# Patient Record
Sex: MALE | Race: WHITE | NOT HISPANIC OR LATINO | URBAN - METROPOLITAN AREA
[De-identification: names, ages, dates, MRNs, and addresses within clinical notes are randomized per-mention and may not be internally consistent; named-entity substitution may affect disease eponyms.]

---

## 2018-10-29 ENCOUNTER — OFFICE (AMBULATORY)
Dept: URBAN - METROPOLITAN AREA PATHOLOGY 4 | Facility: PATHOLOGY | Age: 63
End: 2018-10-29

## 2018-10-29 ENCOUNTER — ON CAMPUS - OUTPATIENT (AMBULATORY)
Dept: URBAN - METROPOLITAN AREA HOSPITAL 2 | Facility: HOSPITAL | Age: 63
End: 2018-10-29
Payer: COMMERCIAL

## 2018-10-29 VITALS
HEIGHT: 74 IN | SYSTOLIC BLOOD PRESSURE: 123 MMHG | DIASTOLIC BLOOD PRESSURE: 74 MMHG | TEMPERATURE: 95.8 F | HEART RATE: 72 BPM | OXYGEN SATURATION: 95 % | WEIGHT: 261 LBS | DIASTOLIC BLOOD PRESSURE: 58 MMHG | SYSTOLIC BLOOD PRESSURE: 114 MMHG | HEART RATE: 59 BPM | HEART RATE: 55 BPM | HEART RATE: 65 BPM | HEART RATE: 80 BPM | RESPIRATION RATE: 18 BRPM | SYSTOLIC BLOOD PRESSURE: 138 MMHG | DIASTOLIC BLOOD PRESSURE: 71 MMHG | OXYGEN SATURATION: 99 % | OXYGEN SATURATION: 96 % | HEART RATE: 77 BPM | HEART RATE: 58 BPM | RESPIRATION RATE: 14 BRPM | SYSTOLIC BLOOD PRESSURE: 104 MMHG | DIASTOLIC BLOOD PRESSURE: 90 MMHG | DIASTOLIC BLOOD PRESSURE: 75 MMHG | OXYGEN SATURATION: 97 % | DIASTOLIC BLOOD PRESSURE: 66 MMHG | HEART RATE: 76 BPM | DIASTOLIC BLOOD PRESSURE: 88 MMHG | SYSTOLIC BLOOD PRESSURE: 126 MMHG | SYSTOLIC BLOOD PRESSURE: 107 MMHG | SYSTOLIC BLOOD PRESSURE: 149 MMHG | OXYGEN SATURATION: 94 % | SYSTOLIC BLOOD PRESSURE: 112 MMHG | DIASTOLIC BLOOD PRESSURE: 65 MMHG | RESPIRATION RATE: 16 BRPM

## 2018-10-29 DIAGNOSIS — Z86.010 PERSONAL HISTORY OF COLONIC POLYPS: ICD-10-CM

## 2018-10-29 DIAGNOSIS — K63.5 POLYP OF COLON: ICD-10-CM

## 2018-10-29 DIAGNOSIS — K57.30 DIVERTICULOSIS OF LARGE INTESTINE WITHOUT PERFORATION OR ABS: ICD-10-CM

## 2018-10-29 DIAGNOSIS — K64.0 FIRST DEGREE HEMORRHOIDS: ICD-10-CM

## 2018-10-29 PROBLEM — D12.3 BENIGN NEOPLASM OF TRANSVERSE COLON: Status: ACTIVE | Noted: 2018-10-29

## 2018-10-29 LAB
GI HISTOLOGY: A. UNSPECIFIED: (no result)
GI HISTOLOGY: PDF REPORT: (no result)

## 2018-10-29 PROCEDURE — 88305 TISSUE EXAM BY PATHOLOGIST: CPT | Mod: 33 | Performed by: INTERNAL MEDICINE

## 2018-10-29 PROCEDURE — 45385 COLONOSCOPY W/LESION REMOVAL: CPT | Mod: 33 | Performed by: INTERNAL MEDICINE

## 2021-10-01 ENCOUNTER — OFFICE (AMBULATORY)
Dept: URBAN - METROPOLITAN AREA CLINIC 64 | Facility: CLINIC | Age: 66
End: 2021-10-01

## 2021-10-01 VITALS
HEART RATE: 88 BPM | DIASTOLIC BLOOD PRESSURE: 54 MMHG | WEIGHT: 280 LBS | HEIGHT: 74 IN | SYSTOLIC BLOOD PRESSURE: 132 MMHG

## 2021-10-01 DIAGNOSIS — R19.7 DIARRHEA, UNSPECIFIED: ICD-10-CM

## 2021-10-01 DIAGNOSIS — R14.0 ABDOMINAL DISTENSION (GASEOUS): ICD-10-CM

## 2021-10-01 DIAGNOSIS — K92.1 MELENA: ICD-10-CM

## 2021-10-01 DIAGNOSIS — R10.84 GENERALIZED ABDOMINAL PAIN: ICD-10-CM

## 2021-10-01 PROCEDURE — 99203 OFFICE O/P NEW LOW 30 MIN: CPT | Performed by: NURSE PRACTITIONER

## 2022-01-11 ENCOUNTER — OFFICE VISIT (OUTPATIENT)
Dept: GASTROENTEROLOGY | Facility: CLINIC | Age: 67
End: 2022-01-11

## 2022-01-11 VITALS — TEMPERATURE: 96.8 F | WEIGHT: 270 LBS

## 2022-01-11 DIAGNOSIS — K75.81 NASH (NONALCOHOLIC STEATOHEPATITIS): ICD-10-CM

## 2022-01-11 DIAGNOSIS — K74.69 OTHER CIRRHOSIS OF LIVER: Primary | ICD-10-CM

## 2022-01-11 PROCEDURE — 99204 OFFICE O/P NEW MOD 45 MIN: CPT | Performed by: INTERNAL MEDICINE

## 2022-01-11 RX ORDER — CYANOCOBALAMIN (VITAMIN B-12) 500 MCG
800 LOZENGE ORAL DAILY
Qty: 60 EACH | Refills: 11 | Status: SHIPPED | OUTPATIENT
Start: 2022-01-11 | End: 2022-12-22

## 2022-01-11 RX ORDER — HYDROCODONE BITARTRATE AND ACETAMINOPHEN 7.5; 325 MG/1; MG/1
TABLET ORAL AS NEEDED
COMMUNITY
Start: 2021-10-20 | End: 2022-02-25

## 2022-01-11 RX ORDER — METFORMIN HYDROCHLORIDE 500 MG/1
500 TABLET, EXTENDED RELEASE ORAL EVERY EVENING
COMMUNITY
Start: 2021-11-05

## 2022-01-11 RX ORDER — TRAMADOL HYDROCHLORIDE 50 MG/1
TABLET ORAL
COMMUNITY
Start: 2022-01-06 | End: 2022-02-25

## 2022-01-11 RX ORDER — HYDROCODONE BITARTRATE AND ACETAMINOPHEN 5; 325 MG/1; MG/1
TABLET ORAL AS NEEDED
COMMUNITY
Start: 2021-10-18 | End: 2022-02-25

## 2022-02-22 ENCOUNTER — TRANSCRIBE ORDERS (OUTPATIENT)
Dept: ADMINISTRATIVE | Facility: HOSPITAL | Age: 67
End: 2022-02-22

## 2022-02-22 DIAGNOSIS — Z01.818 OTHER SPECIFIED PRE-OPERATIVE EXAMINATION: Primary | ICD-10-CM

## 2022-02-25 ENCOUNTER — LAB (OUTPATIENT)
Dept: LAB | Facility: HOSPITAL | Age: 67
End: 2022-02-25

## 2022-02-25 DIAGNOSIS — Z01.818 OTHER SPECIFIED PRE-OPERATIVE EXAMINATION: ICD-10-CM

## 2022-02-25 PROCEDURE — U0004 COV-19 TEST NON-CDC HGH THRU: HCPCS

## 2022-02-25 PROCEDURE — C9803 HOPD COVID-19 SPEC COLLECT: HCPCS

## 2022-02-26 LAB — SARS-COV-2 ORF1AB RESP QL NAA+PROBE: NOT DETECTED

## 2022-02-28 ENCOUNTER — ANESTHESIA EVENT (OUTPATIENT)
Dept: GASTROENTEROLOGY | Facility: HOSPITAL | Age: 67
End: 2022-02-28

## 2022-02-28 ENCOUNTER — HOSPITAL ENCOUNTER (OUTPATIENT)
Facility: HOSPITAL | Age: 67
Setting detail: HOSPITAL OUTPATIENT SURGERY
Discharge: HOME | End: 2022-02-28
Attending: INTERNAL MEDICINE | Admitting: INTERNAL MEDICINE

## 2022-02-28 ENCOUNTER — ANESTHESIA (OUTPATIENT)
Dept: GASTROENTEROLOGY | Facility: HOSPITAL | Age: 67
End: 2022-02-28

## 2022-02-28 VITALS
HEIGHT: 74 IN | DIASTOLIC BLOOD PRESSURE: 61 MMHG | BODY MASS INDEX: 34.39 KG/M2 | WEIGHT: 268 LBS | HEART RATE: 75 BPM | OXYGEN SATURATION: 100 % | RESPIRATION RATE: 16 BRPM | SYSTOLIC BLOOD PRESSURE: 111 MMHG | TEMPERATURE: 98.1 F

## 2022-02-28 DIAGNOSIS — K75.81 NASH (NONALCOHOLIC STEATOHEPATITIS): ICD-10-CM

## 2022-02-28 DIAGNOSIS — K74.69 OTHER CIRRHOSIS OF LIVER: ICD-10-CM

## 2022-02-28 LAB
GLUCOSE BLDC GLUCOMTR-MCNC: 59 MG/DL (ref 70–130)
GLUCOSE BLDC GLUCOMTR-MCNC: 63 MG/DL (ref 70–130)
GLUCOSE BLDC GLUCOMTR-MCNC: 72 MG/DL (ref 70–130)

## 2022-02-28 PROCEDURE — 82962 GLUCOSE BLOOD TEST: CPT

## 2022-02-28 PROCEDURE — S0260 H&P FOR SURGERY: HCPCS | Performed by: INTERNAL MEDICINE

## 2022-02-28 PROCEDURE — 25010000002 PROPOFOL 10 MG/ML EMULSION: Performed by: ANESTHESIOLOGY

## 2022-02-28 PROCEDURE — 43235 EGD DIAGNOSTIC BRUSH WASH: CPT | Performed by: INTERNAL MEDICINE

## 2022-02-28 RX ORDER — SODIUM CHLORIDE 0.9 % (FLUSH) 0.9 %
10 SYRINGE (ML) INJECTION AS NEEDED
Status: DISCONTINUED | OUTPATIENT
Start: 2022-02-28 | End: 2022-02-28 | Stop reason: HOSPADM

## 2022-02-28 RX ORDER — PROPOFOL 10 MG/ML
VIAL (ML) INTRAVENOUS CONTINUOUS PRN
Status: DISCONTINUED | OUTPATIENT
Start: 2022-02-28 | End: 2022-02-28 | Stop reason: SURG

## 2022-02-28 RX ORDER — DEXTROSE MONOHYDRATE 25 G/50ML
25 INJECTION, SOLUTION INTRAVENOUS
Status: DISCONTINUED | OUTPATIENT
Start: 2022-02-28 | End: 2022-02-28 | Stop reason: HOSPADM

## 2022-02-28 RX ORDER — SODIUM CHLORIDE 0.9 % (FLUSH) 0.9 %
3 SYRINGE (ML) INJECTION EVERY 12 HOURS SCHEDULED
Status: DISCONTINUED | OUTPATIENT
Start: 2022-02-28 | End: 2022-02-28 | Stop reason: HOSPADM

## 2022-02-28 RX ORDER — GLYCOPYRROLATE 0.2 MG/ML
INJECTION INTRAMUSCULAR; INTRAVENOUS AS NEEDED
Status: DISCONTINUED | OUTPATIENT
Start: 2022-02-28 | End: 2022-02-28 | Stop reason: SURG

## 2022-02-28 RX ORDER — LIDOCAINE HYDROCHLORIDE 20 MG/ML
INJECTION, SOLUTION INFILTRATION; PERINEURAL AS NEEDED
Status: DISCONTINUED | OUTPATIENT
Start: 2022-02-28 | End: 2022-02-28 | Stop reason: SURG

## 2022-02-28 RX ORDER — SODIUM CHLORIDE, SODIUM LACTATE, POTASSIUM CHLORIDE, CALCIUM CHLORIDE 600; 310; 30; 20 MG/100ML; MG/100ML; MG/100ML; MG/100ML
30 INJECTION, SOLUTION INTRAVENOUS CONTINUOUS PRN
Status: DISCONTINUED | OUTPATIENT
Start: 2022-02-28 | End: 2022-02-28 | Stop reason: HOSPADM

## 2022-02-28 RX ORDER — PROPOFOL 10 MG/ML
VIAL (ML) INTRAVENOUS AS NEEDED
Status: DISCONTINUED | OUTPATIENT
Start: 2022-02-28 | End: 2022-02-28 | Stop reason: SURG

## 2022-02-28 RX ADMIN — LIDOCAINE HYDROCHLORIDE 60 MG: 20 INJECTION, SOLUTION INFILTRATION; PERINEURAL at 15:15

## 2022-02-28 RX ADMIN — Medication 200 MCG/KG/MIN: at 15:15

## 2022-02-28 RX ADMIN — PROPOFOL 100 MG: 10 INJECTION, EMULSION INTRAVENOUS at 15:15

## 2022-02-28 RX ADMIN — DEXTROSE MONOHYDRATE 25 ML: 25 INJECTION, SOLUTION INTRAVENOUS at 14:53

## 2022-02-28 RX ADMIN — GLYCOPYRROLATE 0.2 MG: 0.2 INJECTION INTRAMUSCULAR; INTRAVENOUS at 15:12

## 2022-02-28 RX ADMIN — SODIUM CHLORIDE, POTASSIUM CHLORIDE, SODIUM LACTATE AND CALCIUM CHLORIDE 30 ML/HR: 600; 310; 30; 20 INJECTION, SOLUTION INTRAVENOUS at 14:41

## 2022-02-28 NOTE — ANESTHESIA POSTPROCEDURE EVALUATION
"Patient: Lei Mercado    Procedure Summary     Date: 02/28/22 Room / Location: [facility] ENDOSCOPY 6 /  [facility]    Anesthesia Start: 1506 Anesthesia Stop: 1528    Procedure: ESOPHAGOGASTRODUODENOSCOPY (N/A Esophagus) Diagnosis:       Other cirrhosis of liver (HCC)      PERALES (nonalcoholic steatohepatitis)      (Other cirrhosis of liver (HCC) [K74.69])      (PERALES (nonalcoholic steatohepatitis) [K75.81])    Surgeons: David Almeida MD Provider: Saji Nguyễn MD    Anesthesia Type: MAC ASA Status: 3          Anesthesia Type: MAC    Vitals  Vitals Value Taken Time   /61 02/28/22 1545   Temp     Pulse 75 02/28/22 1545   Resp 16 02/28/22 1545   SpO2 100 % 02/28/22 1545           Post Anesthesia Care and Evaluation    Patient location during evaluation: bedside  Patient participation: complete - patient participated  Level of consciousness: awake and alert  Pain management: adequate  Airway patency: patent  Anesthetic complications: No anesthetic complications  PONV Status: none  Cardiovascular status: acceptable  Respiratory status: acceptable  Hydration status: acceptable    Comments: /61 (BP Location: Left arm, Patient Position: Sitting)   Pulse 75   Temp 36.7 °C (98.1 °F) (Oral)   Resp 16   Ht 188 cm (74\")   Wt 122 kg (268 lb)   SpO2 100%   BMI 34.41 kg/m²         "

## 2022-02-28 NOTE — PROGRESS NOTES
Chief Complaint   Patient presents with   • Cirrhosis     Lei Mercado is a 66 y.o. male who presents with a history of fatty liver and cirrhosis  HPI     Patient 66-year-old male with history hypertension, diabetes found with changes of cirrhosis and renal cell carcinoma.  Patient currently suffering with intermittent right upper quadrant pain last EGD in 2018 was unremarkable per patient but results unavailable at this time.  Patient referred for evaluation for cirrhosis.  Patient denies bleeding diathesis but does note some abdominal distention but baseline obesity is present.  Patient denies any fever chills, no notable jaundice or change in urine or stool color.    Past Medical History:   Diagnosis Date   • Anesthesia complication     AWAKENED EARLY, ASPIRATION PNEUMONIA   • Cancer (HCC)     KIDNEY   • Cirrhosis (HCC)    • Diabetes mellitus (HCC)    • Diverticulitis of colon    • Fatty liver    • Gallstones    • Hypertension    • Liver disease      No current facility-administered medications for this visit.  No current outpatient medications on file.    Facility-Administered Medications Ordered in Other Visits:   •  dextrose (D50W) (25 g/50 mL) IV injection 25 mL, 25 mL, Intravenous, Q1H PRN, Abhi Decker MD, 25 mL at 02/28/22 1453  •  glycopyrrolate (ROBINUL) injection, , Intravenous, PRN, Saji Nguyễn MD, 0.2 mg at 02/28/22 1512  •  lactated ringers infusion, 30 mL/hr, Intravenous, Continuous PRN, David Almeida MD, Last Rate: 30 mL/hr at 02/28/22 1506, Currently Infusing at 02/28/22 1506  •  sodium chloride 0.9 % flush 10 mL, 10 mL, Intravenous, PRN, David Almeida MD  •  sodium chloride 0.9 % flush 3 mL, 3 mL, Intravenous, Q12H, David Almeida MD  No Known Allergies  Social History     Socioeconomic History   • Marital status: Single   Tobacco Use   • Smoking status: Former Smoker   • Smokeless tobacco: Never Used   • Tobacco comment: quit 20 plus years ago    Substance and  Sexual Activity   • Alcohol use: Never   • Drug use: Never   • Sexual activity: Defer     Family History   Problem Relation Age of Onset   • Cirrhosis Mother    • Liver disease Sister    • Malig Hyperthermia Neg Hx      Review of Systems   Constitutional: Positive for fatigue. Negative for activity change, appetite change, chills, diaphoresis, fever and unexpected weight change.   HENT: Negative.    Eyes: Negative.    Respiratory: Negative.    Cardiovascular: Negative.    Gastrointestinal: Positive for abdominal distention and abdominal pain. Negative for anal bleeding, blood in stool, constipation, diarrhea, nausea, rectal pain and vomiting.   Endocrine: Negative.    Musculoskeletal: Negative.    Skin: Negative.    Allergic/Immunologic: Negative.    Hematological: Negative.      Vitals:    01/11/22 1619   Temp: 96.8 °F (36 °C)     Physical Exam  Vitals and nursing note reviewed.   Constitutional:       Appearance: Normal appearance. He is well-developed. He is obese.   HENT:      Head: Normocephalic and atraumatic.   Eyes:      General: No scleral icterus.     Conjunctiva/sclera: Conjunctivae normal.   Neck:      Trachea: No tracheal deviation.   Cardiovascular:      Rate and Rhythm: Normal rate and regular rhythm.   Pulmonary:      Effort: Pulmonary effort is normal. No respiratory distress.      Breath sounds: Normal breath sounds.   Abdominal:      General: Bowel sounds are normal. There is distension.      Palpations: Abdomen is soft. There is no mass.      Tenderness: There is no abdominal tenderness. There is no guarding or rebound.   Musculoskeletal:         General: No swelling or tenderness. Normal range of motion.      Cervical back: Normal range of motion and neck supple. No rigidity.   Skin:     General: Skin is warm and dry.      Coloration: Skin is not jaundiced.      Findings: No rash.   Neurological:      General: No focal deficit present.      Mental Status: He is alert and oriented to person,  place, and time.      Cranial Nerves: No cranial nerve deficit.   Psychiatric:         Behavior: Behavior normal.         Thought Content: Thought content normal.         Judgment: Judgment normal.       Diagnoses and all orders for this visit:    Other cirrhosis of liver (HCC)  -     Case Request; Standing  -     Case Request    PERALES (nonalcoholic steatohepatitis)  -     Case Request; Standing  -     Case Request    Other orders  -     Discontinue: traMADol (ULTRAM) 50 MG tablet; TAKE 1 TABLET BY MOUTH EVERY 8 HOURS AS NEEDED FOR PAIN . DO NOT EXCEED 3 PER 24 HOURS  -     metFORMIN ER (GLUCOPHAGE-XR) 500 MG 24 hr tablet; Take 2,000 mg by mouth Daily.  -     Discontinue: HYDROcodone-acetaminophen (NORCO) 7.5-325 MG per tablet; As Needed.  -     Discontinue: HYDROcodone-acetaminophen (NORCO) 5-325 MG per tablet; As Needed.  -     Vitamin E 400 units tablet; Take 800 Units by mouth Daily.    Patient 66-year-old male with history of diabetes, hypertension, fatty liver with signs of cirrhosis as well as a CT consistent with kidney mass.  Patient complaining of right upper quadrant pain here for evaluation of his liver disease.  At this point to diffuse likely Perales would recommend patient begin vitamin E 800 units daily.  Recommend follow-up with primary care to assess his kidney tumor for possible renal cell as this would also cause intermittent right upper quadrant pain.  Patient for EGD to evaluate cause of the right upper quadrant from GI perspective and evaluate for varices.  We will follow-up clinically based on endoscopy findings

## 2022-02-28 NOTE — ANESTHESIA PREPROCEDURE EVALUATION
Anesthesia Evaluation     Patient summary reviewed and Nursing notes reviewed   no history of anesthetic complications:  NPO Solid Status: > 8 hours  NPO Liquid Status: > 4 hours           Airway   Mallampati: II  Dental      Pulmonary - normal exam   (+) a smoker Former,   Cardiovascular - normal exam    (+) hypertension less than 2 medications,       Neuro/Psych- negative ROS  GI/Hepatic/Renal/Endo    (+)   liver disease fatty liver disease cirrhosis, diabetes mellitus type 2,     Musculoskeletal     Abdominal    Substance History      OB/GYN          Other      history of cancer                    Anesthesia Plan    ASA 3     MAC     intravenous induction     Anesthetic plan, all risks, benefits, and alternatives have been provided, discussed and informed consent has been obtained with: patient.        CODE STATUS:

## 2022-05-09 ENCOUNTER — OFFICE VISIT (OUTPATIENT)
Dept: GASTROENTEROLOGY | Facility: CLINIC | Age: 67
End: 2022-05-09

## 2022-05-09 VITALS
HEART RATE: 87 BPM | TEMPERATURE: 96.1 F | WEIGHT: 284.6 LBS | BODY MASS INDEX: 36.52 KG/M2 | SYSTOLIC BLOOD PRESSURE: 137 MMHG | HEIGHT: 74 IN | DIASTOLIC BLOOD PRESSURE: 75 MMHG

## 2022-05-09 DIAGNOSIS — R18.8 CIRRHOSIS OF LIVER WITH ASCITES, UNSPECIFIED HEPATIC CIRRHOSIS TYPE: Primary | ICD-10-CM

## 2022-05-09 DIAGNOSIS — K74.60 CIRRHOSIS OF LIVER WITH ASCITES, UNSPECIFIED HEPATIC CIRRHOSIS TYPE: Primary | ICD-10-CM

## 2022-05-09 DIAGNOSIS — K75.81 NASH (NONALCOHOLIC STEATOHEPATITIS): ICD-10-CM

## 2022-05-09 PROCEDURE — 99214 OFFICE O/P EST MOD 30 MIN: CPT | Performed by: NURSE PRACTITIONER

## 2022-05-09 RX ORDER — TERAZOSIN 10 MG/1
10 CAPSULE ORAL NIGHTLY
COMMUNITY
Start: 2022-03-07

## 2022-05-09 RX ORDER — ALBUMIN (HUMAN) 12.5 G/50ML
12.5 SOLUTION INTRAVENOUS ONCE
Status: CANCELLED | OUTPATIENT
Start: 2022-05-09 | End: 2022-05-09

## 2022-05-09 RX ORDER — PIOGLITAZONEHYDROCHLORIDE 15 MG/1
15 TABLET ORAL EVERY EVENING
COMMUNITY
Start: 2022-03-08

## 2022-05-09 RX ORDER — SERTRALINE HYDROCHLORIDE 100 MG/1
200 TABLET, FILM COATED ORAL EVERY EVENING
COMMUNITY
Start: 2022-03-08

## 2022-05-09 RX ORDER — MONTELUKAST SODIUM 10 MG/1
10 TABLET ORAL
COMMUNITY
Start: 2022-05-03

## 2022-05-09 RX ORDER — LANOLIN ALCOHOL/MO/W.PET/CERES
325 CREAM (GRAM) TOPICAL 3 TIMES DAILY
COMMUNITY
Start: 2021-06-15 | End: 2022-06-15

## 2022-05-09 RX ORDER — AMLODIPINE BESYLATE AND BENAZEPRIL HYDROCHLORIDE 10; 40 MG/1; MG/1
CAPSULE ORAL
COMMUNITY
Start: 2022-03-07 | End: 2022-12-22

## 2022-05-09 RX ORDER — ESOMEPRAZOLE MAGNESIUM 40 MG/1
40 CAPSULE, DELAYED RELEASE ORAL EVERY EVENING
COMMUNITY
Start: 2022-03-07 | End: 2022-12-29 | Stop reason: HOSPADM

## 2022-05-09 RX ORDER — ROSUVASTATIN CALCIUM 10 MG/1
10 TABLET, COATED ORAL NIGHTLY
COMMUNITY
Start: 2022-03-08

## 2022-05-09 RX ORDER — ALBUTEROL SULFATE 90 UG/1
AEROSOL, METERED RESPIRATORY (INHALATION)
COMMUNITY
Start: 2022-04-11 | End: 2022-12-22

## 2022-05-09 RX ORDER — ALBUTEROL SULFATE 2.5 MG/3ML
SOLUTION RESPIRATORY (INHALATION)
COMMUNITY
Start: 2022-05-06 | End: 2022-12-22

## 2022-05-09 NOTE — PROGRESS NOTES
Chief Complaint   Patient presents with   • Ascites       HPI    Lei Mercado is a  66 y.o. male here with a history of fatty liver disease and cirrhosis with new onset ascites.    Patient follows with Dr. Almeida, new to me.    History of hypertension, diabetes and renal cell carcinoma.    Patient seen by Dr. Almeida for initial consultation in January at which time he complained of intermittent right upper quadrant abdominal pain.  He was also referred for evaluation for cirrhosis confirmed on CT.  He was started on vitamin E 800 units daily and instructed to go back to his primary care provider to assess his kidneys tumor for possible renal cell carcinoma.    Subsequently underwent EGD to evaluate because of right upper quadrant pain and evaluate for esophageal varices.  Reviewed EGD dated 2/28/2022 --small hiatal hernia otherwise normal.  No specimens collected.    On visit today patient reports approximately 20 pound weight gain since his last office visit with Dr. Almeida.  Weight accumulating in the abdominal region with distention.  Patient denies history of ascites.  He is never had a paracentesis.  No bilateral lower extremity edema.  Reports abdominal discomfort secondary to distention but denies right upper quadrant abdominal pain.  No jaundice, icterus, poor appetite, nausea, or vomiting.  He continues on vitamin E as previously directed.  He is not on diuretics.    He is on metformin and iron supplementation.  He has had diarrhea after starting metformin.  No rectal bleeding.  His last colonoscopy was 2018.  Patient reports personal history of colon polyps.    Hemoglobin 2 months ago 9.7.    Past Medical History:   Diagnosis Date   • Anemia 09/2011   • Anesthesia complication     AWAKENED EARLY, ASPIRATION PNEUMONIA   • Cancer (HCC)     KIDNEY   • Cirrhosis (HCC)    • Diabetes mellitus (HCC)    • Diverticulitis of colon    • Esophageal varices (HCC)    • Fatty liver    • Gallstones    • Hypertension    •  "Liver disease        Past Surgical History:   Procedure Laterality Date   • COLONOSCOPY  approx 2018    benign polyps per pt    • ENDOSCOPY N/A 2022    Procedure: ESOPHAGOGASTRODUODENOSCOPY;  Surgeon: David Almeida MD;  Location: [facility] ENDOSCOPY;  Service: Gastroenterology;  Laterality: N/A;  pre - ruq pain, hx parikh, cirrhosis  post - hiatal hernia   • NEPHRECTOMY Right     partial    • SHOULDER SURGERY      right shoulder \"reverse\" per pt    • UPPER GASTROINTESTINAL ENDOSCOPY  approx 2018    negative per pt        Scheduled Meds:     Continuous Infusions: No current facility-administered medications for this visit.      PRN Meds:     No Known Allergies    Social History     Socioeconomic History   • Marital status: Single   Tobacco Use   • Smoking status: Former Smoker     Packs/day: 1.00     Years: 15.00     Pack years: 15.00     Types: Cigarettes     Start date: 1971     Quit date: 1984     Years since quittin.0   • Smokeless tobacco: Never Used   • Tobacco comment: quit 20 plus years ago    Substance and Sexual Activity   • Alcohol use: Never   • Drug use: Not Currently     Types: Marijuana   • Sexual activity: Defer       Family History   Problem Relation Age of Onset   • Cirrhosis Mother    • Liver disease Sister    • Malig Hyperthermia Neg Hx        Review of Systems   Constitutional: Negative for activity change, appetite change, fatigue, fever and unexpected weight change.   HENT: Negative for trouble swallowing.    Respiratory: Negative for apnea, cough, choking, chest tightness, shortness of breath and wheezing.    Cardiovascular: Negative for chest pain, palpitations and leg swelling.   Gastrointestinal: Positive for abdominal distention. Negative for abdominal pain, anal bleeding, blood in stool, constipation, diarrhea, nausea, rectal pain and vomiting.       Vitals:    22 0955   BP: 137/75   Pulse: 87   Temp: 96.1 °F (35.6 °C)       Physical Exam  Constitutional:       " Appearance: He is well-developed.   Abdominal:      General: Bowel sounds are normal. There is no distension.      Palpations: Abdomen is rigid. There is no mass.      Tenderness: There is no abdominal tenderness. There is no guarding.      Hernia: No hernia is present.      Comments: + Ascites   Skin:     General: Skin is warm and dry.      Capillary Refill: Capillary refill takes less than 2 seconds.   Neurological:      Mental Status: He is alert and oriented to person, place, and time.   Psychiatric:         Behavior: Behavior normal.     Assessment    Diagnoses and all orders for this visit:    1. Cirrhosis of liver with ascites, unspecified hepatic cirrhosis type (HCC) (Primary)  -     CBC & Differential  -     Comprehensive Metabolic Panel  -     Protime-INR  -     AFP Tumor Marker  -     US Paracentesis; Future  -     Duplex Portal Hepatic Complete CAR; Future    2. PERALES (nonalcoholic steatohepatitis)  Overview:  Added automatically from request for surgery [id number]    Orders:  -     CBC & Differential  -     Comprehensive Metabolic Panel  -     Protime-INR  -     AFP Tumor Marker       Plan    Arrange ultrasound-guided paracentesis with fluid analysis as above  Obtain hepatic Doppler flow studies  Labs today as above  Continue vitamin E   Begin low-salt diet -- 2 g/day  Educational handout given to the patient with low-salt diet instructions  After paracentesis patient is to weigh himself every morning and keep a log --we discussed when to call our office based on weight log/weight gain  Consider diuretic initiation pending lab work and paracentesis results  Keep previously scheduled follow-up with Dr. Almeida next month         DONALD Bassett  [facility]  [address] [place] [address]  Office: [phone]

## 2022-05-10 ENCOUNTER — TELEPHONE (OUTPATIENT)
Dept: GASTROENTEROLOGY | Facility: CLINIC | Age: 67
End: 2022-05-10

## 2022-05-10 LAB
AFP-TM SERPL-MCNC: 1.6 NG/ML (ref 0–8.4)
ALBUMIN SERPL-MCNC: 3.8 G/DL (ref 3.8–4.8)
ALBUMIN/GLOB SERPL: 1.2 {RATIO} (ref 1.2–2.2)
ALP SERPL-CCNC: 131 IU/L (ref 44–121)
ALT SERPL-CCNC: 9 IU/L (ref 0–44)
AST SERPL-CCNC: 15 IU/L (ref 0–40)
BASOPHILS # BLD AUTO: 0 X10E3/UL (ref 0–0.2)
BASOPHILS NFR BLD AUTO: 0 %
BILIRUB SERPL-MCNC: 0.4 MG/DL (ref 0–1.2)
BUN SERPL-MCNC: 24 MG/DL (ref 8–27)
BUN/CREAT SERPL: 18 (ref 10–24)
CALCIUM SERPL-MCNC: 9.3 MG/DL (ref 8.6–10.2)
CHLORIDE SERPL-SCNC: 103 MMOL/L (ref 96–106)
CO2 SERPL-SCNC: 23 MMOL/L (ref 20–29)
CREAT SERPL-MCNC: 1.33 MG/DL (ref 0.76–1.27)
EGFRCR SERPLBLD CKD-EPI 2021: 59 ML/MIN/1.73
EOSINOPHIL # BLD AUTO: 0 X10E3/UL (ref 0–0.4)
EOSINOPHIL NFR BLD AUTO: 1 %
ERYTHROCYTE [DISTWIDTH] IN BLOOD BY AUTOMATED COUNT: 15.8 % (ref 11.6–15.4)
GLOBULIN SER CALC-MCNC: 3.3 G/DL (ref 1.5–4.5)
GLUCOSE SERPL-MCNC: 206 MG/DL (ref 65–99)
HCT VFR BLD AUTO: 32 % (ref 37.5–51)
HGB BLD-MCNC: 9.6 G/DL (ref 13–17.7)
IMM GRANULOCYTES # BLD AUTO: 0 X10E3/UL (ref 0–0.1)
IMM GRANULOCYTES NFR BLD AUTO: 0 %
INR PPP: 1.1 (ref 0.9–1.2)
LYMPHOCYTES # BLD AUTO: 0.5 X10E3/UL (ref 0.7–3.1)
LYMPHOCYTES NFR BLD AUTO: 21 %
MCH RBC QN AUTO: 24.1 PG (ref 26.6–33)
MCHC RBC AUTO-ENTMCNC: 30 G/DL (ref 31.5–35.7)
MCV RBC AUTO: 80 FL (ref 79–97)
MONOCYTES # BLD AUTO: 0.2 X10E3/UL (ref 0.1–0.9)
MONOCYTES NFR BLD AUTO: 8 %
NEUTROPHILS # BLD AUTO: 1.8 X10E3/UL (ref 1.4–7)
NEUTROPHILS NFR BLD AUTO: 70 %
PLATELET # BLD AUTO: 105 X10E3/UL (ref 150–450)
POTASSIUM SERPL-SCNC: 4.5 MMOL/L (ref 3.5–5.2)
PROT SERPL-MCNC: 7.1 G/DL (ref 6–8.5)
PROTHROMBIN TIME: 11.6 SEC (ref 9.1–12)
RBC # BLD AUTO: 3.99 X10E6/UL (ref 4.14–5.8)
SODIUM SERPL-SCNC: 140 MMOL/L (ref 134–144)
WBC # BLD AUTO: 2.5 X10E3/UL (ref 3.4–10.8)

## 2022-05-10 RX ORDER — ALBUMIN (HUMAN) 12.5 G/50ML
25 SOLUTION INTRAVENOUS DAILY PRN
Status: CANCELLED | OUTPATIENT
Start: 2022-05-11

## 2022-05-10 RX ORDER — ALBUMIN (HUMAN) 12.5 G/50ML
12.5 SOLUTION INTRAVENOUS DAILY PRN
Status: CANCELLED | OUTPATIENT
Start: 2022-05-11

## 2022-05-10 NOTE — TELEPHONE ENCOUNTER
----- Message from DONALD Bassett sent at 5/10/2022 12:37 PM EDT -----  Please inform Lei that his lab work shows stable anemia and improvement in liver function test.  Calculated meld score 10.  Await paracentesis.

## 2022-05-10 NOTE — PROGRESS NOTES
Please inform Lei that his lab work shows stable anemia and improvement in liver function test.  Calculated meld score 10.  Await paracentesis.

## 2022-05-11 ENCOUNTER — HOSPITAL ENCOUNTER (OUTPATIENT)
Dept: INFUSION THERAPY | Facility: HOSPITAL | Age: 67
Discharge: HOME | End: 2022-05-11
Admitting: NURSE PRACTITIONER

## 2022-05-11 VITALS
RESPIRATION RATE: 15 BRPM | SYSTOLIC BLOOD PRESSURE: 153 MMHG | DIASTOLIC BLOOD PRESSURE: 57 MMHG | OXYGEN SATURATION: 97 % | HEART RATE: 78 BPM

## 2022-05-11 DIAGNOSIS — R18.8 CIRRHOSIS OF LIVER WITH ASCITES, UNSPECIFIED HEPATIC CIRRHOSIS TYPE: ICD-10-CM

## 2022-05-11 DIAGNOSIS — K74.60 CIRRHOSIS OF LIVER WITH ASCITES, UNSPECIFIED HEPATIC CIRRHOSIS TYPE: ICD-10-CM

## 2022-05-11 LAB
ALBUMIN FLD-MCNC: 2.3 G/DL
AMYLASE FLD-CCNC: 19 U/L
APPEARANCE FLD: CLEAR
COLOR FLD: YELLOW
GLUCOSE FLD-MCNC: 186 MG/DL
LDH FLD-CCNC: 75 U/L
LYMPHOCYTES NFR FLD MANUAL: 70 %
METHOD: NORMAL
MONOCYTES NFR FLD: 26 %
NEUTROPHILS NFR FLD MANUAL: 4 %
NUC CELL # FLD: 359 /MM3
PROT FLD-MCNC: 3.5 G/DL
RBC # FLD AUTO: 941 /MM3

## 2022-05-11 PROCEDURE — 76942 ECHO GUIDE FOR BIOPSY: CPT

## 2022-05-11 PROCEDURE — 87075 CULTR BACTERIA EXCEPT BLOOD: CPT | Performed by: NURSE PRACTITIONER

## 2022-05-11 PROCEDURE — 88108 CYTOPATH CONCENTRATE TECH: CPT | Performed by: NURSE PRACTITIONER

## 2022-05-11 PROCEDURE — 49083 ABD PARACENTESIS W/IMAGING: CPT | Performed by: FAMILY MEDICINE

## 2022-05-11 PROCEDURE — 82945 GLUCOSE OTHER FLUID: CPT | Performed by: NURSE PRACTITIONER

## 2022-05-11 PROCEDURE — 87070 CULTURE OTHR SPECIMN AEROBIC: CPT | Performed by: NURSE PRACTITIONER

## 2022-05-11 PROCEDURE — 25010000002 ALBUMIN HUMAN 25% PER 50 ML: Performed by: NURSE PRACTITIONER

## 2022-05-11 PROCEDURE — 89051 BODY FLUID CELL COUNT: CPT | Performed by: NURSE PRACTITIONER

## 2022-05-11 PROCEDURE — 84157 ASSAY OF PROTEIN OTHER: CPT | Performed by: NURSE PRACTITIONER

## 2022-05-11 PROCEDURE — 82042 OTHER SOURCE ALBUMIN QUAN EA: CPT | Performed by: NURSE PRACTITIONER

## 2022-05-11 PROCEDURE — 83615 LACTATE (LD) (LDH) ENZYME: CPT | Performed by: NURSE PRACTITIONER

## 2022-05-11 PROCEDURE — 82247 BILIRUBIN TOTAL: CPT | Performed by: NURSE PRACTITIONER

## 2022-05-11 PROCEDURE — 96365 THER/PROPH/DIAG IV INF INIT: CPT

## 2022-05-11 PROCEDURE — P9047 ALBUMIN (HUMAN), 25%, 50ML: HCPCS | Performed by: NURSE PRACTITIONER

## 2022-05-11 PROCEDURE — 82150 ASSAY OF AMYLASE: CPT | Performed by: NURSE PRACTITIONER

## 2022-05-11 PROCEDURE — 87205 SMEAR GRAM STAIN: CPT | Performed by: NURSE PRACTITIONER

## 2022-05-11 PROCEDURE — 96366 THER/PROPH/DIAG IV INF ADDON: CPT

## 2022-05-11 RX ORDER — ALBUMIN (HUMAN) 12.5 G/50ML
12.5 SOLUTION INTRAVENOUS DAILY PRN
Status: DISCONTINUED | OUTPATIENT
Start: 2022-05-11 | End: 2022-05-13 | Stop reason: HOSPADM

## 2022-05-11 RX ORDER — ALBUMIN (HUMAN) 12.5 G/50ML
25 SOLUTION INTRAVENOUS DAILY PRN
Status: DISCONTINUED | OUTPATIENT
Start: 2022-05-11 | End: 2022-05-13 | Stop reason: HOSPADM

## 2022-05-11 RX ORDER — ALBUMIN (HUMAN) 12.5 G/50ML
12.5 SOLUTION INTRAVENOUS ONCE
Status: DISCONTINUED | OUTPATIENT
Start: 2022-05-11 | End: 2022-05-13 | Stop reason: HOSPADM

## 2022-05-11 RX ADMIN — ALBUMIN (HUMAN) 25 G: 0.25 INJECTION, SOLUTION INTRAVENOUS at 13:09

## 2022-05-11 RX ADMIN — ALBUMIN (HUMAN) 25 G: 0.25 INJECTION, SOLUTION INTRAVENOUS at 13:59

## 2022-05-11 RX ADMIN — ALBUMIN (HUMAN) 25 G: 0.25 INJECTION, SOLUTION INTRAVENOUS at 14:30

## 2022-05-11 NOTE — PROCEDURES
Procedure:Ultrasound guided Paracentesis    Consent: Informed    Pre op diagnosis: Cirrhosis, Massive ascites    Post op diagnosis: Cirrhosis, Massive ascites    Anesthesia Provider: .Bimal Hartman MD    Anesthesia type: Local infiltration with 1% xylocaine     Surgeon: .Bimal Hartman MD    Assistant: none    Significant Clinical Findings:    Procedure summary:    Allergies , labs and medications were reviewed. Patient was made to lay supine and the area of interest was imaged with ultrasound and fluid verified and marked.  Area of interest was cleaned and draped in a sterile fashion. subsequently local infiltration with xylocaine. Trocar and canula were advanced. Upon verification of the fluid, trocar was steadied and canula advanced further. Trocar was drawn out. Canula was connected to extension tubing and to the vacuum bottle. Subsequently the canula was removed after completion of the procedure.     Findings: Clear straw yellow fluid was obtained. See nursing documentation for volume drained.    Lab Specimen: 40 ml    Complication: none    EBL: 0 ml    Post op condition: Stable. Albumin was given by protocol if needed.    Post op plan: Discharge back to the referring physician.

## 2022-05-11 NOTE — PROGRESS NOTES
PARACENTESIS    Time Arrived in Department: 1105      Consent: yes  Allergies have been Reviewed: yes      ID Band Verified: yes    Method: Ambulatory    Lab Results: Checked    Physician: Devan      Nurse: Samreen Holm RN    IR Care Plan: Person Educated - Patient, Current Knowledge - Understands, Learning Barrier - None, Readiness to Learn - Acceptance, Teaching Topic - Procedure and Evaluation of Teaching - Verbalized Understanding      Neurological: Within Normal Limits    Skin: Flesh, Warm and Dry    Respiratory Status: Respirations even and unlabored    Room In: 9      Procedure: Paracentesis    Time Out Completed: yes    Time Out: 1225    Prep Time: 1228    Prep Site 1: Right Lateral Abdomen      Prep: Chlorhexidine and Sterile drape    Procedure Position: Right Side    Guidance: Ultrasound    Fluid Quality: Clear and Yellow    Procedure Note: Pt prepped and draped in a sterile fashion. 1% lidocaine with spinal needle used for local anesthesia.  Pt tolerated procedure well.         Intra Time 1:1250    Intra Assess 1:   LOC: Alert  Pain:  No Issues  Skin: Flesh, Warm and Dry  Respiratory Status:  Even and Unlabored  Intra Procedure Note 1: Pt draining well        Intra Time 2: 1310    Intra Assess 2:   LOC: Alert  Pain: No Issues  Skin: Flesh, Warm and Dry  Respiratory Status: Even and Unlabored  Intra Procedure Note 2: continues to drain        Intra Time 3: 1405    Intra Assess 3:   LOC: Alert  Pain: No Issues  Skin: Flesh, Warm and Dry  Respiratory Status: Even and Unlabored  Intra Procedure Note 3:para completed                Post-Procedure Position: Supine and HOB Elevated    Dressing Site 1: 2x2 and Tegaderm    Post Procedure Status:  Alert and Oriented, returned to baseline, Return to baseline, Dressing Dry/ Intact, IV documented (see flowsheet), Stable Condition and Dressing properly labeled    Specimen To Lab: yes    Total Fluid Removed: 12.2 liters    Post Procedure Note:  Pt tolerated procedure  well.  75 Gm Albumin per order.  Pt tolerated procedure well        Report Given To: n/a    Admit/Transfer To: n/a    Transfer Time: n/a    Discharge Time: 1520    Method: Ambulatory    O2 on Transfer: no    Accompanied By:  None    Clothes Changed:  Self    Discharged Location:  Routine to Home    Discharge Teaching:  Care of Dressing, Follow up with MD, Home Care Instructions Sheet Given and PAtient

## 2022-05-13 LAB
LAB AP CASE REPORT: NORMAL
LAB AP CLINICAL INFORMATION: NORMAL
PATH REPORT.FINAL DX SPEC: NORMAL
PATH REPORT.GROSS SPEC: NORMAL

## 2022-05-14 LAB — BILIRUB FLD-MCNC: 0.3 MG/DL

## 2022-05-16 LAB
BACTERIA FLD CULT: NORMAL
BACTERIA SPEC ANAEROBE CULT: NORMAL
GRAM STN SPEC: NORMAL
GRAM STN SPEC: NORMAL

## 2022-06-02 ENCOUNTER — OFFICE VISIT (OUTPATIENT)
Dept: GASTROENTEROLOGY | Facility: CLINIC | Age: 67
End: 2022-06-02

## 2022-06-02 VITALS
DIASTOLIC BLOOD PRESSURE: 81 MMHG | WEIGHT: 276.8 LBS | HEIGHT: 74 IN | SYSTOLIC BLOOD PRESSURE: 156 MMHG | TEMPERATURE: 96.2 F | HEART RATE: 73 BPM | BODY MASS INDEX: 35.52 KG/M2

## 2022-06-02 DIAGNOSIS — R18.8 OTHER ASCITES: ICD-10-CM

## 2022-06-02 DIAGNOSIS — K74.69 OTHER CIRRHOSIS OF LIVER: Primary | ICD-10-CM

## 2022-06-02 DIAGNOSIS — K75.81 NASH (NONALCOHOLIC STEATOHEPATITIS): ICD-10-CM

## 2022-06-02 PROCEDURE — 99213 OFFICE O/P EST LOW 20 MIN: CPT | Performed by: INTERNAL MEDICINE

## 2022-06-02 RX ORDER — SPIRONOLACTONE 100 MG/1
100 TABLET, FILM COATED ORAL DAILY
Qty: 90 TABLET | Refills: 3 | Status: SHIPPED | OUTPATIENT
Start: 2022-06-02 | End: 2022-12-22

## 2022-06-02 RX ORDER — ALBUMIN (HUMAN) 12.5 G/50ML
25 SOLUTION INTRAVENOUS ONCE
Status: CANCELLED | OUTPATIENT
Start: 2022-06-02 | End: 2022-06-02

## 2022-06-02 NOTE — PROGRESS NOTES
Chief Complaint   Patient presents with   • Cirrhosis   • Ascites       Lei Mercado is a  66 y.o. male here for a follow up visit for cirrhosis with ascites.    HPI     Patient 66-year-old male with history of diabetes, hyperlipidemia hypertension here for follow-up of his [place] related cirrhosis.  Patient complaining of increasing shortness of breath not nearly as bad as it was prior to last paracentesis.  Patient denies any fever chills no change in urine or stool color.    Past Medical History:   Diagnosis Date   • Anemia 09/2011   • Anesthesia complication     AWAKENED EARLY, ASPIRATION PNEUMONIA   • Cancer (HCC)     KIDNEY   • Cirrhosis (HCC)    • Colon polyp    • Diabetes mellitus (HCC)    • Diverticulitis of colon    • Esophageal varices (HCC)    • Fatty liver    • Gallstones    • Hyperlipidemia    • Hypertension    • Liver disease          Current Outpatient Medications:   •  albuterol (PROVENTIL) (2.5 MG/3ML) 0.083% nebulizer solution, USE 1 VIAL IN NEBULIZER EVERY 4 TO 6 HOURS AS NEEDED FOR COPD/ASTHMA, Disp: , Rfl:   •  albuterol sulfate  (90 Base) MCG/ACT inhaler, INHALE 2 PUFFS BY MOUTH EVERY 6 HOURS AS NEEDED FOR WHEEZING FOR SHORTNESS OF BREATH, Disp: , Rfl:   •  amLODIPine-benazepril (LOTREL) 10-40 MG per capsule,  Maintenance, 04/09/22 11:44:00 EDT, Disp: , Rfl:   •  cyanocobalamin (VITAMIN B-12) 1000 MCG tablet, Take 1,000 mcg by mouth Daily., Disp: , Rfl:   •  esomeprazole (nexIUM) 40 MG capsule,  Maintenance, 04/09/22 11:44:00 EDT, Disp: , Rfl:   •  ferrous sulfate 325 (65 FE) MG EC tablet, Take 325 mg by mouth 3 (Three) Times a Day., Disp: , Rfl:   •  metFORMIN ER (GLUCOPHAGE-XR) 500 MG 24 hr tablet, Take 2,000 mg by mouth Daily., Disp: , Rfl:   •  pioglitazone (ACTOS) 15 MG tablet, , Disp: , Rfl:   •  rosuvastatin (CRESTOR) 10 MG tablet, , Disp: , Rfl:   •  sertraline (ZOLOFT) 100 MG tablet, , Disp: , Rfl:   •  terazosin (HYTRIN) 10 MG capsule,  Maintenance, 04/09/22 11:45:00 EDT, Disp:  , Rfl:   •  Vitamin E 400 units tablet, Take 800 Units by mouth Daily., Disp: 60 each, Rfl: 11  •  montelukast (SINGULAIR) 10 MG tablet, Take 10 mg by mouth every night at bedtime., Disp: , Rfl:   •  spironolactone (Aldactone) 100 MG tablet, Take 1 tablet by mouth Daily., Disp: 90 tablet, Rfl: 3    No Known Allergies    Social History     Socioeconomic History   • Marital status: Single   Tobacco Use   • Smoking status: Former Smoker     Packs/day: 1.00     Years: 15.00     Pack years: 15.00     Types: Cigarettes     Start date: 1971     Quit date: 1984     Years since quittin.0   • Smokeless tobacco: Never Used   • Tobacco comment: quit 20 plus years ago    Substance and Sexual Activity   • Alcohol use: Not Currently   • Drug use: Not Currently     Types: Marijuana   • Sexual activity: Not Currently     Partners: Female       Family History   Problem Relation Age of Onset   • Cirrhosis Mother    • Liver disease Sister    • Malig Hyperthermia Neg Hx        Review of Systems   Constitutional: Negative.    Respiratory: Positive for apnea, shortness of breath and wheezing. Negative for cough, choking, chest tightness and stridor.    Cardiovascular: Negative.    Gastrointestinal: Negative.    Musculoskeletal: Negative.    Skin: Negative.    Hematological: Negative.        Vitals:    22 1033   BP: 156/81   Pulse: 73   Temp: 96.2 °F (35.7 °C)       Physical Exam  Vitals reviewed.   Constitutional:       Appearance: Normal appearance. He is well-developed. He is obese.   HENT:      Head: Normocephalic and atraumatic.   Eyes:      General: No scleral icterus.     Pupils: Pupils are equal, round, and reactive to light.   Cardiovascular:      Pulses: Normal pulses.      Heart sounds: Normal heart sounds.   Pulmonary:      Effort: Pulmonary effort is normal. No respiratory distress.      Breath sounds: Normal breath sounds.   Abdominal:      General: Bowel sounds are normal. There is distension.       Palpations: Abdomen is soft. There is no mass.      Tenderness: There is no abdominal tenderness.      Hernia: No hernia is present.      Comments: Positive shifting dullness   Musculoskeletal:         General: Swelling present. No tenderness. Normal range of motion.      Right lower leg: Edema present.      Left lower leg: Edema present.   Skin:     General: Skin is warm and dry.      Coloration: Skin is not jaundiced.      Findings: No rash.   Neurological:      General: No focal deficit present.      Mental Status: He is alert and oriented to person, place, and time.      Cranial Nerves: No cranial nerve deficit.   Psychiatric:         Behavior: Behavior normal.         Thought Content: Thought content normal.         Judgment: Judgment normal.         Hospital Outpatient Visit on 05/11/2022   Component Date Value Ref Range Status   • Anaerobic Culture 05/11/2022 No anaerobes isolated at 5 days   Final   • Body Fluid Culture 05/11/2022 No growth at 5 days   Final   • Gram Stain 05/11/2022 Rare (1+) WBCs per low power field   Final   • Gram Stain 05/11/2022 No organisms seen   Final   • Protein, Total, Fluid 05/11/2022 3.5  g/dL Final   • Case Report 05/11/2022    Final                    Value:Medical Cytology Report                           Case: [id number]                                  Authorizing Provider:  Britta Boyle APRN      Collected:           05/11/2022 11:58 AM          Ordering Location:     [facility]   Received:            05/12/2022 09:06 AM                                 CARE                                                                         Pathologist:           Saji Miranda MD                                                            Specimen:    Abdomen                                                                                   • Final Diagnosis 05/11/2022    Final                    Value:This result contains rich text formatting which cannot be displayed  here.   • Clinical Information 05/11/2022    Final                    Value:This result contains rich text formatting which cannot be displayed here.   • Gross Description 05/11/2022    Final                    Value:This result contains rich text formatting which cannot be displayed here.   • Lactate Dehydrogenase (LD), Fluid 05/11/2022 75  U/L Final   • Glucose, Fluid 05/11/2022 186  mg/dL Final   • Amylase, Fluid 05/11/2022 19  U/L Final   • Total Bilirubin 05/11/2022 0.3  mg/dL Final   • Albumin, Fluid 05/11/2022 2.30  g/dL Final   • Color, Fluid 05/11/2022 Yellow   Final   • Appearance, Fluid 05/11/2022 Clear  Clear Final   • RBC, Fluid 05/11/2022 941  /mm3 Final   • Nucleated Cells, Fluid 05/11/2022 359  /mm3 Final   • Method: 05/11/2022 Automated DXH Analyzer   Final   • Neutrophils, Fluid 05/11/2022 4  % Final   • Lymphocytes, Fluid 05/11/2022 70  % Final   • Monocytes, Fluid 05/11/2022 26  % Final   Office Visit on 05/09/2022   Component Date Value Ref Range Status   • WBC 05/09/2022 2.5 (A) 3.4 - 10.8 x10E3/uL Final   • RBC 05/09/2022 3.99 (A) 4.14 - 5.80 x10E6/uL Final   • Hemoglobin 05/09/2022 9.6 (A) 13.0 - 17.7 g/dL Final   • Hematocrit 05/09/2022 32.0 (A) 37.5 - 51.0 % Final   • MCV 05/09/2022 80  79 - 97 fL Final   • MCH 05/09/2022 24.1 (A) 26.6 - 33.0 pg Final   • MCHC 05/09/2022 30.0 (A) 31.5 - 35.7 g/dL Final   • RDW 05/09/2022 15.8 (A) 11.6 - 15.4 % Final   • Platelets 05/09/2022 105 (A) 150 - 450 x10E3/uL Final   • Neutrophil Rel % 05/09/2022 70  Not Estab. % Final   • Lymphocyte Rel % 05/09/2022 21  Not Estab. % Final   • Monocyte Rel % 05/09/2022 8  Not Estab. % Final   • Eosinophil Rel % 05/09/2022 1  Not Estab. % Final   • Basophil Rel % 05/09/2022 0  Not Estab. % Final   • Neutrophils Absolute 05/09/2022 1.8  1.4 - 7.0 x10E3/uL Final   • Lymphocytes Absolute 05/09/2022 0.5 (A) 0.7 - 3.1 x10E3/uL Final   • Monocytes Absolute 05/09/2022 0.2  0.1 - 0.9 x10E3/uL Final   • Eosinophils Absolute  05/09/2022 0.0  0.0 - 0.4 x10E3/uL Final   • Basophils Absolute 05/09/2022 0.0  0.0 - 0.2 x10E3/uL Final   • Immature Granulocyte Rel % 05/09/2022 0  Not Estab. % Final   • Immature Grans Absolute 05/09/2022 0.0  0.0 - 0.1 x10E3/uL Final   • Glucose 05/09/2022 206 (A) 65 - 99 mg/dL Final   • BUN 05/09/2022 24  8 - 27 mg/dL Final   • Creatinine 05/09/2022 1.33 (A) 0.76 - 1.27 mg/dL Final   • EGFR Result 05/09/2022 59 (A) >59 mL/min/1.73 Final   • BUN/Creatinine Ratio 05/09/2022 18  10 - 24 Final   • Sodium 05/09/2022 140  134 - 144 mmol/L Final   • Potassium 05/09/2022 4.5  3.5 - 5.2 mmol/L Final   • Chloride 05/09/2022 103  96 - 106 mmol/L Final   • Total CO2 05/09/2022 23  20 - 29 mmol/L Final   • Calcium 05/09/2022 9.3  8.6 - 10.2 mg/dL Final   • Total Protein 05/09/2022 7.1  6.0 - 8.5 g/dL Final   • Albumin 05/09/2022 3.8  3.8 - 4.8 g/dL Final   • Globulin 05/09/2022 3.3  1.5 - 4.5 g/dL Final   • A/G Ratio 05/09/2022 1.2  1.2 - 2.2 Final   • Total Bilirubin 05/09/2022 0.4  0.0 - 1.2 mg/dL Final   • Alkaline Phosphatase 05/09/2022 131 (A) 44 - 121 IU/L Final   • AST (SGOT) 05/09/2022 15  0 - 40 IU/L Final   • ALT (SGPT) 05/09/2022 9  0 - 44 IU/L Final   • INR 05/09/2022 1.1  0.9 - 1.2 Final   • Protime 05/09/2022 11.6  9.1 - 12.0 sec Final   • AFP Tumor Marker 05/09/2022 1.6  0.0 - 8.4 ng/mL Final       Diagnoses and all orders for this visit:    1. Other cirrhosis of liver (HCC) (Primary)  -     Basic Metabolic Panel; Future  -     US Paracentesis; Future    2. Other ascites  -     Basic Metabolic Panel; Future  -     US Paracentesis; Future    3. PERALES (nonalcoholic steatohepatitis)  -     Basic Metabolic Panel; Future  -     US Paracentesis; Future    Other orders  -     spironolactone (Aldactone) 100 MG tablet; Take 1 tablet by mouth Daily.  Dispense: 90 tablet; Refill: 3      Patient 66-year-old male with history of [place] related cirrhosis and ascites here with recurrent ascites.  Patient reports return of mild  shortness of breath though much better than it was before the prior paracentesis.  Patient denies any nausea vomiting no melena no bright red blood per rectum.  Patient denies any fever or chills or abdominal pain.  We will proceed with repeat paracentesis due to shortness of breath recommend beginning Aldactone 100 mg daily and follow-up labs in 4 weeks to check on potassium levels.

## 2022-06-03 ENCOUNTER — TELEPHONE (OUTPATIENT)
Dept: GASTROENTEROLOGY | Facility: CLINIC | Age: 67
End: 2022-06-03

## 2022-06-03 DIAGNOSIS — R18.8 OTHER ASCITES: ICD-10-CM

## 2022-06-03 DIAGNOSIS — K75.81 NASH (NONALCOHOLIC STEATOHEPATITIS): ICD-10-CM

## 2022-06-03 DIAGNOSIS — K74.69 OTHER CIRRHOSIS OF LIVER: Primary | ICD-10-CM

## 2022-06-03 RX ORDER — ALBUMIN (HUMAN) 12.5 G/50ML
25 SOLUTION INTRAVENOUS ONCE
Status: CANCELLED | OUTPATIENT
Start: 2022-06-03

## 2022-06-03 NOTE — TELEPHONE ENCOUNTER
Per  [facility] ambulatory care, the paracentesis order needs to be reentered. There needed to be a # of times to be performed and the information regarding labs and albumin replacement.  Order entered, sent to Dr Almeida to cosign.

## 2022-06-06 ENCOUNTER — HOSPITAL ENCOUNTER (OUTPATIENT)
Dept: INFUSION THERAPY | Facility: HOSPITAL | Age: 67
Discharge: HOME | End: 2022-06-06
Admitting: HOSPITALIST

## 2022-06-06 VITALS
SYSTOLIC BLOOD PRESSURE: 139 MMHG | OXYGEN SATURATION: 99 % | DIASTOLIC BLOOD PRESSURE: 55 MMHG | RESPIRATION RATE: 16 BRPM | HEART RATE: 79 BPM

## 2022-06-06 DIAGNOSIS — K74.69 OTHER CIRRHOSIS OF LIVER: ICD-10-CM

## 2022-06-06 DIAGNOSIS — K75.81 NASH (NONALCOHOLIC STEATOHEPATITIS): Primary | ICD-10-CM

## 2022-06-06 DIAGNOSIS — R18.8 OTHER ASCITES: ICD-10-CM

## 2022-06-06 LAB
ALBUMIN FLD-MCNC: 2.2 G/DL
APPEARANCE FLD: CLEAR
BASOPHILS # BLD AUTO: 0 10*3/MM3 (ref 0–0.2)
BASOPHILS NFR BLD AUTO: 0.7 % (ref 0–1.5)
COLOR FLD: YELLOW
DEPRECATED RDW RBC AUTO: 48.1 FL (ref 37–54)
EOSINOPHIL # BLD AUTO: 0 10*3/MM3 (ref 0–0.4)
EOSINOPHIL NFR BLD AUTO: 1.6 % (ref 0.3–6.2)
ERYTHROCYTE [DISTWIDTH] IN BLOOD BY AUTOMATED COUNT: 17.8 % (ref 12.3–15.4)
HCT VFR BLD AUTO: 30.4 % (ref 37.5–51)
HGB BLD-MCNC: 9.5 G/DL (ref 13–17.7)
INR PPP: 1.2 (ref 0.93–1.1)
LYMPHOCYTES # BLD AUTO: 0.5 10*3/MM3 (ref 0.7–3.1)
LYMPHOCYTES NFR BLD AUTO: 16.8 % (ref 19.6–45.3)
LYMPHOCYTES NFR FLD MANUAL: 62 %
MCH RBC QN AUTO: 23.8 PG (ref 26.6–33)
MCHC RBC AUTO-ENTMCNC: 31.1 G/DL (ref 31.5–35.7)
MCV RBC AUTO: 76.6 FL (ref 79–97)
MESOTHL CELL NFR FLD MANUAL: 21 %
METHOD: NORMAL
MONOCYTES # BLD AUTO: 0.2 10*3/MM3 (ref 0.1–0.9)
MONOCYTES NFR BLD AUTO: 8.8 % (ref 5–12)
MONOCYTES NFR FLD: 16 %
NEUTROPHILS NFR BLD AUTO: 1.9 10*3/MM3 (ref 1.7–7)
NEUTROPHILS NFR BLD AUTO: 72.1 % (ref 42.7–76)
NEUTROPHILS NFR FLD MANUAL: 1 %
NRBC BLD AUTO-RTO: 0.1 /100 WBC (ref 0–0.2)
NUC CELL # FLD: 341 /MM3
PLATELET # BLD AUTO: 121 10*3/MM3 (ref 140–450)
PMV BLD AUTO: 7.8 FL (ref 6–12)
PROTHROMBIN TIME: 12.2 SECONDS (ref 9.6–11.7)
RBC # BLD AUTO: 3.97 10*6/MM3 (ref 4.14–5.8)
WBC NRBC COR # BLD: 2.7 10*3/MM3 (ref 3.4–10.8)

## 2022-06-06 PROCEDURE — 49082 ABD PARACENTESIS: CPT | Performed by: HOSPITALIST

## 2022-06-06 PROCEDURE — 87205 SMEAR GRAM STAIN: CPT | Performed by: INTERNAL MEDICINE

## 2022-06-06 PROCEDURE — 76942 ECHO GUIDE FOR BIOPSY: CPT

## 2022-06-06 PROCEDURE — 85025 COMPLETE CBC W/AUTO DIFF WBC: CPT | Performed by: INTERNAL MEDICINE

## 2022-06-06 PROCEDURE — 25010000002 ALBUMIN HUMAN 25% PER 50 ML: Performed by: INTERNAL MEDICINE

## 2022-06-06 PROCEDURE — 82042 OTHER SOURCE ALBUMIN QUAN EA: CPT

## 2022-06-06 PROCEDURE — 96365 THER/PROPH/DIAG IV INF INIT: CPT

## 2022-06-06 PROCEDURE — 87070 CULTURE OTHR SPECIMN AEROBIC: CPT | Performed by: INTERNAL MEDICINE

## 2022-06-06 PROCEDURE — 85610 PROTHROMBIN TIME: CPT

## 2022-06-06 PROCEDURE — P9047 ALBUMIN (HUMAN), 25%, 50ML: HCPCS | Performed by: INTERNAL MEDICINE

## 2022-06-06 PROCEDURE — 89051 BODY FLUID CELL COUNT: CPT | Performed by: INTERNAL MEDICINE

## 2022-06-06 RX ORDER — ALBUMIN (HUMAN) 12.5 G/50ML
25 SOLUTION INTRAVENOUS ONCE
Status: COMPLETED | OUTPATIENT
Start: 2022-06-06 | End: 2022-06-06

## 2022-06-06 RX ADMIN — ALBUMIN (HUMAN) 25 G: 0.25 INJECTION, SOLUTION INTRAVENOUS at 09:16

## 2022-06-06 RX ADMIN — ALBUMIN (HUMAN) 25 G: 0.25 INJECTION, SOLUTION INTRAVENOUS at 09:56

## 2022-06-06 NOTE — PROGRESS NOTES
PARACENTESIS    Time Arrived in Department: 0730      Consent: yes  Allergies have been Reviewed: yes      ID Band Verified: yes    Method: Ambulatory    Lab Results: Checked    Physician: Jarvis      Nurse: Lise Martell RN    IR Care Plan: Person Educated - Patient, Current Knowledge - Understands, Learning Barrier - None, Readiness to Learn - Acceptance, Teaching Topic - Procedure and Evaluation of Teaching - Verbalized Understanding      Neurological: Within Normal Limits    Skin: Flesh, Warm and Dry    Respiratory Status: Respirations even and unlabored    Room In: 9      Procedure: Paracentesis    Time Out Completed: yes    Time Out: 0830       Prep Time: 0833    Prep Site 1: Right Lateral Abdomen      Prep: Chlorhexidine and Sterile drape    Procedure Position: Right Side    Guidance: Ultrasound    Fluid Quality: Clear and [place] Yellow    Procedure Note: Pt tolerating well.        Intra Time 1:0850    Intra Assess 1:   LOC: Alert  Pain:  No Issues and Tolerating  Skin: Flesh, Warm and Dry  Respiratory Status:  Even and Unlabored  Intra Procedure Note 1:         Intra Time 2: 0920    Intra Assess 2:   LOC: Alert  Pain: No Issues and Tolerating  Skin: Flesh, Warm and Dry  Respiratory Status: Even and Unlabored  Intra Procedure Note 2:         Intra Time 3: 1005    Intra Assess 3:   LOC: Alert  Pain: No Issues and Tolerating  Skin: Flesh, Warm and Dry  Respiratory Status: Even and Unlabored  Intra Procedure Note 3:para completed                Post-Procedure Position: Supine and HOB Elevated    Dressing Site 1: 2x2, 4x4 and Tegaderm    Post Procedure Status:  Alert and Oriented, returned to baseline, Return to baseline, Dressing Dry/ Intact, IV documented (see flowsheet), Stable Condition and Dressing properly labeled    Specimen To Lab: yes    Total Fluid Removed: 12.5 liters    Post Procedure Note:  Pt tolerated well.            Discharge Time: 1040    Method: Ambulatory    O2 on Transfer: yes    Accompanied  By:  None    Clothes Changed:  Self    Discharged Location:  Routine to Home    Discharge Teaching:  Care of Dressing, Follow up with MD, Prescription Given and PAtient

## 2022-06-06 NOTE — PROCEDURES
"Bedside Paracentesis Without  Radiology    Date/Time: 6/6/2022 8:28 AM  Performed by: Prachi Conley MD  Authorized by: Prachi Conley MD   Consent: Verbal consent obtained. Written consent obtained.  Risks and benefits: risks, benefits and alternatives were discussed  Consent given by: patient  Patient understanding: patient states understanding of the procedure being performed  Patient consent: the patient's understanding of the procedure matches consent given  Procedure consent: procedure consent matches procedure scheduled  Relevant documents: relevant documents present and verified  Test results: test results available and properly labeled  Site marked: the operative site was marked  Imaging studies: imaging studies available  Required items: required blood products, implants, devices, and special equipment available  Patient identity confirmed: verbally with patient  Time out: Immediately prior to procedure a \"time out\" was called to verify the correct patient, procedure, equipment, support staff and site/side marked as required.  Initial or subsequent exam: subsequent  Procedure purpose: diagnostic and therapeutic  Indications: abdominal discomfort secondary to ascites  Anesthesia: local infiltration    Anesthesia:  Local Anesthetic: lidocaine 1% without epinephrine    Sedation:  Patient sedated: no    Preparation: Patient was prepped and draped in the usual sterile fashion.  Needle gauge: 22  Ultrasound guidance: yes  Puncture site: right lower quadrant  Fluid appearance: serous  Patient tolerance: patient tolerated the procedure well with no immediate complications  Comments: Please see nurses notes for amount of fluid removed.      Electronically signed by Prachi Conley MD, 06/06/22, 9:19 AM EDT.    "

## 2022-06-11 LAB
BACTERIA FLD CULT: NORMAL
GRAM STN SPEC: NORMAL
GRAM STN SPEC: NORMAL

## 2022-06-23 DIAGNOSIS — R18.8 OTHER ASCITES: Primary | ICD-10-CM

## 2022-06-23 RX ORDER — ALBUMIN (HUMAN) 12.5 G/50ML
12.5 SOLUTION INTRAVENOUS DAILY PRN
Status: CANCELLED | OUTPATIENT
Start: 2022-06-24

## 2022-06-23 RX ORDER — ALBUMIN (HUMAN) 12.5 G/50ML
25 SOLUTION INTRAVENOUS DAILY PRN
Status: CANCELLED | OUTPATIENT
Start: 2022-06-24

## 2022-06-23 RX ORDER — ALBUMIN (HUMAN) 12.5 G/50ML
25 SOLUTION INTRAVENOUS ONCE
Status: CANCELLED | OUTPATIENT
Start: 2022-06-23 | End: 2022-06-23

## 2022-06-24 ENCOUNTER — HOSPITAL ENCOUNTER (OUTPATIENT)
Dept: INFUSION THERAPY | Facility: HOSPITAL | Age: 67
Discharge: HOME | End: 2022-06-24
Admitting: INTERNAL MEDICINE

## 2022-06-24 VITALS
RESPIRATION RATE: 14 BRPM | SYSTOLIC BLOOD PRESSURE: 123 MMHG | OXYGEN SATURATION: 100 % | DIASTOLIC BLOOD PRESSURE: 54 MMHG | HEART RATE: 74 BPM

## 2022-06-24 DIAGNOSIS — R18.8 OTHER ASCITES: Primary | ICD-10-CM

## 2022-06-24 LAB
ALBUMIN FLD-MCNC: 1.8 G/DL
ALBUMIN SERPL-MCNC: 3.4 G/DL (ref 3.5–5.2)
ALBUMIN/GLOB SERPL: 1.1 G/DL
ALP SERPL-CCNC: 118 U/L (ref 39–117)
ALT SERPL W P-5'-P-CCNC: 10 U/L (ref 1–41)
ANION GAP SERPL CALCULATED.3IONS-SCNC: 9 MMOL/L (ref 5–15)
APPEARANCE FLD: CLEAR
AST SERPL-CCNC: 14 U/L (ref 1–40)
BILIRUB SERPL-MCNC: 0.2 MG/DL (ref 0–1.2)
BUN SERPL-MCNC: 26 MG/DL (ref 8–23)
BUN/CREAT SERPL: 22 (ref 7–25)
CALCIUM SPEC-SCNC: 8.7 MG/DL (ref 8.6–10.5)
CHLORIDE SERPL-SCNC: 108 MMOL/L (ref 98–107)
CO2 SERPL-SCNC: 21 MMOL/L (ref 22–29)
COLOR FLD: YELLOW
CREAT SERPL-MCNC: 1.18 MG/DL (ref 0.76–1.27)
DEPRECATED RDW RBC AUTO: 48.6 FL (ref 37–54)
EGFRCR SERPLBLD CKD-EPI 2021: 68.1 ML/MIN/1.73
ERYTHROCYTE [DISTWIDTH] IN BLOOD BY AUTOMATED COUNT: 17.8 % (ref 12.3–15.4)
GLOBULIN UR ELPH-MCNC: 3.2 GM/DL
GLUCOSE SERPL-MCNC: 182 MG/DL (ref 65–99)
HCT VFR BLD AUTO: 28.9 % (ref 37.5–51)
HGB BLD-MCNC: 9 G/DL (ref 13–17.7)
INR PPP: 1.13 (ref 0.93–1.1)
LYMPHOCYTES NFR FLD MANUAL: 77 %
MCH RBC QN AUTO: 23.8 PG (ref 26.6–33)
MCHC RBC AUTO-ENTMCNC: 31.2 G/DL (ref 31.5–35.7)
MCV RBC AUTO: 76.2 FL (ref 79–97)
MESOTHL CELL NFR FLD MANUAL: 19 %
METHOD: NORMAL
NEUTROPHILS NFR FLD MANUAL: 4 %
NUC CELL # FLD: 281 /MM3
PLATELET # BLD AUTO: 101 10*3/MM3 (ref 140–450)
PMV BLD AUTO: 7.9 FL (ref 6–12)
POTASSIUM SERPL-SCNC: 4.8 MMOL/L (ref 3.5–5.2)
PROT SERPL-MCNC: 6.6 G/DL (ref 6–8.5)
PROTHROMBIN TIME: 11.6 SECONDS (ref 9.6–11.7)
RBC # BLD AUTO: 3.79 10*6/MM3 (ref 4.14–5.8)
SODIUM SERPL-SCNC: 138 MMOL/L (ref 136–145)
WBC NRBC COR # BLD: 2 10*3/MM3 (ref 3.4–10.8)

## 2022-06-24 PROCEDURE — 49083 ABD PARACENTESIS W/IMAGING: CPT | Performed by: HOSPITALIST

## 2022-06-24 PROCEDURE — 96365 THER/PROPH/DIAG IV INF INIT: CPT

## 2022-06-24 PROCEDURE — 25010000002 ALBUMIN HUMAN 25% PER 50 ML: Performed by: INTERNAL MEDICINE

## 2022-06-24 PROCEDURE — P9047 ALBUMIN (HUMAN), 25%, 50ML: HCPCS | Performed by: INTERNAL MEDICINE

## 2022-06-24 PROCEDURE — 85027 COMPLETE CBC AUTOMATED: CPT | Performed by: INTERNAL MEDICINE

## 2022-06-24 PROCEDURE — 89051 BODY FLUID CELL COUNT: CPT

## 2022-06-24 PROCEDURE — 80053 COMPREHEN METABOLIC PANEL: CPT | Performed by: INTERNAL MEDICINE

## 2022-06-24 PROCEDURE — 76942 ECHO GUIDE FOR BIOPSY: CPT

## 2022-06-24 PROCEDURE — 82042 OTHER SOURCE ALBUMIN QUAN EA: CPT

## 2022-06-24 PROCEDURE — 96366 THER/PROPH/DIAG IV INF ADDON: CPT

## 2022-06-24 PROCEDURE — 36415 COLL VENOUS BLD VENIPUNCTURE: CPT

## 2022-06-24 PROCEDURE — 85610 PROTHROMBIN TIME: CPT | Performed by: INTERNAL MEDICINE

## 2022-06-24 RX ORDER — ALBUMIN (HUMAN) 12.5 G/50ML
25 SOLUTION INTRAVENOUS DAILY PRN
Status: DISCONTINUED | OUTPATIENT
Start: 2022-06-24 | End: 2022-06-26 | Stop reason: HOSPADM

## 2022-06-24 RX ORDER — ALBUMIN (HUMAN) 12.5 G/50ML
12.5 SOLUTION INTRAVENOUS DAILY PRN
Status: DISCONTINUED | OUTPATIENT
Start: 2022-06-24 | End: 2022-06-26 | Stop reason: HOSPADM

## 2022-06-24 RX ADMIN — ALBUMIN (HUMAN) 25 G: 0.25 INJECTION, SOLUTION INTRAVENOUS at 09:07

## 2022-06-24 RX ADMIN — ALBUMIN (HUMAN) 25 G: 0.25 INJECTION, SOLUTION INTRAVENOUS at 10:23

## 2022-06-24 RX ADMIN — ALBUMIN (HUMAN) 25 G: 0.25 INJECTION, SOLUTION INTRAVENOUS at 09:42

## 2022-06-24 RX ADMIN — ALBUMIN (HUMAN) 12.5 G: 0.25 INJECTION, SOLUTION INTRAVENOUS at 11:20

## 2022-06-24 NOTE — PROGRESS NOTES
PARACENTESIS     Time Arrived in Department: 0735      Consent: yes  Allergies have been Reviewed: yes      ID Band Verified: yes    Method: Ambulatory    Lab Results: Checked and MD Notified     Physician: Dr. LAN Conley      Nurse: Belkys Phelps RN    IR Care Plan: Person Educated - Patient, Current Knowledge - Understands, Learning Barrier - None, Readiness to Learn - Acceptance, Teaching Topic - Procedure and Evaluation of Teaching - Verbalized Understanding      Neurological: Within Normal Limits    Skin: Warm, Dry and Jaundice    Respiratory Status: Respirations even and unlabored    Room In: Ambulatory Dept Room 9      Procedure: Paracentesis    Time Out Completed: yes    Time Out: 0840    Prep Site 1: Right Lateral Abdomen      Prep: Chlorhexidine and Sterile drape    Procedure Position: Right Side and Semi Fowlers    Guidance: Ultrasound    Fluid Quality: Clear and Yellow    Procedure Note: Procedure start at 0844. 1% lidocaine used. No complications noted. Specimens taken to lab.        Intra Time 1:0900    Intra Assess 1:   LOC: Alert  Pain:  No Issues and Tolerating  Skin: Warm, Dry and Jaundice  Respiratory Status:  Even and Unlabored  Intra Procedure Note 1:         Intra Time 2: 0930    Intra Assess 2:   LOC: Sleeping  Pain: No Issues, Tolerating and Sleeping  Skin: Warm, Dry and Jaundice  Respiratory Status: Even and Unlabored  Intra Procedure Note 2:         Intra Time 3: 1000    Intra Assess 3:   LOC: Sleeping  Pain: No Issues, Tolerating and Sleeping  Skin: Warm, Dry and Jaundice  Respiratory Status: Even and Unlabored  Intra Procedure Note 3:        Intra Time 4: 1030    Intra Assess 4:   LOC: Sleeping  Pain: No Issues, Tolerating and Sleeping  Skin: Warm, Dry and Jaundice  Respiratory Status: Even and Unlabored  Intra Procedure Note 4: para complete        Post-Procedure Position: Supine and HOB Elevated    Dressing Site 1: 2x2, 4x4 and Tegaderm    Post Procedure Status:  Alert and Oriented,  returned to baseline, Dressing Dry/ Intact, IV documented (see flowsheet), Stable Condition and Dressing properly labeled    Specimen To Lab: yes    Total Fluid Removed: 11.8 liters    Post Procedure Note:  Procedure complete at 10:30. 87.5 grams of albumin given per MD order. No complications noted.        Report Given To:      Admit/Transfer To:      Transfer Time:      Discharge Time: 1150    Method: Ambulatory    O2 on Transfer:      Accompanied By:  None    Clothes Changed:  Self    Discharged Location:  Routine to Home    Discharge Teaching:  Care of Dressing and Follow up with MD

## 2022-06-29 NOTE — PROCEDURES
Bedside Paracentesis Without  Radiology    Date/Time: 6/24/2022 8:30 AM  Performed by: Prachi Conley MD  Authorized by: Prachi Conley MD   Consent: Verbal consent obtained. Written consent obtained.  Risks and benefits: risks, benefits and alternatives were discussed  Consent given by: patient  Patient understanding: patient states understanding of the procedure being performed  Patient consent: the patient's understanding of the procedure matches consent given  Procedure consent: procedure consent matches procedure scheduled  Relevant documents: relevant documents present and verified  Test results: test results available and properly labeled  Site marked: the operative site was marked  Imaging studies: imaging studies available  Required items: required blood products, implants, devices, and special equipment available  Patient identity confirmed: verbally with patient  Initial or subsequent exam: subsequent  Procedure purpose: therapeutic and diagnostic  Indications: abdominal discomfort secondary to ascites  Anesthesia: local infiltration    Anesthesia:  Local Anesthetic: lidocaine 1% without epinephrine    Sedation:  Patient sedated: no    Preparation: Patient was prepped and draped in the usual sterile fashion.  Needle gauge: 22  Ultrasound guidance: yes  Puncture site: right lower quadrant  Fluid appearance: serous  Patient tolerance: patient tolerated the procedure well with no immediate complications  Comments: Please see nurses notes for amount of fluid removed      Electronically signed by Prachi Conley MD, 06/29/22, 6:59 AM EDT.

## 2022-06-29 NOTE — ADDENDUM NOTE
Encounter addended by: Prachi Conley MD on: 6/29/2022 7:02 AM   Actions taken: Child order released for a procedure order, Diagnosis association updated, Clinical Note Signed, Charge Capture section accepted

## 2022-06-30 ENCOUNTER — TELEPHONE (OUTPATIENT)
Dept: GASTROENTEROLOGY | Facility: CLINIC | Age: 67
End: 2022-06-30

## 2022-06-30 NOTE — TELEPHONE ENCOUNTER
Patient called. Advised received a pc from Dianne in scheduling. He states he is doing well and at this time does not need a paracentesis.

## 2022-06-30 NOTE — TELEPHONE ENCOUNTER
----- Message from Aydee Roy sent at 6/30/2022  2:01 PM EDT -----  Regarding: Req to speak with a nurse  Dianne calling from Radiology scheduling stating that patient called them requesting an ultrasound order to be put in for Paracentric.      Dianne's call back number: [phone]    Please assist.

## 2022-07-08 ENCOUNTER — TELEPHONE (OUTPATIENT)
Dept: GASTROENTEROLOGY | Facility: CLINIC | Age: 67
End: 2022-07-08

## 2022-07-08 ENCOUNTER — PREP FOR SURGERY (OUTPATIENT)
Dept: OTHER | Facility: HOSPITAL | Age: 67
End: 2022-07-08

## 2022-07-08 DIAGNOSIS — R18.8 OTHER ASCITES: ICD-10-CM

## 2022-07-08 DIAGNOSIS — K75.81 NASH (NONALCOHOLIC STEATOHEPATITIS): Primary | ICD-10-CM

## 2022-07-08 RX ORDER — ALBUMIN (HUMAN) 12.5 G/50ML
25 SOLUTION INTRAVENOUS ONCE
Status: CANCELLED | OUTPATIENT
Start: 2022-07-08

## 2022-07-08 NOTE — TELEPHONE ENCOUNTER
Returned patient's phone call.   He states patient states he is more short of air, reports weight gain of 20 lbs and his abdomen is tight.   Reports having diarrhea several times a day.   Requesting a paracentesis.   Update to Britta.

## 2022-07-08 NOTE — TELEPHONE ENCOUNTER
Caller: Lei Mercado    Relationship to patient: Self    Best call back number: [phone]    Type of visit: PARACENTESIS ORDER FOR [facility]    Requested date: 7/8/2022    Additional notes: PATIENT IS REQUESTING AN ORDER TO BE SENT FOR THIS PROCEDURE TO THE [facility] PLEASE CALL PATIENT.

## 2022-07-08 NOTE — TELEPHONE ENCOUNTER
Caller: Lei Mercado    Relationship to patient: Self    Best call back number: [phone]    Patient is needing: PATIENT GAVE FAX #[phone] FOR [facility] FOR TE  PARACENTESIS ORDER TO BE SENT.    PATIENT CONFIRMED AT 2PM TODAY AND THE ORDER HAD NOT BEEN RECEIVED YET. HE IS TRYING TO SCHEDULE THIS FOR Monday MORNING.    PLEASE CALL THE PATIENT. THANK YOU.

## 2022-07-08 NOTE — TELEPHONE ENCOUNTER
Order signed.   Call placed to [facility] scheduling department.   Spoke with a  who will call the patient to schedule the paracentesis.

## 2022-07-11 ENCOUNTER — HOSPITAL ENCOUNTER (OUTPATIENT)
Dept: INFUSION THERAPY | Facility: HOSPITAL | Age: 67
Discharge: HOME | End: 2022-07-11
Admitting: INTERNAL MEDICINE

## 2022-07-11 ENCOUNTER — APPOINTMENT (OUTPATIENT)
Dept: INFUSION THERAPY | Facility: HOSPITAL | Age: 67
End: 2022-07-11

## 2022-07-11 VITALS
SYSTOLIC BLOOD PRESSURE: 119 MMHG | HEART RATE: 70 BPM | OXYGEN SATURATION: 99 % | RESPIRATION RATE: 14 BRPM | DIASTOLIC BLOOD PRESSURE: 50 MMHG

## 2022-07-11 DIAGNOSIS — R18.8 OTHER ASCITES: ICD-10-CM

## 2022-07-11 DIAGNOSIS — K74.69 OTHER CIRRHOSIS OF LIVER: ICD-10-CM

## 2022-07-11 DIAGNOSIS — K75.81 NASH (NONALCOHOLIC STEATOHEPATITIS): ICD-10-CM

## 2022-07-11 LAB
ALBUMIN FLD-MCNC: 1.7 G/DL
APPEARANCE FLD: CLEAR
COLOR FLD: YELLOW
METHOD: NORMAL
NUC CELL # FLD: 332 /MM3

## 2022-07-11 PROCEDURE — 89051 BODY FLUID CELL COUNT: CPT | Performed by: NURSE PRACTITIONER

## 2022-07-11 PROCEDURE — P9047 ALBUMIN (HUMAN), 25%, 50ML: HCPCS | Performed by: INTERNAL MEDICINE

## 2022-07-11 PROCEDURE — 87205 SMEAR GRAM STAIN: CPT | Performed by: NURSE PRACTITIONER

## 2022-07-11 PROCEDURE — 87070 CULTURE OTHR SPECIMN AEROBIC: CPT | Performed by: NURSE PRACTITIONER

## 2022-07-11 PROCEDURE — 25010000002 ALBUMIN HUMAN 25% PER 50 ML: Performed by: INTERNAL MEDICINE

## 2022-07-11 PROCEDURE — 49083 ABD PARACENTESIS W/IMAGING: CPT | Performed by: INTERNAL MEDICINE

## 2022-07-11 PROCEDURE — 76942 ECHO GUIDE FOR BIOPSY: CPT

## 2022-07-11 PROCEDURE — 82042 OTHER SOURCE ALBUMIN QUAN EA: CPT | Performed by: INTERNAL MEDICINE

## 2022-07-11 RX ORDER — ALBUMIN (HUMAN) 12.5 G/50ML
25 SOLUTION INTRAVENOUS ONCE
Status: DISCONTINUED | OUTPATIENT
Start: 2022-07-11 | End: 2022-07-13 | Stop reason: HOSPADM

## 2022-07-11 RX ORDER — ALBUMIN (HUMAN) 12.5 G/50ML
25 SOLUTION INTRAVENOUS DAILY PRN
Status: DISCONTINUED | OUTPATIENT
Start: 2022-07-11 | End: 2022-07-13 | Stop reason: HOSPADM

## 2022-07-11 RX ORDER — ALBUMIN (HUMAN) 12.5 G/50ML
12.5 SOLUTION INTRAVENOUS DAILY PRN
Status: DISCONTINUED | OUTPATIENT
Start: 2022-07-11 | End: 2022-07-13 | Stop reason: HOSPADM

## 2022-07-11 RX ADMIN — ALBUMIN (HUMAN) 25 G: 0.25 INJECTION, SOLUTION INTRAVENOUS at 10:54

## 2022-07-11 RX ADMIN — ALBUMIN (HUMAN) 25 G: 0.25 INJECTION, SOLUTION INTRAVENOUS at 09:45

## 2022-07-11 RX ADMIN — ALBUMIN (HUMAN) 25 G: 0.25 INJECTION, SOLUTION INTRAVENOUS at 10:19

## 2022-07-11 RX ADMIN — ALBUMIN (HUMAN) 12.5 G: 0.25 INJECTION, SOLUTION INTRAVENOUS at 11:32

## 2022-07-11 NOTE — PROCEDURES
Procedure:Ultrasound-guided Paracentesis    Consent: Informed    Pre-op diagnosis: Cirrhosis, Massive ascites    Post-op diagnosis: Cirrhosis, Massive ascites    Anesthesia Provider: .Pranav Cantu MD    Anesthesia type: Local infiltration with 1% Xylocaine    Surgeon: Dayanara Cantu MD    Assistant: none    Procedure summary:    Allergies , labs and medications were reviewed. Patient was made to lie supine and the area of interest was imaged with ultrasound and fluid verified and marked.  Area of interest was cleaned and draped in a sterile fashion. Subsequently local infiltration with Xylocaine. Trocar and cannula were advanced. Upon verification of the fluid, trocar was steadied and cannula advanced further. Trocar was drawn out. Cannula was connected to extension tubing and to the vacuum bottle. Subsequently the cannula was removed after completion of the procedure.     Findings: Clear straw yellow fluid was obtained. See nursing documentation for volume drained.    Lab Specimen: 40 mL sent for analysis    Complication: none    EBL: 0 mL    Post-op condition: Stable. Albumin was given by protocol if needed.    Post-op plan: Discharge back to the referring physician.

## 2022-07-11 NOTE — PROGRESS NOTES
PARACENTESIS    Time Arrived in Department: 0810      Consent: yes  Allergies have been Reviewed: yes      ID Band Verified: yes    Method: Ambulatory    Lab Results: Checked    Physician: NAEEM Cantu      Nurse: Dianne Graham RN    IR Care Plan: Person Educated - Patient, Current Knowledge - Understands and Evaluation of Teaching - Verbalized Understanding      Neurological: Within Normal Limits    Skin: Flesh, Warm and Dry    Respiratory Status: Respirations even and unlabored    Room In: 8 ASC      Procedure: Paracentesis    Time Out Completed: yes    Time Out: 0930    Prep Time: 0933    Prep Site 1: Right Lateral Abdomen      Prep: Chlorhexidine and Sterile drape    Procedure Position: Right Side    Guidance: Ultrasound    Fluid Quality: Cloudy and Light Yellow    Procedure Note: 1% lidocaine used, pt tolerated well        Intra Time 1:0940    Intra Assess 1:   LOC: Alert  Pain:  No Issues  Skin: Flesh, Warm and Dry  Respiratory Status:  Even and Unlabored  Intra Procedure Note 1:         Intra Time 2: 1010    Intra Assess 2:   LOC: Alert  Pain: No Issues  Skin: Flesh, Warm and Dry  Respiratory Status: Even and Unlabored  Intra Procedure Note 2:         Intra Time 3: 1040    Intra Assess 3:   LOC: Alert  Pain: No Issues  Skin: Flesh, Warm and Dry  Respiratory Status: Even and Unlabored  Intra Procedure Note 3:para completed                Post-Procedure Position: HOB Elevated    Dressing Site 1: 2x2 and Tegaderm    Post Procedure Status:  Alert and Oriented, returned to baseline    Specimen To Lab: yes    Total Fluid Removed: 11.7 liters    Post Procedure Note:          Report Given To:     Admit/Transfer To:     Transfer Time:     Discharge Time: 1200    Method: Ambulatory    O2 on Transfer: no    Accompanied By:  None    Clothes Changed:  Self    Discharged Location:  Routine to Home    Discharge Teaching:  Inpatient, Care of Dressing, Follow up with MD and PAtient

## 2022-07-13 LAB
LYMPHOCYTES NFR FLD MANUAL: 52 %
MACROPHAGE FLUID: 1 %
MESOTHL CELL NFR FLD MANUAL: 43 %
NEUTROPHILS NFR FLD MANUAL: 4 %
UNCLASSIFIED CELLS, FLUID: NORMAL

## 2022-07-16 LAB
BACTERIA FLD CULT: NORMAL
GRAM STN SPEC: NORMAL
GRAM STN SPEC: NORMAL

## 2022-07-22 ENCOUNTER — TELEPHONE (OUTPATIENT)
Dept: GASTROENTEROLOGY | Facility: CLINIC | Age: 67
End: 2022-07-22

## 2022-07-22 DIAGNOSIS — R18.8 OTHER ASCITES: ICD-10-CM

## 2022-07-22 DIAGNOSIS — K75.81 NASH (NONALCOHOLIC STEATOHEPATITIS): Primary | ICD-10-CM

## 2022-07-22 RX ORDER — ALBUMIN (HUMAN) 12.5 G/50ML
25 SOLUTION INTRAVENOUS ONCE
Status: CANCELLED | OUTPATIENT
Start: 2022-07-22

## 2022-07-22 NOTE — TELEPHONE ENCOUNTER
Patient called. Advised his paracentesis order has been signed and he can call Malvin Baptiste to schedule.

## 2022-07-22 NOTE — TELEPHONE ENCOUNTER
Caller: Michael More    Relationship: Self    Best call back number:[phone]    What is the best time to reach you: ANY    Who are you requesting to speak with (clinical staff, provider,  specific staff member): CLINICAL STAFF    Do you know the name of the person who called: MICHAEL MORE    What was the call regarding: PT NEEDS TO SPEAK WITH DR. WALLACE, DR ALCARAZ OR NURSE PT NEEDS AN ORDER FOR A PARASYNTHESIS SENT TO [facility] [phone] THEY HAVE AN OPENING THIS AFTERNOON BUT HAS TO HAVE THE ORDER ASAP    Do you require a callback: YES

## 2022-07-26 ENCOUNTER — HOSPITAL ENCOUNTER (OUTPATIENT)
Dept: INFUSION THERAPY | Facility: HOSPITAL | Age: 67
Discharge: HOME | End: 2022-07-26
Admitting: INTERNAL MEDICINE

## 2022-07-26 VITALS
RESPIRATION RATE: 20 BRPM | HEART RATE: 72 BPM | SYSTOLIC BLOOD PRESSURE: 158 MMHG | DIASTOLIC BLOOD PRESSURE: 59 MMHG | OXYGEN SATURATION: 100 %

## 2022-07-26 DIAGNOSIS — R18.8 OTHER ASCITES: ICD-10-CM

## 2022-07-26 DIAGNOSIS — K75.81 NASH (NONALCOHOLIC STEATOHEPATITIS): Primary | ICD-10-CM

## 2022-07-26 DIAGNOSIS — K74.69 OTHER CIRRHOSIS OF LIVER: ICD-10-CM

## 2022-07-26 LAB
APPEARANCE FLD: CLEAR
BASOPHILS # BLD AUTO: 0 10*3/MM3 (ref 0–0.2)
BASOPHILS NFR BLD AUTO: 0.7 % (ref 0–1.5)
COLOR FLD: NORMAL
DEPRECATED RDW RBC AUTO: 53.4 FL (ref 37–54)
EOSINOPHIL # BLD AUTO: 0.1 10*3/MM3 (ref 0–0.4)
EOSINOPHIL NFR BLD AUTO: 1.9 % (ref 0.3–6.2)
ERYTHROCYTE [DISTWIDTH] IN BLOOD BY AUTOMATED COUNT: 19.7 % (ref 12.3–15.4)
HCT VFR BLD AUTO: 32.2 % (ref 37.5–51)
HGB BLD-MCNC: 10.1 G/DL (ref 13–17.7)
INR PPP: 1.1 (ref 0.93–1.1)
LYMPHOCYTES # BLD AUTO: 0.7 10*3/MM3 (ref 0.7–3.1)
LYMPHOCYTES NFR BLD AUTO: 20.7 % (ref 19.6–45.3)
LYMPHOCYTES NFR FLD MANUAL: 23 %
MCH RBC QN AUTO: 23.9 PG (ref 26.6–33)
MCHC RBC AUTO-ENTMCNC: 31.3 G/DL (ref 31.5–35.7)
MCV RBC AUTO: 76.4 FL (ref 79–97)
MESOTHL CELL NFR FLD MANUAL: 11 %
MONOCYTES # BLD AUTO: 0.3 10*3/MM3 (ref 0.1–0.9)
MONOCYTES NFR BLD AUTO: 7.7 % (ref 5–12)
MONOCYTES NFR FLD: 63 %
NEUTROPHILS NFR BLD AUTO: 2.4 10*3/MM3 (ref 1.7–7)
NEUTROPHILS NFR BLD AUTO: 69 % (ref 42.7–76)
NEUTROPHILS NFR FLD MANUAL: 3 %
NRBC BLD AUTO-RTO: 0.1 /100 WBC (ref 0–0.2)
NUC CELL # FLD: 220 /MM3
PLATELET # BLD AUTO: 148 10*3/MM3 (ref 140–450)
PMV BLD AUTO: 7.6 FL (ref 6–12)
PROTHROMBIN TIME: 11.3 SECONDS (ref 9.6–11.7)
RBC # BLD AUTO: 4.21 10*6/MM3 (ref 4.14–5.8)
WBC NRBC COR # BLD: 3.5 10*3/MM3 (ref 3.4–10.8)

## 2022-07-26 PROCEDURE — 96366 THER/PROPH/DIAG IV INF ADDON: CPT

## 2022-07-26 PROCEDURE — 25010000002 ALBUMIN HUMAN 25% PER 50 ML: Performed by: INTERNAL MEDICINE

## 2022-07-26 PROCEDURE — 49083 ABD PARACENTESIS W/IMAGING: CPT | Performed by: INTERNAL MEDICINE

## 2022-07-26 PROCEDURE — 96365 THER/PROPH/DIAG IV INF INIT: CPT

## 2022-07-26 PROCEDURE — 87205 SMEAR GRAM STAIN: CPT | Performed by: INTERNAL MEDICINE

## 2022-07-26 PROCEDURE — P9047 ALBUMIN (HUMAN), 25%, 50ML: HCPCS | Performed by: INTERNAL MEDICINE

## 2022-07-26 PROCEDURE — 76942 ECHO GUIDE FOR BIOPSY: CPT

## 2022-07-26 PROCEDURE — 85025 COMPLETE CBC W/AUTO DIFF WBC: CPT | Performed by: INTERNAL MEDICINE

## 2022-07-26 PROCEDURE — 85610 PROTHROMBIN TIME: CPT | Performed by: INTERNAL MEDICINE

## 2022-07-26 PROCEDURE — 87070 CULTURE OTHR SPECIMN AEROBIC: CPT | Performed by: INTERNAL MEDICINE

## 2022-07-26 PROCEDURE — 89051 BODY FLUID CELL COUNT: CPT | Performed by: INTERNAL MEDICINE

## 2022-07-26 RX ORDER — ALBUMIN (HUMAN) 12.5 G/50ML
25 SOLUTION INTRAVENOUS DAILY PRN
Status: CANCELLED | OUTPATIENT
Start: 2022-07-26

## 2022-07-26 RX ORDER — ALBUMIN (HUMAN) 12.5 G/50ML
25 SOLUTION INTRAVENOUS ONCE
Status: COMPLETED | OUTPATIENT
Start: 2022-07-26 | End: 2022-07-26

## 2022-07-26 RX ORDER — ALBUMIN (HUMAN) 12.5 G/50ML
12.5 SOLUTION INTRAVENOUS DAILY PRN
Status: CANCELLED | OUTPATIENT
Start: 2022-07-26

## 2022-07-26 RX ADMIN — ALBUMIN (HUMAN) 25 G: 0.25 INJECTION, SOLUTION INTRAVENOUS at 14:05

## 2022-07-26 RX ADMIN — ALBUMIN (HUMAN) 25 G: 0.25 INJECTION, SOLUTION INTRAVENOUS at 14:50

## 2022-07-26 NOTE — PROGRESS NOTES
PARACENTESIS    Time Arrived in Department: 1145      Consent: yes  Allergies have been Reviewed: yes      ID Band Verified: yes    Method: Ambulatory    Lab Results: Checked and MD Notified     Physician: Dr. SHAWN Cantu      Nurse: Belkys Phelps RN    IR Care Plan: Person Educated - Patient, Current Knowledge - Understands, Learning Barrier - None, Readiness to Learn - Acceptance, Teaching Topic - Procedure and Evaluation of Teaching - Verbalized Understanding      Neurological: Within Normal Limits    Skin: Warm, Dry and Jaundice    Respiratory Status: Respirations even and unlabored    Room In: Ambulatory Department Room 8      Procedure: Paracentesis    Time Out Completed: yes    Time Out: 1315    Prep Site 1: Right Lateral Abdomen      Prep: Chlorhexidine and Sterile drape    Procedure Position: Right Side and Semi Fowlers    Guidance: Ultrasound    Fluid Quality: Cloudy and Yellow    Procedure Note: Procedure start at 1319. 2% lidocaine used. No complications noted.Specimens taken to lab.        Intra Time 1:1330    Intra Assess 1:   LOC: Alert  Pain:  No Issues and Tolerating  Skin: Warm, Dry and Jaundice  Respiratory Status:  Even and Unlabored  Intra Procedure Note 1:         Intra Time 2: 1400    Intra Assess 2:   LOC: Alert  Pain: No Issues and Tolerating  Skin: Warm, Dry and Jaundice  Respiratory Status: Even and Unlabored  Intra Procedure Note 2:         Intra Time 3: 1430    Intra Assess 3:   LOC: Alert  Pain: No Issues and Tolerating  Skin: Warm, Dry and Jaundice  Respiratory Status: Even and Unlabored  Intra Procedure Note 3:      Intra Time 4: 1500    Intra Assess 4:   LOC: Alert  Pain: No Issues and Tolerating  Skin: Warm, Dry and Jaundice  Respiratory Status: Even and Unlabored  Intra Procedure Note 4: para complete          Post-Procedure Position: Supine and HOB Elevated    Dressing Site 1: 2x2, 4x4 and Tegaderm    Post Procedure Status:  Alert and Oriented, returned to baseline, Dressing Dry/  "Intact, IV documented (see flowsheet), Stable Condition and Dressing properly labeled    Specimen To Lab: yes    Total Fluid Removed: 12.3 liters    Post Procedure Note:  Procedure complete at 1500. 50 grams of albumin given per MD order. No complications noted.   After completion of paracentesis patient sat on edge of bed for a couple of minutes. Patient stood at edge of bed and marched in place. Patient steady on his feet and stated he felt good. Patient denied any dizziness or lightheadedness. Patient escorted into hallway and was walking to bathroom. Patient had unassisted, unwitnessed fall. Four nurses present heard heavy footsteps but was not seen falling. Patient last seen less than 5 seconds prior. Patient pushed himself to seated position and two nurses assisted patient to a wheelchair. Patient had skin tears on left arm, hand, elbow, and wrist. Patient denied hitting his head. Patient stated that he \"tripped over his shoe.\" Patient continued to insist he was fine. Skin tears dressed. Vital signs taken and near his initial baseline vitals. Patient wheeled into bathroom and was supervised while using the bathroom. Patient steady on his feet, alert and oriented x4, denies and lightheadness or dizziness. Code 58 called. SAFE report and Significant Note completed with House Supervisor, christy Wright.          Report Given To:      Admit/Transfer To:      Transfer Time:      Discharge Time: 1615    Method: Ambulatory    O2 on Transfer:      Accompanied By:  None    Clothes Changed:  Self    Discharged Location:  Routine to Home    Discharge Teaching:  Care of Dressing and Follow up with MD              "

## 2022-07-26 NOTE — SIGNIFICANT NOTE
"Post Fall Note    Patient: Lei Mercado   Location: Room/bed info not found    Time of fall: 1549    Date of fall: 07/26/2022    Location of the fall: Ambulatory Services Hallway    Describe the Fall: Patient was in Ambulatory Dept for paracentesis. After completion of paracentesis patient sat on edge of bed for a couple of minutes. Patient stood at edge of bed and marched in place. Patient steady on his feet and stated he felt good. Patient denied any dizziness or lightheadedness. Patient escorted into hallway and was walking to bathroom. Patient had unassisted fall. Four nurses present heard heavy footsteps but was not seen falling. Patient pushed himself to seated position and two nurses assisted patient to a wheelchair. Patient had skin tears on left arm, hand, elbow, and wrist. Patient denied hitting his head. Patient stated that he \"tripped over his shoe.\" Patient continued to insist he was fine. Skin tears dressed. Vital signs taken and near his initial baseline vitals. Patient wheeled into bathroom and was supervised while using the bathroom. Patient steady on his feet, alert and oriented x4, denies and lightheadness or dizziness. Code 58 called.       Interventions in place prior to fall: Non-Skid Footwear and Adequate Lighting Provided    Was the fall witnessed? No    Where was the patient found?: [facility]    Patient position when found?: Pushing himself to seated    If witnessed, what part of the body made contact with the floor or other object? Not witnessed    Injury: Yes     Is the patient having any pain?: No    Level of Consciousness: awake, alert and oriented     Post fall assessment: N/A    Physician notified: No    Name of physician: N/A    Family notified: No    Name of family member notified: N/A    Post fall precautions in place: Patient/ Family Educated, Non-Skid Footwear and Patient Moved Closer to Nursing Station    Teaching provided: Yes          Electronically signed by Belkys Phelps RN, " 07/26/22, 16:16 EDT.

## 2022-07-28 ENCOUNTER — OFFICE VISIT (OUTPATIENT)
Dept: GASTROENTEROLOGY | Facility: CLINIC | Age: 67
End: 2022-07-28

## 2022-07-28 VITALS
SYSTOLIC BLOOD PRESSURE: 80 MMHG | HEART RATE: 90 BPM | HEIGHT: 74 IN | BODY MASS INDEX: 31.06 KG/M2 | WEIGHT: 242 LBS | TEMPERATURE: 96 F | DIASTOLIC BLOOD PRESSURE: 49 MMHG

## 2022-07-28 DIAGNOSIS — K74.60 CIRRHOSIS OF LIVER WITH ASCITES, UNSPECIFIED HEPATIC CIRRHOSIS TYPE: ICD-10-CM

## 2022-07-28 DIAGNOSIS — Z01.89 ENCOUNTER FOR OTHER SPECIFIED SPECIAL EXAMINATIONS: ICD-10-CM

## 2022-07-28 DIAGNOSIS — K75.81 NASH (NONALCOHOLIC STEATOHEPATITIS): Primary | ICD-10-CM

## 2022-07-28 DIAGNOSIS — R18.8 CIRRHOSIS OF LIVER WITH ASCITES, UNSPECIFIED HEPATIC CIRRHOSIS TYPE: ICD-10-CM

## 2022-07-28 PROCEDURE — 99213 OFFICE O/P EST LOW 20 MIN: CPT | Performed by: INTERNAL MEDICINE

## 2022-07-28 NOTE — PROGRESS NOTES
Chief Complaint   Patient presents with   • Cirrhosis       Lei Mercado is a  67 y.o. male here for a follow up visit for cirrhosis with ascites.    HPI     Patient 67-year-old male with history of hyperlipidemia and hypertension with cirrhosis related to Cyr.  Patient with diabetes and esophageal varices requiring paracentesis about every 2 weeks with removal up to 20 pounds of fluid with each sitting.  Patient here for follow-up and further recommendations.  Patient not responsive to diuretics.    Past Medical History:   Diagnosis Date   • Anemia 09/2011   • Anesthesia complication     AWAKENED EARLY, ASPIRATION PNEUMONIA   • Cancer (HCC)     KIDNEY   • Cirrhosis (HCC)    • Colon polyp    • Diabetes mellitus (HCC)    • Diverticulitis of colon    • Esophageal varices (HCC)    • Fatty liver    • Gallstones    • Hyperlipidemia    • Hypertension    • Liver disease          Current Outpatient Medications:   •  albuterol (PROVENTIL) (2.5 MG/3ML) 0.083% nebulizer solution, USE 1 VIAL IN NEBULIZER EVERY 4 TO 6 HOURS AS NEEDED FOR COPD/ASTHMA, Disp: , Rfl:   •  amLODIPine-benazepril (LOTREL) 10-40 MG per capsule,  Maintenance, 04/09/22 11:44:00 EDT, Disp: , Rfl:   •  esomeprazole (nexIUM) 40 MG capsule,  Maintenance, 04/09/22 11:44:00 EDT, Disp: , Rfl:   •  metFORMIN ER (GLUCOPHAGE-XR) 500 MG 24 hr tablet, Take 1,000 mg by mouth Daily., Disp: , Rfl:   •  montelukast (SINGULAIR) 10 MG tablet, Take 10 mg by mouth every night at bedtime., Disp: , Rfl:   •  pioglitazone (ACTOS) 15 MG tablet, , Disp: , Rfl:   •  rosuvastatin (CRESTOR) 10 MG tablet, , Disp: , Rfl:   •  sertraline (ZOLOFT) 100 MG tablet, , Disp: , Rfl:   •  spironolactone (Aldactone) 100 MG tablet, Take 1 tablet by mouth Daily., Disp: 90 tablet, Rfl: 3  •  terazosin (HYTRIN) 10 MG capsule,  Maintenance, 04/09/22 11:45:00 EDT, Disp: , Rfl:   •  Vitamin E 400 units tablet, Take 800 Units by mouth Daily., Disp: 60 each, Rfl: 11  •  albuterol sulfate  (90  Base) MCG/ACT inhaler, INHALE 2 PUFFS BY MOUTH EVERY 6 HOURS AS NEEDED FOR WHEEZING FOR SHORTNESS OF BREATH, Disp: , Rfl:   No current facility-administered medications for this visit.    No Known Allergies    Social History     Socioeconomic History   • Marital status: Single   Tobacco Use   • Smoking status: Former Smoker     Packs/day: 1.00     Years: 15.00     Pack years: 15.00     Types: Cigarettes     Start date: 1971     Quit date: 1984     Years since quittin.2   • Smokeless tobacco: Never Used   • Tobacco comment: quit 20 plus years ago    Substance and Sexual Activity   • Alcohol use: Not Currently   • Drug use: Not Currently     Types: Marijuana   • Sexual activity: Not Currently     Partners: Female       Family History   Problem Relation Age of Onset   • Cirrhosis Mother    • Liver disease Sister    • Malig Hyperthermia Neg Hx        Review of Systems   Constitutional: Negative.    Respiratory: Negative.    Cardiovascular: Negative.    Gastrointestinal: Negative.    Musculoskeletal: Negative.    Skin: Negative.    Hematological: Negative.        Vitals:    22 1341   BP: (!) 80/49   Pulse: 90   Temp: 96 °F (35.6 °C)       Physical Exam  Vitals and nursing note reviewed.   Constitutional:       Appearance: Normal appearance. He is well-developed. He is obese.   HENT:      Head: Normocephalic and atraumatic.   Eyes:      General: No scleral icterus.     Conjunctiva/sclera: Conjunctivae normal.   Neck:      Trachea: No tracheal deviation.   Cardiovascular:      Rate and Rhythm: Normal rate and regular rhythm.   Pulmonary:      Effort: Pulmonary effort is normal. No respiratory distress.      Breath sounds: Normal breath sounds. No wheezing.   Abdominal:      General: Bowel sounds are normal. There is no distension.      Palpations: Abdomen is soft. There is no mass.      Tenderness: There is no abdominal tenderness. There is no guarding or rebound.   Musculoskeletal:         General: No  swelling or tenderness. Normal range of motion.      Cervical back: Normal range of motion.   Skin:     General: Skin is warm and dry.      Coloration: Skin is not jaundiced.      Findings: No rash.   Neurological:      General: No focal deficit present.      Mental Status: He is alert and oriented to person, place, and time.      Cranial Nerves: No cranial nerve deficit.   Psychiatric:         Behavior: Behavior normal.         Thought Content: Thought content normal.         Judgment: Judgment normal.         Hospital Outpatient Visit on 07/26/2022   Component Date Value Ref Range Status   • Protime 07/26/2022 11.3  9.6 - 11.7 Seconds Final   • INR 07/26/2022 1.10  0.93 - 1.10 Final   • WBC 07/26/2022 3.50  3.40 - 10.80 10*3/mm3 Final   • RBC 07/26/2022 4.21  4.14 - 5.80 10*6/mm3 Final   • Hemoglobin 07/26/2022 10.1 (A) 13.0 - 17.7 g/dL Final   • Hematocrit 07/26/2022 32.2 (A) 37.5 - 51.0 % Final   • MCV 07/26/2022 76.4 (A) 79.0 - 97.0 fL Final   • MCH 07/26/2022 23.9 (A) 26.6 - 33.0 pg Final   • MCHC 07/26/2022 31.3 (A) 31.5 - 35.7 g/dL Final   • RDW 07/26/2022 19.7 (A) 12.3 - 15.4 % Final   • RDW-SD 07/26/2022 53.4  37.0 - 54.0 fl Final   • MPV 07/26/2022 7.6  6.0 - 12.0 fL Final   • Platelets 07/26/2022 148  140 - 450 10*3/mm3 Final   • Neutrophil % 07/26/2022 69.0  42.7 - 76.0 % Final   • Lymphocyte % 07/26/2022 20.7  19.6 - 45.3 % Final   • Monocyte % 07/26/2022 7.7  5.0 - 12.0 % Final   • Eosinophil % 07/26/2022 1.9  0.3 - 6.2 % Final   • Basophil % 07/26/2022 0.7  0.0 - 1.5 % Final   • Neutrophils, Absolute 07/26/2022 2.40  1.70 - 7.00 10*3/mm3 Final   • Lymphocytes, Absolute 07/26/2022 0.70  0.70 - 3.10 10*3/mm3 Final   • Monocytes, Absolute 07/26/2022 0.30  0.10 - 0.90 10*3/mm3 Final   • Eosinophils, Absolute 07/26/2022 0.10  0.00 - 0.40 10*3/mm3 Final   • Basophils, Absolute 07/26/2022 0.00  0.00 - 0.20 10*3/mm3 Final   • nRBC 07/26/2022 0.1  0.0 - 0.2 /100 WBC Final   • Body Fluid Culture 07/26/2022 No  growth at 2 days   Preliminary   • Gram Stain 07/26/2022 Rare (1+) WBCs seen   Preliminary   • Gram Stain 07/26/2022 No organisms seen   Preliminary   • Color, Fluid 07/26/2022 Light Yellow   Final   • Appearance, Fluid 07/26/2022 Clear  Clear Final   • Nucleated Cells, Fluid 07/26/2022 220  /mm3 Final   • Neutrophils, Fluid 07/26/2022 3  % Final   • Lymphocytes, Fluid 07/26/2022 23  % Final   • Monocytes, Fluid 07/26/2022 63  % Final   • Mesothelial Cells, Fluid 07/26/2022 11  % Final   Hospital Outpatient Visit on 07/11/2022   Component Date Value Ref Range Status   • Albumin, Fluid 07/11/2022 1.70  g/dL Final   • Body Fluid Culture 07/11/2022 No growth at 5 days   Final   • Gram Stain 07/11/2022 Few (2+) WBCs per low power field   Final   • Gram Stain 07/11/2022 No organisms seen   Final   • Color, Fluid 07/11/2022 Yellow   Final   • Appearance, Fluid 07/11/2022 Clear  Clear Final   • Nucleated Cells, Fluid 07/11/2022 332  /mm3 Final   • Method: 07/11/2022 Automated DXH Analyzer   Final   • Neutrophils, Fluid 07/11/2022 4  % Final   • Lymphocytes, Fluid 07/11/2022 52  % Final   • Unclassified Cells, Fluid 07/11/2022    Corrected   • Mesothelial Cells, Fluid 07/11/2022 43  % Final   • Macrophage, Fluid 07/11/2022 1  % Final   Hospital Outpatient Visit on 06/24/2022   Component Date Value Ref Range Status   • Albumin, Fluid 06/24/2022 1.80  g/dL Final   • Glucose 06/24/2022 182 (A) 65 - 99 mg/dL Final   • BUN 06/24/2022 26 (A) 8 - 23 mg/dL Final   • Creatinine 06/24/2022 1.18  0.76 - 1.27 mg/dL Final   • Sodium 06/24/2022 138  136 - 145 mmol/L Final   • Potassium 06/24/2022 4.8  3.5 - 5.2 mmol/L Final   • Chloride 06/24/2022 108 (A) 98 - 107 mmol/L Final   • CO2 06/24/2022 21.0 (A) 22.0 - 29.0 mmol/L Final   • Calcium 06/24/2022 8.7  8.6 - 10.5 mg/dL Final   • Total Protein 06/24/2022 6.6  6.0 - 8.5 g/dL Final   • Albumin 06/24/2022 3.40 (A) 3.50 - 5.20 g/dL Final   • ALT (SGPT) 06/24/2022 10  1 - 41 U/L Final   •  AST (SGOT) 06/24/2022 14  1 - 40 U/L Final   • Alkaline Phosphatase 06/24/2022 118 (A) 39 - 117 U/L Final   • Total Bilirubin 06/24/2022 0.2  0.0 - 1.2 mg/dL Final   • Globulin 06/24/2022 3.2  gm/dL Final   • A/G Ratio 06/24/2022 1.1  g/dL Final   • BUN/Creatinine Ratio 06/24/2022 22.0  7.0 - 25.0 Final   • Anion Gap 06/24/2022 9.0  5.0 - 15.0 mmol/L Final   • eGFR 06/24/2022 68.1  >60.0 mL/min/1.73 Final   • WBC 06/24/2022 2.00 (A) 3.40 - 10.80 10*3/mm3 Final   • RBC 06/24/2022 3.79 (A) 4.14 - 5.80 10*6/mm3 Final   • Hemoglobin 06/24/2022 9.0 (A) 13.0 - 17.7 g/dL Final   • Hematocrit 06/24/2022 28.9 (A) 37.5 - 51.0 % Final   • MCV 06/24/2022 76.2 (A) 79.0 - 97.0 fL Final   • MCH 06/24/2022 23.8 (A) 26.6 - 33.0 pg Final   • MCHC 06/24/2022 31.2 (A) 31.5 - 35.7 g/dL Final   • RDW 06/24/2022 17.8 (A) 12.3 - 15.4 % Final   • RDW-SD 06/24/2022 48.6  37.0 - 54.0 fl Final   • MPV 06/24/2022 7.9  6.0 - 12.0 fL Final   • Platelets 06/24/2022 101 (A) 140 - 450 10*3/mm3 Final   • Protime 06/24/2022 11.6  9.6 - 11.7 Seconds Final   • INR 06/24/2022 1.13 (A) 0.93 - 1.10 Final   • Color, Fluid 06/24/2022 Yellow   Final   • Appearance, Fluid 06/24/2022 Clear  Clear Final   • Nucleated Cells, Fluid 06/24/2022 281  /mm3 Final   • Method: 06/24/2022 Automated DXH Analyzer   Final   • Neutrophils, Fluid 06/24/2022 4  % Final   • Lymphocytes, Fluid 06/24/2022 77  % Final   • Mesothelial Cells, Fluid 06/24/2022 19  % Final   Hospital Outpatient Visit on 06/06/2022   Component Date Value Ref Range Status   • Protime 06/06/2022 12.2 (A) 9.6 - 11.7 Seconds Final   • INR 06/06/2022 1.20 (A) 0.93 - 1.10 Final   • WBC 06/06/2022 2.70 (A) 3.40 - 10.80 10*3/mm3 Final   • RBC 06/06/2022 3.97 (A) 4.14 - 5.80 10*6/mm3 Final   • Hemoglobin 06/06/2022 9.5 (A) 13.0 - 17.7 g/dL Final   • Hematocrit 06/06/2022 30.4 (A) 37.5 - 51.0 % Final   • MCV 06/06/2022 76.6 (A) 79.0 - 97.0 fL Final   • MCH 06/06/2022 23.8 (A) 26.6 - 33.0 pg Final   • MCHC  06/06/2022 31.1 (A) 31.5 - 35.7 g/dL Final   • RDW 06/06/2022 17.8 (A) 12.3 - 15.4 % Final   • RDW-SD 06/06/2022 48.1  37.0 - 54.0 fl Final   • MPV 06/06/2022 7.8  6.0 - 12.0 fL Final   • Platelets 06/06/2022 121 (A) 140 - 450 10*3/mm3 Final   • Neutrophil % 06/06/2022 72.1  42.7 - 76.0 % Final   • Lymphocyte % 06/06/2022 16.8 (A) 19.6 - 45.3 % Final   • Monocyte % 06/06/2022 8.8  5.0 - 12.0 % Final   • Eosinophil % 06/06/2022 1.6  0.3 - 6.2 % Final   • Basophil % 06/06/2022 0.7  0.0 - 1.5 % Final   • Neutrophils, Absolute 06/06/2022 1.90  1.70 - 7.00 10*3/mm3 Final   • Lymphocytes, Absolute 06/06/2022 0.50 (A) 0.70 - 3.10 10*3/mm3 Final   • Monocytes, Absolute 06/06/2022 0.20  0.10 - 0.90 10*3/mm3 Final   • Eosinophils, Absolute 06/06/2022 0.00  0.00 - 0.40 10*3/mm3 Final   • Basophils, Absolute 06/06/2022 0.00  0.00 - 0.20 10*3/mm3 Final   • nRBC 06/06/2022 0.1  0.0 - 0.2 /100 WBC Final   • Body Fluid Culture 06/06/2022 No growth at 5 days   Final   • Gram Stain 06/06/2022 Rare (1+) WBCs seen   Final   • Gram Stain 06/06/2022 No organisms seen   Final   • Color, Fluid 06/06/2022 Yellow   Final   • Appearance, Fluid 06/06/2022 Clear  Clear Final   • Nucleated Cells, Fluid 06/06/2022 341  /mm3 Final   • Method: 06/06/2022 Automated DXH Analyzer   Final   • Albumin, Fluid 06/06/2022 2.20  g/dL Final   • Neutrophils, Fluid 06/06/2022 1  % Final   • Lymphocytes, Fluid 06/06/2022 62  % Final   • Monocytes, Fluid 06/06/2022 16  % Final   • Mesothelial Cells, Fluid 06/06/2022 21  % Final       Diagnoses and all orders for this visit:    1. PERALES (nonalcoholic steatohepatitis) (Primary)  -     CT Abdomen With Contrast; Future  -     Adult Transthoracic Echo Complete W/ Cont if Necessary Per Protocol; Future  -     IR Transjugular Intrahepatic Portosystemic Shunt; Future  -     Obtain Informed Consent; Standing  -     CT Cyst Aspiration Liver; Future  -     No Lab Testing Needed; Standing  -     Obtain Informed Consent  -      No Lab Testing Needed    2. Cirrhosis of liver with ascites, unspecified hepatic cirrhosis type (HCC)  -     CT Abdomen With Contrast; Future  -     Adult Transthoracic Echo Complete W/ Cont if Necessary Per Protocol; Future  -     IR Transjugular Intrahepatic Portosystemic Shunt; Future  -     Obtain Informed Consent; Standing  -     CT Cyst Aspiration Liver; Future  -     No Lab Testing Needed; Standing  -     Obtain Informed Consent  -     No Lab Testing Needed    3. Encounter for other specified special examinations   -     Adult Transthoracic Echo Complete W/ Cont if Necessary Per Protocol; Future      Patient 67-year-old male with history of Cyr with cirrhosis and ascites that is intractable.  Patient not responding to diuretics requiring paracentesis every 1 to 2 weeks.  Patient reports paracentesis removal of over 20 pounds of fluid in each setting despite diuretic treatment.  We will begin with paracentesis as needed and arrange TIPS procedure prior to referral for transplant evaluation.  We will follow-up clinically.

## 2022-07-31 LAB
BACTERIA FLD CULT: NORMAL
GRAM STN SPEC: NORMAL
GRAM STN SPEC: NORMAL

## 2022-08-01 ENCOUNTER — TELEPHONE (OUTPATIENT)
Dept: GASTROENTEROLOGY | Facility: CLINIC | Age: 67
End: 2022-08-01

## 2022-08-01 NOTE — TELEPHONE ENCOUNTER
Caller: Lei Mercado    Relationship to patient: Self    Best call back number: [phone]    Patient is needing: ORDERS HAVE MAGED PUT IN FOR 7/28/22 FROM [facility] AND PATIENT IS TRYING TO GET THESE SCHEDULE; PLEASE CALL PATIENT.

## 2022-08-02 ENCOUNTER — TELEPHONE (OUTPATIENT)
Dept: GASTROENTEROLOGY | Facility: CLINIC | Age: 67
End: 2022-08-02

## 2022-08-02 NOTE — TELEPHONE ENCOUNTER
----- Message from Aydee Sanon sent at 8/2/2022 12:57 PM EDT -----  Contact: [phone]  HE CALLED AND WANTS HIS TIBS SCHEDULED  CALL HIM BACK AND ADVISE

## 2022-08-02 NOTE — TELEPHONE ENCOUNTER
Called pt and advised he should get a call from [facility] to schedule his TIPS procedure.  Pt states he has tried to call and cannot get thru.  Called Dianne Bond in IR scheduling and left a msg for her to call pt.  Advised to call back if any questions.

## 2022-08-03 ENCOUNTER — TELEPHONE (OUTPATIENT)
Dept: GASTROENTEROLOGY | Facility: CLINIC | Age: 67
End: 2022-08-03

## 2022-08-03 NOTE — TELEPHONE ENCOUNTER
Per Dr. Almeida:     Procedure done for volume of ascites not for 2 weeks.  If patient getting short of breath we can reorder but if patient's symptoms are controlled no need to repeat

## 2022-08-03 NOTE — TELEPHONE ENCOUNTER
Caller: Lei Mercado    Relationship to patient: Self    Best call back number: [phone]     Patient is needing: PT MISSED NURSE CALL AND IS REQUESTING CALL BACK REGARDING PARASENTESIS

## 2022-08-03 NOTE — TELEPHONE ENCOUNTER
Requesting an order for a paracentesis. He said it will be 2 weeks since his last paracentesis on 8/6/22.   Update to Dr. Almeida.     He also states he has not heard from the scheduling department. Advised he will hear from [facility] central scheduling. They have his referral and will call him.     He verb understanding.   Update to Dr. Almeida regarding the patient's request for a paracentesis.

## 2022-08-05 NOTE — TELEPHONE ENCOUNTER
This patient's symptomatic?  Lets have Sheila do the ordering since she seems to know when it is best for the patient to have the procedure done.  Apparently Sheila knows how frequently patient needs to get this done since its been so long.

## 2022-08-08 ENCOUNTER — TELEPHONE (OUTPATIENT)
Dept: GASTROENTEROLOGY | Facility: CLINIC | Age: 67
End: 2022-08-08

## 2022-08-08 NOTE — TELEPHONE ENCOUNTER
Caller: Lei Mercado    Relationship: Self    Best call back number: [phone]    Who are you requesting to speak with (clinical staff, provider,  specific staff member): CLINICAL STAFF    What was the call regarding: PT REQUESTING CALL BACK URGENT TO SCHEDULE PARASENTESIS    Do you require a callback: YES

## 2022-08-08 NOTE — TELEPHONE ENCOUNTER
----- Message from David Almeida MD sent at 8/8/2022 10:26 AM EDT -----  Hi Dr Almeida, this is Sheila from [facility]. I am currently working on Mr Mercado and need to speak with your nurse. I can't get through to your office, can you give me a back number that I can call?    Just got this from interventional radiology at [place].  If someone can get in touch with them over there.

## 2022-08-08 NOTE — TELEPHONE ENCOUNTER
Called Sheila in IR at [place]. She states pt has an active order for a paracentesis.  Pt is scheduled for 8/11.     She is requesting a doppler study that pt states was done at [facility]. Needs it on a disk with report. Called [facility], pt only had an US of gall bladder done there in 2021.  Ordered disk and report to be mailed to Malvin Baptiste, attn: IR.    Also, she is also asking if the pt needs the CT liver aspiration done or was this order placed in error.  No records of liver cyst.  Msg sent to Dr Almeida to clarify.

## 2022-08-09 ENCOUNTER — HOSPITAL ENCOUNTER (OUTPATIENT)
Dept: INFUSION THERAPY | Facility: HOSPITAL | Age: 67
Discharge: HOME | End: 2022-08-09
Admitting: INTERNAL MEDICINE

## 2022-08-09 VITALS
RESPIRATION RATE: 20 BRPM | HEART RATE: 77 BPM | DIASTOLIC BLOOD PRESSURE: 48 MMHG | OXYGEN SATURATION: 97 % | SYSTOLIC BLOOD PRESSURE: 122 MMHG

## 2022-08-09 DIAGNOSIS — K75.81 NASH (NONALCOHOLIC STEATOHEPATITIS): ICD-10-CM

## 2022-08-09 DIAGNOSIS — R18.8 CIRRHOSIS OF LIVER WITH ASCITES, UNSPECIFIED HEPATIC CIRRHOSIS TYPE: ICD-10-CM

## 2022-08-09 DIAGNOSIS — K74.60 CIRRHOSIS OF LIVER WITH ASCITES, UNSPECIFIED HEPATIC CIRRHOSIS TYPE: Primary | ICD-10-CM

## 2022-08-09 DIAGNOSIS — K74.60 CIRRHOSIS OF LIVER WITH ASCITES, UNSPECIFIED HEPATIC CIRRHOSIS TYPE: ICD-10-CM

## 2022-08-09 DIAGNOSIS — R18.8 CIRRHOSIS OF LIVER WITH ASCITES, UNSPECIFIED HEPATIC CIRRHOSIS TYPE: Primary | ICD-10-CM

## 2022-08-09 LAB
APPEARANCE FLD: ABNORMAL
COLOR FLD: YELLOW
DEPRECATED RDW RBC AUTO: 56.9 FL (ref 37–54)
ERYTHROCYTE [DISTWIDTH] IN BLOOD BY AUTOMATED COUNT: 20.5 % (ref 12.3–15.4)
HCT VFR BLD AUTO: 29.1 % (ref 37.5–51)
HGB BLD-MCNC: 9.2 G/DL (ref 13–17.7)
INR PPP: 1.11 (ref 0.93–1.1)
LYMPHOCYTES NFR FLD MANUAL: 42 %
MACROPHAGE FLUID: 1 %
MCH RBC QN AUTO: 24.6 PG (ref 26.6–33)
MCHC RBC AUTO-ENTMCNC: 31.4 G/DL (ref 31.5–35.7)
MCV RBC AUTO: 78.2 FL (ref 79–97)
MESOTHL CELL NFR FLD MANUAL: 57 %
METHOD: ABNORMAL
NUC CELL # FLD: 224 /MM3
PLATELET # BLD AUTO: 114 10*3/MM3 (ref 140–450)
PMV BLD AUTO: 7.3 FL (ref 6–12)
PROTHROMBIN TIME: 11.4 SECONDS (ref 9.6–11.7)
RBC # BLD AUTO: 3.73 10*6/MM3 (ref 4.14–5.8)
WBC NRBC COR # BLD: 2.5 10*3/MM3 (ref 3.4–10.8)

## 2022-08-09 PROCEDURE — 85610 PROTHROMBIN TIME: CPT | Performed by: INTERNAL MEDICINE

## 2022-08-09 PROCEDURE — 25010000002 ALBUMIN HUMAN 25% PER 50 ML: Performed by: INTERNAL MEDICINE

## 2022-08-09 PROCEDURE — 96365 THER/PROPH/DIAG IV INF INIT: CPT

## 2022-08-09 PROCEDURE — 49083 ABD PARACENTESIS W/IMAGING: CPT | Performed by: INTERNAL MEDICINE

## 2022-08-09 PROCEDURE — 96366 THER/PROPH/DIAG IV INF ADDON: CPT

## 2022-08-09 PROCEDURE — 85027 COMPLETE CBC AUTOMATED: CPT | Performed by: INTERNAL MEDICINE

## 2022-08-09 PROCEDURE — P9047 ALBUMIN (HUMAN), 25%, 50ML: HCPCS | Performed by: INTERNAL MEDICINE

## 2022-08-09 PROCEDURE — 89051 BODY FLUID CELL COUNT: CPT | Performed by: INTERNAL MEDICINE

## 2022-08-09 PROCEDURE — 76942 ECHO GUIDE FOR BIOPSY: CPT

## 2022-08-09 PROCEDURE — 36415 COLL VENOUS BLD VENIPUNCTURE: CPT | Performed by: INTERNAL MEDICINE

## 2022-08-09 RX ORDER — ALBUMIN (HUMAN) 12.5 G/50ML
25 SOLUTION INTRAVENOUS ONCE
Status: COMPLETED | OUTPATIENT
Start: 2022-08-09 | End: 2022-08-09

## 2022-08-09 RX ORDER — ALBUMIN (HUMAN) 12.5 G/50ML
25 SOLUTION INTRAVENOUS ONCE
Status: CANCELLED | OUTPATIENT
Start: 2022-08-09

## 2022-08-09 RX ADMIN — ALBUMIN (HUMAN) 25 G: 0.25 INJECTION, SOLUTION INTRAVENOUS at 10:41

## 2022-08-09 RX ADMIN — ALBUMIN (HUMAN) 25 G: 0.25 INJECTION, SOLUTION INTRAVENOUS at 09:31

## 2022-08-09 RX ADMIN — ALBUMIN (HUMAN) 12.5 G: 0.25 INJECTION, SOLUTION INTRAVENOUS at 11:17

## 2022-08-09 RX ADMIN — ALBUMIN (HUMAN) 25 G: 0.25 INJECTION, SOLUTION INTRAVENOUS at 10:02

## 2022-08-09 NOTE — PROGRESS NOTES
PARACENTESIS    Time Arrived in Department: 0820      Consent: yes  Allergies have been Reviewed: yes      ID Band Verified: yes    Method: Ambulatory    Lab Results: Checked and MD Notified     Physician: Dr. Cantu      Nurse: Aaliyah Menendez RN    IR Care Plan: Person Educated - Patient, Current Knowledge - Understands, Learning Barrier - None, Readiness to Learn - Acceptance, Teaching Topic - Procedure and Evaluation of Teaching - Verbalized Understanding      Neurological: Within Normal Limits    Skin: Flesh, Warm and Dry    Respiratory Status: Respirations even and unlabored    Room In: 8      Procedure: Paracentesis    Time Out Completed: yes    Time Out: 0905    Prep Time: 0907    Prep Site 1: Right Lateral Abdomen      Prep: Chlorhexidine and Sterile drape    Procedure Position: Right Side    Guidance: Ultrasound    Fluid Quality: [place] Yellow    Procedure Note: Paracentesis began at 0910        Intra Time 1:0930    Intra Assess 1:   LOC: Alert  Pain:  No Issues  Skin: Flesh, Warm and Dry  Respiratory Status:  Even and Unlabored  Intra Procedure Note 1:         Intra Time 2: 1000    Intra Assess 2:   LOC: Alert  Pain: No Issues  Skin: Flesh, Warm and Dry  Respiratory Status: Even and Unlabored  Intra Procedure Note 2:         Intra Time 3: 1045    Intra Assess 3:   LOC: Alert  Pain: No Issues  Skin: Flesh, Warm and Dry  Respiratory Status: Even and Unlabored  Intra Procedure Note 3:para completed                Post-Procedure Position: HOB Elevated and Right side down; patient did not want wedge to be removed    Dressing Site 1: 2x2, 4x4 and Tegaderm    Post Procedure Status:  Alert and Oriented, returned to baseline, Dressing Dry/ Intact, IV documented (see flowsheet), Stable Condition and Dressing properly labeled    Specimen To Lab: yes    Total Fluid Removed: 12.7 liters    Post Procedure Note:  Patient tolerated procedure well        Report Given To: n/a    Admit/Transfer To: n/a    Transfer Time:  n/a    Discharge Time: 11:50    Method: Wheelchair    O2 on Transfer: no    Accompanied By:  None    Clothes Changed:  Self    Discharged Location:  Routine to Home    Discharge Teaching:  Care of Dressing, Home Care Instructions Sheet Given and PAtient

## 2022-08-11 ENCOUNTER — APPOINTMENT (OUTPATIENT)
Dept: INFUSION THERAPY | Facility: HOSPITAL | Age: 67
End: 2022-08-11

## 2022-08-11 ENCOUNTER — HOSPITAL ENCOUNTER (OUTPATIENT)
Dept: CARDIOLOGY | Facility: HOSPITAL | Age: 67
Discharge: HOME | End: 2022-08-11

## 2022-08-11 ENCOUNTER — HOSPITAL ENCOUNTER (OUTPATIENT)
Dept: CT IMAGING | Facility: HOSPITAL | Age: 67
Discharge: HOME | End: 2022-08-11

## 2022-08-11 VITALS
DIASTOLIC BLOOD PRESSURE: 45 MMHG | WEIGHT: 242.51 LBS | SYSTOLIC BLOOD PRESSURE: 113 MMHG | HEIGHT: 74 IN | BODY MASS INDEX: 31.12 KG/M2

## 2022-08-11 DIAGNOSIS — K74.60 CIRRHOSIS OF LIVER WITH ASCITES, UNSPECIFIED HEPATIC CIRRHOSIS TYPE: ICD-10-CM

## 2022-08-11 DIAGNOSIS — Z01.89 ENCOUNTER FOR OTHER SPECIFIED SPECIAL EXAMINATIONS: ICD-10-CM

## 2022-08-11 DIAGNOSIS — K75.81 NASH (NONALCOHOLIC STEATOHEPATITIS): ICD-10-CM

## 2022-08-11 DIAGNOSIS — R18.8 CIRRHOSIS OF LIVER WITH ASCITES, UNSPECIFIED HEPATIC CIRRHOSIS TYPE: ICD-10-CM

## 2022-08-11 LAB
CREAT BLDA-MCNC: 1.4 MG/DL (ref 0.6–1.3)
EGFRCR SERPLBLD CKD-EPI 2021: 55.1 ML/MIN/1.73

## 2022-08-11 PROCEDURE — 93306 TTE W/DOPPLER COMPLETE: CPT

## 2022-08-11 PROCEDURE — 74160 CT ABDOMEN W/CONTRAST: CPT

## 2022-08-11 PROCEDURE — 82565 ASSAY OF CREATININE: CPT

## 2022-08-11 PROCEDURE — 0 IOPAMIDOL PER 1 ML: Performed by: INTERNAL MEDICINE

## 2022-08-11 PROCEDURE — 93306 TTE W/DOPPLER COMPLETE: CPT | Performed by: INTERNAL MEDICINE

## 2022-08-11 RX ADMIN — IOPAMIDOL 100 ML: 755 INJECTION, SOLUTION INTRAVENOUS at 16:20

## 2022-08-16 ENCOUNTER — TELEPHONE (OUTPATIENT)
Dept: GASTROENTEROLOGY | Facility: CLINIC | Age: 67
End: 2022-08-16

## 2022-08-16 NOTE — TELEPHONE ENCOUNTER
Sheila from Interventional [facility]iology requesting to speak with Dr. Almeida or MA.     Call back # [phone] - she will be in the office until 3 pm today

## 2022-08-16 NOTE — TELEPHONE ENCOUNTER
Called Sheila in IR at [facility].  She states the radiologist at [facility] would like for a GI MD that covers [facility] to follow pt post TIPS procedure.  She would like for Dr Almeida to let them know which MD pt should follow with.     Msg sent to Dr Almeida.

## 2022-08-17 LAB
BH CV ECHO MEAS - ACS: 1.33 CM
BH CV ECHO MEAS - AO MAX PG: 27.5 MMHG
BH CV ECHO MEAS - AO MEAN PG: 11.5 MMHG
BH CV ECHO MEAS - AO ROOT DIAM: 3.4 CM
BH CV ECHO MEAS - AO V2 MAX: 262.4 CM/SEC
BH CV ECHO MEAS - AO V2 VTI: 47.9 CM
BH CV ECHO MEAS - AVA(I,D): 2.31 CM2
BH CV ECHO MEAS - EDV(CUBED): 175.8 ML
BH CV ECHO MEAS - EDV(MOD-SP4): 56.2 ML
BH CV ECHO MEAS - EF(MOD-BP): 65 %
BH CV ECHO MEAS - EF(MOD-SP4): 60.6 %
BH CV ECHO MEAS - ESV(CUBED): 39.2 ML
BH CV ECHO MEAS - ESV(MOD-SP4): 22.2 ML
BH CV ECHO MEAS - FS: 39.4 %
BH CV ECHO MEAS - IVS/LVPW: 1.05 CM
BH CV ECHO MEAS - IVSD: 1.07 CM
BH CV ECHO MEAS - LA DIMENSION: 4.5 CM
BH CV ECHO MEAS - LV DIASTOLIC VOL/BSA (35-75): 23.8 CM2
BH CV ECHO MEAS - LV MASS(C)D: 233.3 GRAMS
BH CV ECHO MEAS - LV MAX PG: 13.2 MMHG
BH CV ECHO MEAS - LV MEAN PG: 6 MMHG
BH CV ECHO MEAS - LV SYSTOLIC VOL/BSA (12-30): 9.4 CM2
BH CV ECHO MEAS - LV V1 MAX: 181.9 CM/SEC
BH CV ECHO MEAS - LV V1 VTI: 38.7 CM
BH CV ECHO MEAS - LVIDD: 5.6 CM
BH CV ECHO MEAS - LVIDS: 3.4 CM
BH CV ECHO MEAS - LVOT AREA: 2.9 CM2
BH CV ECHO MEAS - LVOT DIAM: 1.91 CM
BH CV ECHO MEAS - LVPWD: 1.02 CM
BH CV ECHO MEAS - MR MAX PG: 57.4 MMHG
BH CV ECHO MEAS - MR MAX VEL: 378.8 CM/SEC
BH CV ECHO MEAS - MV A MAX VEL: 125.6 CM/SEC
BH CV ECHO MEAS - MV DEC SLOPE: 420.4 CM/SEC2
BH CV ECHO MEAS - MV DEC TIME: 0.27 MSEC
BH CV ECHO MEAS - MV E MAX VEL: 112.2 CM/SEC
BH CV ECHO MEAS - MV E/A: 0.89
BH CV ECHO MEAS - MV MAX PG: 6 MMHG
BH CV ECHO MEAS - MV MEAN PG: 2.21 MMHG
BH CV ECHO MEAS - MV V2 VTI: 39.3 CM
BH CV ECHO MEAS - MVA(VTI): 2.8 CM2
BH CV ECHO MEAS - PA ACC TIME: 0.12 SEC
BH CV ECHO MEAS - PA PR(ACCEL): 25.1 MMHG
BH CV ECHO MEAS - PA V2 MAX: 185.8 CM/SEC
BH CV ECHO MEAS - PI END-D VEL: 133.9 CM/SEC
BH CV ECHO MEAS - PULM A REVS DUR: 0.1 SEC
BH CV ECHO MEAS - PULM A REVS VEL: 39.3 CM/SEC
BH CV ECHO MEAS - PULM DIAS VEL: 71.7 CM/SEC
BH CV ECHO MEAS - PULM S/D: 0.9
BH CV ECHO MEAS - PULM SYS VEL: 64.3 CM/SEC
BH CV ECHO MEAS - RAP SYSTOLE: 10 MMHG
BH CV ECHO MEAS - RVSP: 53.3 MMHG
BH CV ECHO MEAS - SI(MOD-SP4): 14.4 ML/M2
BH CV ECHO MEAS - SV(LVOT): 110.8 ML
BH CV ECHO MEAS - SV(MOD-SP4): 34 ML
BH CV ECHO MEAS - TAPSE (>1.6): 2.2 CM
BH CV ECHO MEAS - TR MAX PG: 43.3 MMHG
BH CV ECHO MEAS - TR MAX VEL: 328.3 CM/SEC
BH CV XLRA - RV BASE: 3.5 CM
BH CV XLRA - RV MID: 2.3 CM
MAXIMAL PREDICTED HEART RATE: 153 BPM
STRESS TARGET HR: 130 BPM

## 2022-08-18 NOTE — TELEPHONE ENCOUNTER
Sheila called at [facility](interventional ). No answer. Left message. Advised as per Dr. Almeida's note. Advised if she has any questions to all back.

## 2022-08-18 NOTE — TELEPHONE ENCOUNTER
Caller: MICHAEL MORE     Relationship: SELF    Best call back number: [phone]    What is the best time to reach you: ANYTIME    Who are you requesting to speak with (clinical staff, provider,  specific staff member): CLINICAL STAFF    What was the call regarding: PT IS REQUESTING THAT SOMEONE GIVE LEXA IN IR AT [facility] A CALL. PT STATED THAT SHE SAID SHE HAS NOT BEEN ABLE TO TALK TO ANYONE ABOUT THIS ISSUE YET, THEREFORE PT CANNOT GET SCHEDULED. PT IS GETTING FRUSTRATED AND WANTS TO GET SCHEDULED FOR APPT.     Do you require a callback: YES

## 2022-08-18 NOTE — TELEPHONE ENCOUNTER
Per Dr. Almeida:   I do not go to [facility] and I do not have partners to go to [facility] if Sheila wants a physician to follow patient patient can choose any of the GI at [facility] and go there from now on.

## 2022-08-24 ENCOUNTER — TELEPHONE (OUTPATIENT)
Dept: GASTROENTEROLOGY | Facility: CLINIC | Age: 67
End: 2022-08-24

## 2022-08-25 ENCOUNTER — TELEPHONE (OUTPATIENT)
Dept: GASTROENTEROLOGY | Facility: CLINIC | Age: 67
End: 2022-08-25

## 2022-08-25 NOTE — TELEPHONE ENCOUNTER
Received phone call from Dianne [facility] radiology scheduler.   She received phone call from the patient stating the patient stating he cannot get his TIPPS procedure done at [facility] because he does not have an established GI MD that admits to [facility]. Advised will need to update Dr. Almeida.   Called Dianne wild, left .   Advised as per verbal order of Dr. Almeida: patient may proceed with TIPPS procedure at [facility]. Advised to call back with questions.

## 2022-08-26 ENCOUNTER — APPOINTMENT (OUTPATIENT)
Dept: CT IMAGING | Facility: HOSPITAL | Age: 67
End: 2022-08-26

## 2022-08-29 ENCOUNTER — TELEPHONE (OUTPATIENT)
Dept: GASTROENTEROLOGY | Facility: CLINIC | Age: 67
End: 2022-08-29

## 2022-08-29 DIAGNOSIS — R18.8 CIRRHOSIS OF LIVER WITH ASCITES, UNSPECIFIED HEPATIC CIRRHOSIS TYPE: ICD-10-CM

## 2022-08-29 DIAGNOSIS — K75.81 NASH (NONALCOHOLIC STEATOHEPATITIS): Primary | ICD-10-CM

## 2022-08-29 DIAGNOSIS — K74.60 CIRRHOSIS OF LIVER WITH ASCITES, UNSPECIFIED HEPATIC CIRRHOSIS TYPE: ICD-10-CM

## 2022-08-29 RX ORDER — ALBUMIN (HUMAN) 12.5 G/50ML
25 SOLUTION INTRAVENOUS ONCE
Status: CANCELLED | OUTPATIENT
Start: 2022-08-29 | End: 2022-08-29

## 2022-08-29 NOTE — TELEPHONE ENCOUNTER
Regarding: FW: Paracentesis      ----- Message -----  From: Naya Dave RN  Sent: 8/26/2022  11:42 AM EDT  To: David Almeida MD  Subject: FW: Paracentesis                                 I hate to bother you with this again. Please read pt note.  Per the notes from IR they are currently getting off 12+ litres every 2 weeks.  Last paracentesis was 8/9. Thanks   ----- Message -----  From: Lei Rogelio  Sent: 8/25/2022   5:42 PM EDT  To: [facility]  Subject: Paracentesis                                     Swapna,  I can’t wait 2 more weeks.  I am going out of town Sept. 1…..so I got to get this done.  As of now I am pursuing another doctor.  However. Since I will be a new patient I have to get drained by Dr Almeida orders.  Patients should not have to deal with this ongoing issues from any nurse, doctor or scheduling nurse.  I’m trying to get my medical records faxed and that has been a issue as well.   I’m 68 years old and have been pretty successful in my career and to be jerked around by you and Dianne (scheduling tech), isn’t going to work.

## 2022-08-29 NOTE — TELEPHONE ENCOUNTER
Caller: Lei Mercado    Relationship: Self    Best call back number: [phone]    Who are you requesting to speak with (clinical staff, provider,  specific staff member): ALLYN GUTIÉRREZ/DR ALCARAZ    What was the call regarding: PT ADVISES HE NEEDS TO HAVE PARACENTESIS DONE. UPSET HE CANNOT GET SCHEDULED WITH DR ALCARAZ-HE REFUSED BECAUSE OF THE PROCEDURE. PT IS IN PAIN ADVISED HE NEEDS IT DONE HAS GONE 4 WEEKS WITHOUT THE PROCEDURE    Do you require a callback: YES, PLEASE URGENT

## 2022-08-30 ENCOUNTER — HOSPITAL ENCOUNTER (EMERGENCY)
Facility: HOSPITAL | Age: 67
Discharge: HOME | End: 2022-08-30
Attending: EMERGENCY MEDICINE | Admitting: EMERGENCY MEDICINE

## 2022-08-30 VITALS
WEIGHT: 275.57 LBS | HEART RATE: 80 BPM | HEIGHT: 74 IN | TEMPERATURE: 98.7 F | SYSTOLIC BLOOD PRESSURE: 130 MMHG | OXYGEN SATURATION: 100 % | BODY MASS INDEX: 35.37 KG/M2 | DIASTOLIC BLOOD PRESSURE: 65 MMHG | RESPIRATION RATE: 16 BRPM

## 2022-08-30 DIAGNOSIS — R18.8 CIRRHOSIS OF LIVER WITH ASCITES, UNSPECIFIED HEPATIC CIRRHOSIS TYPE: Primary | ICD-10-CM

## 2022-08-30 DIAGNOSIS — K74.60 CIRRHOSIS OF LIVER WITH ASCITES, UNSPECIFIED HEPATIC CIRRHOSIS TYPE: Primary | ICD-10-CM

## 2022-08-30 LAB
DEPRECATED RDW RBC AUTO: 51.6 FL (ref 37–54)
ERYTHROCYTE [DISTWIDTH] IN BLOOD BY AUTOMATED COUNT: 18.6 % (ref 12.3–15.4)
HCT VFR BLD AUTO: 27.2 % (ref 37.5–51)
HGB BLD-MCNC: 8.5 G/DL (ref 13–17.7)
INR PPP: 1.14 (ref 0.93–1.1)
MCH RBC QN AUTO: 24.5 PG (ref 26.6–33)
MCHC RBC AUTO-ENTMCNC: 31.1 G/DL (ref 31.5–35.7)
MCV RBC AUTO: 78.6 FL (ref 79–97)
PLATELET # BLD AUTO: 118 10*3/MM3 (ref 140–450)
PMV BLD AUTO: 7.3 FL (ref 6–12)
PROTHROMBIN TIME: 11.7 SECONDS (ref 9.6–11.7)
RBC # BLD AUTO: 3.47 10*6/MM3 (ref 4.14–5.8)
WBC NRBC COR # BLD: 2.2 10*3/MM3 (ref 3.4–10.8)

## 2022-08-30 PROCEDURE — 99283 EMERGENCY DEPT VISIT LOW MDM: CPT

## 2022-08-30 PROCEDURE — 49083 ABD PARACENTESIS W/IMAGING: CPT | Performed by: FAMILY MEDICINE

## 2022-08-30 PROCEDURE — 76942 ECHO GUIDE FOR BIOPSY: CPT

## 2022-08-30 PROCEDURE — 96366 THER/PROPH/DIAG IV INF ADDON: CPT

## 2022-08-30 PROCEDURE — P9047 ALBUMIN (HUMAN), 25%, 50ML: HCPCS | Performed by: PHYSICIAN ASSISTANT

## 2022-08-30 PROCEDURE — 25010000002 ALBUMIN HUMAN 25% PER 50 ML: Performed by: PHYSICIAN ASSISTANT

## 2022-08-30 PROCEDURE — 85610 PROTHROMBIN TIME: CPT | Performed by: PHYSICIAN ASSISTANT

## 2022-08-30 PROCEDURE — 85027 COMPLETE CBC AUTOMATED: CPT | Performed by: PHYSICIAN ASSISTANT

## 2022-08-30 PROCEDURE — 36415 COLL VENOUS BLD VENIPUNCTURE: CPT

## 2022-08-30 PROCEDURE — 96365 THER/PROPH/DIAG IV INF INIT: CPT

## 2022-08-30 RX ORDER — ALBUMIN (HUMAN) 12.5 G/50ML
87.5 SOLUTION INTRAVENOUS ONCE
Status: COMPLETED | OUTPATIENT
Start: 2022-08-30 | End: 2022-08-30

## 2022-08-30 RX ORDER — ALBUMIN (HUMAN) 12.5 G/50ML
75 SOLUTION INTRAVENOUS ONCE
Status: COMPLETED | OUTPATIENT
Start: 2022-08-30 | End: 2022-08-30

## 2022-08-30 RX ORDER — ALBUMIN (HUMAN) 12.5 G/50ML
62.5 SOLUTION INTRAVENOUS ONCE
Status: COMPLETED | OUTPATIENT
Start: 2022-08-30 | End: 2022-08-30

## 2022-08-30 RX ORDER — ALBUMIN (HUMAN) 12.5 G/50ML
37.5 SOLUTION INTRAVENOUS ONCE
Status: COMPLETED | OUTPATIENT
Start: 2022-08-30 | End: 2022-08-30

## 2022-08-30 RX ORDER — ALBUMIN (HUMAN) 12.5 G/50ML
50 SOLUTION INTRAVENOUS ONCE
Status: COMPLETED | OUTPATIENT
Start: 2022-08-30 | End: 2022-08-30

## 2022-08-30 RX ORDER — ALBUMIN (HUMAN) 12.5 G/50ML
100 SOLUTION INTRAVENOUS ONCE
Status: COMPLETED | OUTPATIENT
Start: 2022-08-30 | End: 2022-08-30

## 2022-08-30 RX ORDER — ALBUMIN (HUMAN) 12.5 G/50ML
112.5 SOLUTION INTRAVENOUS ONCE
Status: COMPLETED | OUTPATIENT
Start: 2022-08-30 | End: 2022-08-30

## 2022-08-30 RX ADMIN — ALBUMIN HUMAN 100 G: 0.25 SOLUTION INTRAVENOUS at 13:12

## 2022-09-02 ENCOUNTER — TELEPHONE (OUTPATIENT)
Dept: GASTROENTEROLOGY | Facility: CLINIC | Age: 67
End: 2022-09-02

## 2022-09-09 ENCOUNTER — TELEPHONE (OUTPATIENT)
Dept: GASTROENTEROLOGY | Facility: CLINIC | Age: 67
End: 2022-09-09

## 2022-09-09 NOTE — TELEPHONE ENCOUNTER
Called patient about overdue BMP. Patient states he is seeking care elsewhere and does not want test done.

## 2022-09-09 NOTE — TELEPHONE ENCOUNTER
----- Message from David Almeida MD sent at 9/3/2022  4:07 PM EDT -----  Let patient know Eho is available in the chart, we can send to his new MD when he lets us know who this will be

## 2022-09-14 ENCOUNTER — HOSPITAL ENCOUNTER (OUTPATIENT)
Dept: INFUSION THERAPY | Facility: HOSPITAL | Age: 67
Discharge: HOME | End: 2022-09-14
Admitting: INTERNAL MEDICINE

## 2022-09-14 VITALS
OXYGEN SATURATION: 98 % | DIASTOLIC BLOOD PRESSURE: 45 MMHG | SYSTOLIC BLOOD PRESSURE: 121 MMHG | HEART RATE: 79 BPM | RESPIRATION RATE: 21 BRPM

## 2022-09-14 DIAGNOSIS — K75.81 NASH (NONALCOHOLIC STEATOHEPATITIS): ICD-10-CM

## 2022-09-14 DIAGNOSIS — K74.69 OTHER CIRRHOSIS OF LIVER: ICD-10-CM

## 2022-09-14 DIAGNOSIS — R18.8 OTHER ASCITES: ICD-10-CM

## 2022-09-14 PROCEDURE — 96365 THER/PROPH/DIAG IV INF INIT: CPT

## 2022-09-14 PROCEDURE — 96366 THER/PROPH/DIAG IV INF ADDON: CPT

## 2022-09-14 PROCEDURE — P9047 ALBUMIN (HUMAN), 25%, 50ML: HCPCS | Performed by: INTERNAL MEDICINE

## 2022-09-14 PROCEDURE — 76942 ECHO GUIDE FOR BIOPSY: CPT

## 2022-09-14 PROCEDURE — 49083 ABD PARACENTESIS W/IMAGING: CPT | Performed by: INTERNAL MEDICINE

## 2022-09-14 PROCEDURE — 25010000002 ALBUMIN HUMAN 25% PER 50 ML: Performed by: INTERNAL MEDICINE

## 2022-09-14 RX ORDER — ALBUMIN (HUMAN) 12.5 G/50ML
12.5 SOLUTION INTRAVENOUS DAILY PRN
Status: CANCELLED | OUTPATIENT
Start: 2022-09-14

## 2022-09-14 RX ORDER — ALBUMIN (HUMAN) 12.5 G/50ML
12.5 SOLUTION INTRAVENOUS DAILY PRN
Status: DISCONTINUED | OUTPATIENT
Start: 2022-09-14 | End: 2022-09-16 | Stop reason: HOSPADM

## 2022-09-14 RX ORDER — ALBUMIN (HUMAN) 12.5 G/50ML
25 SOLUTION INTRAVENOUS DAILY PRN
Status: CANCELLED | OUTPATIENT
Start: 2022-09-14

## 2022-09-14 RX ORDER — ALBUMIN (HUMAN) 12.5 G/50ML
25 SOLUTION INTRAVENOUS DAILY PRN
Status: COMPLETED | OUTPATIENT
Start: 2022-09-14 | End: 2022-09-14

## 2022-09-14 RX ADMIN — ALBUMIN (HUMAN) 25 G: 0.25 INJECTION, SOLUTION INTRAVENOUS at 09:58

## 2022-09-14 RX ADMIN — ALBUMIN (HUMAN) 25 G: 0.25 INJECTION, SOLUTION INTRAVENOUS at 10:32

## 2022-09-14 RX ADMIN — ALBUMIN (HUMAN) 25 G: 0.25 INJECTION, SOLUTION INTRAVENOUS at 09:27

## 2022-09-14 RX ADMIN — ALBUMIN (HUMAN) 25 G: 0.25 INJECTION, SOLUTION INTRAVENOUS at 11:16

## 2022-09-14 NOTE — PROGRESS NOTES
PARACENTESIS    Procedure: Paracentesis    Time Out Completed: yes    Time Out: 0916    Prep Site 1: Right Lateral Abdomen      Prep: Chlorhexidine and Sterile drape    Procedure Position: Right Side and Semi Fowlers    Guidance: Ultrasound    Fluid Quality: Clear and Yellow    Procedure Note: Procedure start at 0919. 1% lidocaine used for local anesthetic. No complications noted. Will continue to monitor.    Post-Procedure Position: Seated    Dressing Site 1: 2x2, 4x4 and Tegaderm    Post Procedure Status:  Alert and Oriented, returned to baseline, Dressing Dry/ Intact, IV documented (see flowsheet), Stable Condition and Dressing properly labeled    Specimen To Lab: no    Total Fluid Removed: 13.0 liters    Post Procedure Note:  Procedure complete at 1045. 100 grams of albumin given per MD order. No complications noted.    Discharge Time: 1205    Method: Ambulatory

## 2022-09-14 NOTE — PROCEDURES
Procedure:Ultrasound-guided Paracentesis    Consent: Informed    Pre-op diagnosis: Cirrhosis, Massive ascites    Post-op diagnosis: Cirrhosis, Massive ascites    Anesthesia Provider: .Pranav Cantu MD    Anesthesia type: Local infiltration with 1% Xylocaine    Surgeon: Dayanara Cantu MD    Assistant: none    Procedure summary:    Allergies , labs and medications were reviewed. Patient was made to lie supine and the area of interest was imaged with ultrasound and fluid verified and marked.  Area of interest was cleaned and draped in a sterile fashion. Subsequently local infiltration with Xylocaine. Trocar and cannula were advanced. Upon verification of the fluid, trocar was steadied and cannula advanced further. Trocar was drawn out. Cannula was connected to extension tubing and to the vacuum bottle. Subsequently the cannula was removed after completion of the procedure.     Findings: Clear straw yellow fluid was obtained. See nursing documentation for volume drained.    Lab Specimen: Not requested    Complication: none    EBL: 0 mL    Post-op condition: Stable. Albumin was given by protocol if needed.    Post-op plan: Discharge back to the referring physician.

## 2022-09-22 ENCOUNTER — OFFICE (AMBULATORY)
Dept: URBAN - METROPOLITAN AREA CLINIC 64 | Facility: CLINIC | Age: 67
End: 2022-09-22
Payer: COMMERCIAL

## 2022-09-22 VITALS
WEIGHT: 263 LBS | SYSTOLIC BLOOD PRESSURE: 99 MMHG | HEART RATE: 94 BPM | DIASTOLIC BLOOD PRESSURE: 49 MMHG | HEIGHT: 74 IN

## 2022-09-22 DIAGNOSIS — K74.69 OTHER CIRRHOSIS OF LIVER: ICD-10-CM

## 2022-09-22 DIAGNOSIS — R18.8 OTHER ASCITES: ICD-10-CM

## 2022-09-22 PROCEDURE — 99214 OFFICE O/P EST MOD 30 MIN: CPT | Performed by: INTERNAL MEDICINE

## 2022-09-22 RX ORDER — ZINC GLUCONATE 50 MG
50 TABLET ORAL
Qty: 30 | Refills: 3 | Status: ACTIVE
Start: 2022-09-22

## 2022-09-22 RX ORDER — SPIRONOLACTONE 100 MG/1
TABLET, FILM COATED ORAL
Qty: 30 | Refills: 3 | Status: ACTIVE
Start: 2022-09-22

## 2022-09-22 RX ORDER — FUROSEMIDE 40 MG/1
40 TABLET ORAL
Qty: 30 | Refills: 3 | Status: ACTIVE
Start: 2022-09-22

## 2022-10-03 RX ORDER — ALBUMIN (HUMAN) 12.5 G/50ML
25 SOLUTION INTRAVENOUS DAILY PRN
Status: CANCELLED | OUTPATIENT
Start: 2022-10-05

## 2022-10-03 RX ORDER — ALBUMIN (HUMAN) 12.5 G/50ML
12.5 SOLUTION INTRAVENOUS DAILY PRN
Status: CANCELLED | OUTPATIENT
Start: 2022-10-05

## 2022-10-03 RX ORDER — LIDOCAINE HYDROCHLORIDE 10 MG/ML
10 INJECTION, SOLUTION INFILTRATION; PERINEURAL AS NEEDED
Status: CANCELLED | OUTPATIENT
Start: 2022-10-05

## 2022-10-05 ENCOUNTER — HOSPITAL ENCOUNTER (OUTPATIENT)
Dept: INFUSION THERAPY | Facility: HOSPITAL | Age: 67
Discharge: HOME | End: 2022-10-05
Admitting: INTERNAL MEDICINE

## 2022-10-05 VITALS
OXYGEN SATURATION: 99 % | SYSTOLIC BLOOD PRESSURE: 132 MMHG | HEART RATE: 74 BPM | RESPIRATION RATE: 14 BRPM | TEMPERATURE: 97.4 F | DIASTOLIC BLOOD PRESSURE: 60 MMHG

## 2022-10-05 DIAGNOSIS — K74.60 CIRRHOSIS OF LIVER WITH ASCITES, UNSPECIFIED HEPATIC CIRRHOSIS TYPE: ICD-10-CM

## 2022-10-05 DIAGNOSIS — K75.81 NASH (NONALCOHOLIC STEATOHEPATITIS): ICD-10-CM

## 2022-10-05 DIAGNOSIS — R18.8 CIRRHOSIS OF LIVER WITH ASCITES, UNSPECIFIED HEPATIC CIRRHOSIS TYPE: ICD-10-CM

## 2022-10-05 PROCEDURE — 0 LIDOCAINE 1 % SOLUTION: Performed by: INTERNAL MEDICINE

## 2022-10-05 PROCEDURE — 76942 ECHO GUIDE FOR BIOPSY: CPT

## 2022-10-05 PROCEDURE — P9047 ALBUMIN (HUMAN), 25%, 50ML: HCPCS | Performed by: INTERNAL MEDICINE

## 2022-10-05 PROCEDURE — 36415 COLL VENOUS BLD VENIPUNCTURE: CPT

## 2022-10-05 PROCEDURE — 25010000002 ALBUMIN HUMAN 25% PER 50 ML: Performed by: INTERNAL MEDICINE

## 2022-10-05 PROCEDURE — 96365 THER/PROPH/DIAG IV INF INIT: CPT

## 2022-10-05 PROCEDURE — 96366 THER/PROPH/DIAG IV INF ADDON: CPT

## 2022-10-05 RX ORDER — ALBUMIN (HUMAN) 12.5 G/50ML
12.5 SOLUTION INTRAVENOUS DAILY PRN
Status: DISCONTINUED | OUTPATIENT
Start: 2022-10-05 | End: 2022-10-07 | Stop reason: HOSPADM

## 2022-10-05 RX ORDER — ALBUMIN (HUMAN) 12.5 G/50ML
25 SOLUTION INTRAVENOUS ONCE
Status: DISCONTINUED | OUTPATIENT
Start: 2022-10-05 | End: 2022-10-07 | Stop reason: HOSPADM

## 2022-10-05 RX ORDER — LIDOCAINE HYDROCHLORIDE 10 MG/ML
10 INJECTION, SOLUTION INFILTRATION; PERINEURAL AS NEEDED
Status: DISCONTINUED | OUTPATIENT
Start: 2022-10-05 | End: 2022-10-07 | Stop reason: HOSPADM

## 2022-10-05 RX ORDER — ALBUMIN (HUMAN) 12.5 G/50ML
25 SOLUTION INTRAVENOUS DAILY PRN
Status: DISCONTINUED | OUTPATIENT
Start: 2022-10-05 | End: 2022-10-07 | Stop reason: HOSPADM

## 2022-10-05 RX ADMIN — LIDOCAINE HYDROCHLORIDE 10 ML: 10 INJECTION, SOLUTION INFILTRATION; PERINEURAL at 10:10

## 2022-10-05 RX ADMIN — ALBUMIN (HUMAN) 87.5 G: 0.25 INJECTION, SOLUTION INTRAVENOUS at 10:20

## 2022-10-05 NOTE — PROCEDURES
Procedure:Ultrasound-guided Paracentesis    Consent: Informed    Pre-op diagnosis: Cirrhosis, Massive ascites    Post-op diagnosis: Cirrhosis, Massive ascites    Anesthesia Provider: .Pranav Cantu MD    Anesthesia type: Local infiltration with 1% Xylocaine    Surgeon: Dayanara Cantu MD    Assistant: none    Procedure summary:  Of Note;  Time out  was done at the bedside with RN and patient  Allergies , labs and medications were reviewed. Patient was made to lie supine and the area of interest was imaged with ultrasound and fluid verified and marked.  Area of interest was cleaned and draped in a sterile fashion. Subsequently local infiltration with Xylocaine. Trocar and cannula were advanced. Upon verification of the fluid, trocar was steadied and cannula advanced further. Trocar was drawn out. Cannula was connected to extension tubing and to the vacuum bottle. Subsequently the cannula was removed after completion of the procedure.     Findings: Clear straw yellow fluid was obtained. See nursing documentation for volume drained.    Lab Specimen: *Not requested    Complication: none    EBL: 0 mL    Post-op condition: Stable. Albumin was given by protocol if needed.    Post-op plan: Discharge back to the referring physician.

## 2022-10-13 ENCOUNTER — TELEPHONE (OUTPATIENT)
Dept: GASTROENTEROLOGY | Facility: CLINIC | Age: 67
End: 2022-10-13

## 2022-10-13 NOTE — TELEPHONE ENCOUNTER
Hub staff attempted to follow warm transfer process and was unsuccessful     Caller: Lei Mercado    Relationship to patient: Self    Best call back number: [phone]    Patient is needing: RETURNING MISSED CALL TO SCHEDULE PROCEDURE

## 2022-10-18 ENCOUNTER — OFFICE (AMBULATORY)
Dept: URBAN - METROPOLITAN AREA CLINIC 64 | Facility: CLINIC | Age: 67
End: 2022-10-18

## 2022-10-18 VITALS
DIASTOLIC BLOOD PRESSURE: 73 MMHG | SYSTOLIC BLOOD PRESSURE: 109 MMHG | HEART RATE: 95 BPM | HEIGHT: 74 IN | WEIGHT: 246 LBS

## 2022-10-18 DIAGNOSIS — D69.6 THROMBOCYTOPENIA, UNSPECIFIED: ICD-10-CM

## 2022-10-18 DIAGNOSIS — R18.8 OTHER ASCITES: ICD-10-CM

## 2022-10-18 DIAGNOSIS — K92.1 MELENA: ICD-10-CM

## 2022-10-18 DIAGNOSIS — D50.9 IRON DEFICIENCY ANEMIA, UNSPECIFIED: ICD-10-CM

## 2022-10-18 PROCEDURE — 99214 OFFICE O/P EST MOD 30 MIN: CPT | Performed by: INTERNAL MEDICINE

## 2022-10-21 ENCOUNTER — TRANSCRIBE ORDERS (OUTPATIENT)
Dept: ADMINISTRATIVE | Facility: HOSPITAL | Age: 67
End: 2022-10-21

## 2022-10-21 DIAGNOSIS — D50.9 IRON DEFICIENCY ANEMIA, UNSPECIFIED IRON DEFICIENCY ANEMIA TYPE: ICD-10-CM

## 2022-10-21 DIAGNOSIS — K92.1 BLACK STOOL: ICD-10-CM

## 2022-10-21 DIAGNOSIS — D69.6 THROMBOCYTOPENIA, UNSPECIFIED: ICD-10-CM

## 2022-10-21 DIAGNOSIS — K75.81 NASH (NONALCOHOLIC STEATOHEPATITIS): ICD-10-CM

## 2022-10-21 DIAGNOSIS — Z87.898 HX OF ASCITES: Primary | ICD-10-CM

## 2022-10-27 ENCOUNTER — HOSPITAL ENCOUNTER (OUTPATIENT)
Dept: ULTRASOUND IMAGING | Facility: HOSPITAL | Age: 67
Discharge: HOME | End: 2022-10-27
Admitting: INTERNAL MEDICINE

## 2022-10-27 DIAGNOSIS — D69.6 THROMBOCYTOPENIA, UNSPECIFIED: ICD-10-CM

## 2022-10-27 DIAGNOSIS — K75.81 NASH (NONALCOHOLIC STEATOHEPATITIS): ICD-10-CM

## 2022-10-27 DIAGNOSIS — K92.1 BLACK STOOL: ICD-10-CM

## 2022-10-27 DIAGNOSIS — D50.9 IRON DEFICIENCY ANEMIA, UNSPECIFIED IRON DEFICIENCY ANEMIA TYPE: ICD-10-CM

## 2022-10-27 DIAGNOSIS — Z87.898 HX OF ASCITES: ICD-10-CM

## 2022-10-27 PROCEDURE — 76705 ECHO EXAM OF ABDOMEN: CPT

## 2022-11-01 DIAGNOSIS — K74.60 CIRRHOSIS OF LIVER WITH ASCITES, UNSPECIFIED HEPATIC CIRRHOSIS TYPE: Primary | ICD-10-CM

## 2022-11-01 DIAGNOSIS — R18.8 CIRRHOSIS OF LIVER WITH ASCITES, UNSPECIFIED HEPATIC CIRRHOSIS TYPE: Primary | ICD-10-CM

## 2022-11-01 RX ORDER — LIDOCAINE HYDROCHLORIDE 10 MG/ML
10 INJECTION, SOLUTION INFILTRATION; PERINEURAL AS NEEDED
Status: CANCELLED | OUTPATIENT
Start: 2022-11-02

## 2022-11-01 RX ORDER — ALBUMIN (HUMAN) 12.5 G/50ML
25 SOLUTION INTRAVENOUS DAILY PRN
Status: CANCELLED | OUTPATIENT
Start: 2022-11-02

## 2022-11-01 RX ORDER — ALBUMIN (HUMAN) 12.5 G/50ML
12.5 SOLUTION INTRAVENOUS DAILY PRN
Status: CANCELLED | OUTPATIENT
Start: 2022-11-02

## 2022-11-02 ENCOUNTER — HOSPITAL ENCOUNTER (OUTPATIENT)
Dept: INFUSION THERAPY | Facility: HOSPITAL | Age: 67
Discharge: HOME | End: 2022-11-02
Admitting: INTERNAL MEDICINE

## 2022-11-02 VITALS
DIASTOLIC BLOOD PRESSURE: 51 MMHG | OXYGEN SATURATION: 100 % | HEART RATE: 71 BPM | TEMPERATURE: 97.3 F | SYSTOLIC BLOOD PRESSURE: 131 MMHG | RESPIRATION RATE: 21 BRPM

## 2022-11-02 DIAGNOSIS — K74.60 CIRRHOSIS OF LIVER WITH ASCITES, UNSPECIFIED HEPATIC CIRRHOSIS TYPE: ICD-10-CM

## 2022-11-02 DIAGNOSIS — R18.8 CIRRHOSIS OF LIVER WITH ASCITES, UNSPECIFIED HEPATIC CIRRHOSIS TYPE: ICD-10-CM

## 2022-11-02 LAB
DEPRECATED RDW RBC AUTO: 59.5 FL (ref 37–54)
ERYTHROCYTE [DISTWIDTH] IN BLOOD BY AUTOMATED COUNT: 18.4 % (ref 12.3–15.4)
HCT VFR BLD AUTO: 28.3 % (ref 37.5–51)
HGB BLD-MCNC: 9 G/DL (ref 13–17.7)
INR PPP: 1.13 (ref 0.93–1.1)
MCH RBC QN AUTO: 27.4 PG (ref 26.6–33)
MCHC RBC AUTO-ENTMCNC: 31.7 G/DL (ref 31.5–35.7)
MCV RBC AUTO: 86.6 FL (ref 79–97)
PLATELET # BLD AUTO: 139 10*3/MM3 (ref 140–450)
PMV BLD AUTO: 7.2 FL (ref 6–12)
PROTHROMBIN TIME: 11.6 SECONDS (ref 9.6–11.7)
RBC # BLD AUTO: 3.27 10*6/MM3 (ref 4.14–5.8)
WBC NRBC COR # BLD: 1.8 10*3/MM3 (ref 3.4–10.8)

## 2022-11-02 PROCEDURE — 87205 SMEAR GRAM STAIN: CPT | Performed by: INTERNAL MEDICINE

## 2022-11-02 PROCEDURE — 76942 ECHO GUIDE FOR BIOPSY: CPT

## 2022-11-02 PROCEDURE — 87070 CULTURE OTHR SPECIMN AEROBIC: CPT | Performed by: INTERNAL MEDICINE

## 2022-11-02 PROCEDURE — 96365 THER/PROPH/DIAG IV INF INIT: CPT

## 2022-11-02 PROCEDURE — 85610 PROTHROMBIN TIME: CPT | Performed by: INTERNAL MEDICINE

## 2022-11-02 PROCEDURE — 96366 THER/PROPH/DIAG IV INF ADDON: CPT

## 2022-11-02 PROCEDURE — 25010000002 ALBUMIN HUMAN 25% PER 50 ML: Performed by: INTERNAL MEDICINE

## 2022-11-02 PROCEDURE — 36415 COLL VENOUS BLD VENIPUNCTURE: CPT

## 2022-11-02 PROCEDURE — 85027 COMPLETE CBC AUTOMATED: CPT | Performed by: INTERNAL MEDICINE

## 2022-11-02 PROCEDURE — P9047 ALBUMIN (HUMAN), 25%, 50ML: HCPCS | Performed by: INTERNAL MEDICINE

## 2022-11-02 RX ORDER — ALBUMIN (HUMAN) 12.5 G/50ML
25 SOLUTION INTRAVENOUS DAILY PRN
Status: COMPLETED | OUTPATIENT
Start: 2022-11-02 | End: 2022-11-02

## 2022-11-02 RX ORDER — LIDOCAINE HYDROCHLORIDE 10 MG/ML
10 INJECTION, SOLUTION INFILTRATION; PERINEURAL AS NEEDED
Status: DISCONTINUED | OUTPATIENT
Start: 2022-11-02 | End: 2022-11-04 | Stop reason: HOSPADM

## 2022-11-02 RX ORDER — ALBUMIN (HUMAN) 12.5 G/50ML
12.5 SOLUTION INTRAVENOUS DAILY PRN
Status: DISCONTINUED | OUTPATIENT
Start: 2022-11-02 | End: 2022-11-04 | Stop reason: HOSPADM

## 2022-11-02 RX ADMIN — LIDOCAINE HYDROCHLORIDE 10 ML: 10 INJECTION, SOLUTION EPIDURAL; INFILTRATION; INTRACAUDAL; PERINEURAL at 12:47

## 2022-11-02 RX ADMIN — ALBUMIN (HUMAN) 25 G: 0.25 INJECTION, SOLUTION INTRAVENOUS at 13:46

## 2022-11-02 RX ADMIN — ALBUMIN (HUMAN) 25 G: 0.25 INJECTION, SOLUTION INTRAVENOUS at 14:21

## 2022-11-02 RX ADMIN — ALBUMIN (HUMAN) 25 G: 0.25 INJECTION, SOLUTION INTRAVENOUS at 15:06

## 2022-11-02 RX ADMIN — ALBUMIN (HUMAN) 25 G: 0.25 INJECTION, SOLUTION INTRAVENOUS at 13:13

## 2022-11-02 NOTE — PROGRESS NOTES
PARACENTESIS    Procedure: Paracentesis    Time Out Completed: yes    Time Out: 1244    Prep Site 1: Right Lateral Abdomen      Prep: Chlorhexidine and Sterile drape    Procedure Position: Supine and Semi Fowlers    Guidance: Ultrasound    Fluid Quality: Clear and Yellow    Procedure Note: Procedure start at 1247. 1% lidocaine used for local anesthetic. No complications noted. Will continue to monitor.    Post-Procedure Position: Supine    Dressing Site 1: 2x2, 4x4 and Tegaderm    Post Procedure Status:  Alert and Oriented, returned to baseline, Dressing Dry/ Intact, IV documented (see flowsheet), Stable Condition and Dressing properly labeled    Specimen To Lab: yes    Total Fluid Removed: 13.8 liters    Post Procedure Note:  Procedure complete at 1415. 100 grams of albumin given per MD order. No complications noted.

## 2022-11-02 NOTE — PROCEDURES
Procedure:Ultrasound-guided Paracentesis    Consent: Informed    Pre-op diagnosis: Cirrhosis, Massive ascites    Post-op diagnosis: Cirrhosis, Massive ascites    Anesthesia Provider: .Maurilio Nichols MD    Anesthesia type: Local infiltration with 1% Xylocaine    Surgeon: .Maurilio Nichols MD    Assistant: none    Procedure summary:  Of Note;  Time out  was done at the bedside with RN and patient  Allergies , labs and medications were reviewed. Patient was made to lie supine and the area of interest was imaged with ultrasound and fluid verified and marked.  Area of interest was cleaned and draped in a sterile fashion. Subsequently local infiltration with Xylocaine. Trocar and cannula were advanced. Upon verification of the fluid, trocar was steadied and cannula advanced further. Trocar was drawn out. Cannula was connected to extension tubing and to the vacuum bottle. Subsequently the cannula was removed after completion of the procedure.     Findings: Clear straw yellow fluid was obtained. See nursing documentation for volume drained.    Lab Specimen: n/a    Complication: none    EBL: 0 mL    Post-op condition: Stable. Albumin was given by protocol if needed.    Post-op plan: Discharge back to the referring physician.    Electronically signed by Maurilio Nichols MD, 11/02/22, 3:35 PM EDT.

## 2022-11-07 LAB
BACTERIA FLD CULT: NORMAL
GRAM STN SPEC: NORMAL
GRAM STN SPEC: NORMAL

## 2022-11-15 ENCOUNTER — HOSPITAL ENCOUNTER (OUTPATIENT)
Dept: INFUSION THERAPY | Facility: HOSPITAL | Age: 67
Discharge: HOME | End: 2022-11-15
Admitting: INTERNAL MEDICINE

## 2022-11-15 VITALS
SYSTOLIC BLOOD PRESSURE: 152 MMHG | HEART RATE: 70 BPM | OXYGEN SATURATION: 100 % | RESPIRATION RATE: 19 BRPM | DIASTOLIC BLOOD PRESSURE: 61 MMHG

## 2022-11-15 DIAGNOSIS — R18.8 OTHER ASCITES: ICD-10-CM

## 2022-11-15 DIAGNOSIS — K74.69 OTHER CIRRHOSIS OF LIVER: ICD-10-CM

## 2022-11-15 DIAGNOSIS — K75.81 NASH (NONALCOHOLIC STEATOHEPATITIS): ICD-10-CM

## 2022-11-15 PROCEDURE — 25010000002 ALBUMIN HUMAN 25% PER 50 ML: Performed by: INTERNAL MEDICINE

## 2022-11-15 PROCEDURE — 96366 THER/PROPH/DIAG IV INF ADDON: CPT

## 2022-11-15 PROCEDURE — P9047 ALBUMIN (HUMAN), 25%, 50ML: HCPCS | Performed by: INTERNAL MEDICINE

## 2022-11-15 PROCEDURE — 76942 ECHO GUIDE FOR BIOPSY: CPT

## 2022-11-15 PROCEDURE — 96365 THER/PROPH/DIAG IV INF INIT: CPT

## 2022-11-15 RX ORDER — ALBUMIN (HUMAN) 12.5 G/50ML
12.5 SOLUTION INTRAVENOUS DAILY PRN
Status: CANCELLED | OUTPATIENT
Start: 2022-11-15

## 2022-11-15 RX ORDER — LIDOCAINE HYDROCHLORIDE 10 MG/ML
10 INJECTION, SOLUTION INFILTRATION; PERINEURAL AS NEEDED
Status: CANCELLED | OUTPATIENT
Start: 2022-11-15

## 2022-11-15 RX ORDER — ALBUMIN (HUMAN) 12.5 G/50ML
25 SOLUTION INTRAVENOUS DAILY PRN
Status: DISCONTINUED | OUTPATIENT
Start: 2022-11-15 | End: 2022-11-17 | Stop reason: HOSPADM

## 2022-11-15 RX ORDER — LIDOCAINE HYDROCHLORIDE 10 MG/ML
10 INJECTION, SOLUTION INFILTRATION; PERINEURAL AS NEEDED
Status: DISCONTINUED | OUTPATIENT
Start: 2022-11-15 | End: 2022-11-17 | Stop reason: HOSPADM

## 2022-11-15 RX ORDER — ALBUMIN (HUMAN) 12.5 G/50ML
12.5 SOLUTION INTRAVENOUS DAILY PRN
Status: DISCONTINUED | OUTPATIENT
Start: 2022-11-15 | End: 2022-11-17 | Stop reason: HOSPADM

## 2022-11-15 RX ORDER — ALBUMIN (HUMAN) 12.5 G/50ML
25 SOLUTION INTRAVENOUS DAILY PRN
Status: CANCELLED | OUTPATIENT
Start: 2022-11-15

## 2022-11-15 RX ADMIN — ALBUMIN (HUMAN) 12.5 G: 0.25 INJECTION, SOLUTION INTRAVENOUS at 14:48

## 2022-11-15 RX ADMIN — ALBUMIN (HUMAN) 25 G: 0.25 INJECTION, SOLUTION INTRAVENOUS at 14:11

## 2022-11-15 RX ADMIN — ALBUMIN (HUMAN) 25 G: 0.25 INJECTION, SOLUTION INTRAVENOUS at 13:04

## 2022-11-15 RX ADMIN — ALBUMIN (HUMAN) 25 G: 0.25 INJECTION, SOLUTION INTRAVENOUS at 13:32

## 2022-11-15 NOTE — NURSING NOTE
PARACENTESIS    Procedure: Paracentesis    Time Out Completed: yes    Time Out: 1239    Prep Site 1: Right Lateral Abdomen      Prep: Chlorhexidine and Sterile drape    Procedure Position: Right Side and Semi Fowlers    Guidance: Ultrasound    Fluid Quality: Cloudy and Yellow    Procedure Note: 1% lidocaine used for local anesthetic. No complications noted. Will continue to monitor.    Post-Procedure Position: Supine and HOB Elevated    Dressing Site 1: 2x2, 4x4 and Tegaderm    Post Procedure Status:  Alert and Oriented, returned to baseline, Dressing Dry/ Intact, IV documented (see flowsheet), Stable Condition and Dressing properly labeled    Specimen To Lab: no    Total Fluid Removed: 11.3 Liters    Post Procedure Note:  Procedure completed. 87.5 grams of albumin given per MD order. No complications noted.

## 2022-11-16 NOTE — PROCEDURES
"Therapeutic Bedside Paracentesis With Ultrasound Guidance    Date/Time: 11/15/2022 12:39 PM  Performed by: Andrew Mon APRN  Authorized by: Kevin Park MD   Consent: Written consent obtained.  Risks and benefits: risks, benefits and alternatives were discussed  Consent given by: patient  Patient understanding: patient states understanding of the procedure being performed  Patient consent: the patient's understanding of the procedure matches consent given  Procedure consent: procedure consent matches procedure scheduled  Relevant documents: relevant documents present and verified  Test results: test results available and properly labeled  Site marked: the operative site was marked  Imaging studies: imaging studies available  Required items: required blood products, implants, devices, and special equipment available  Patient identity confirmed: verbally with patient, arm band and provided demographic data  Time out: Immediately prior to procedure a \"time out\" was called to verify the correct patient, procedure, equipment, support staff and site/side marked as required.  Initial or subsequent exam: subsequent  Procedure purpose: therapeutic  Indications: abdominal discomfort secondary to ascites  Anesthesia: local infiltration    Anesthesia:  Local Anesthetic: lidocaine 1% without epinephrine  Anesthetic total: 10 mL    Sedation:  Patient sedated: no    Preparation: Patient was prepped and draped in the usual sterile fashion.  Needle gauge: 22  Ultrasound guidance: yes  Puncture site: right lower quadrant  Fluid removed: 11.3(ml)  Fluid appearance: serous  Dressing: 4x4 sterile gauze (Tegaderm)  Patient tolerance: patient tolerated the procedure well with no immediate complications        Electronically signed by DONALD Miles, 11/15/22, 10:55 PM EST.    "

## 2022-11-29 DIAGNOSIS — K74.60 CIRRHOSIS OF LIVER WITH ASCITES, UNSPECIFIED HEPATIC CIRRHOSIS TYPE: Primary | ICD-10-CM

## 2022-11-29 DIAGNOSIS — K75.81 NASH (NONALCOHOLIC STEATOHEPATITIS): ICD-10-CM

## 2022-11-29 DIAGNOSIS — R18.8 CIRRHOSIS OF LIVER WITH ASCITES, UNSPECIFIED HEPATIC CIRRHOSIS TYPE: Primary | ICD-10-CM

## 2022-11-29 RX ORDER — ALBUMIN (HUMAN) 12.5 G/50ML
12.5 SOLUTION INTRAVENOUS DAILY PRN
Status: CANCELLED | OUTPATIENT
Start: 2022-11-30

## 2022-11-29 RX ORDER — LIDOCAINE HYDROCHLORIDE 20 MG/ML
10 INJECTION, SOLUTION INFILTRATION; PERINEURAL AS NEEDED
Status: CANCELLED | OUTPATIENT
Start: 2022-11-30

## 2022-11-29 RX ORDER — ALBUMIN (HUMAN) 12.5 G/50ML
25 SOLUTION INTRAVENOUS DAILY PRN
Status: CANCELLED | OUTPATIENT
Start: 2022-11-30

## 2022-11-30 ENCOUNTER — HOSPITAL ENCOUNTER (OUTPATIENT)
Dept: INFUSION THERAPY | Facility: HOSPITAL | Age: 67
Discharge: HOME | End: 2022-11-30
Admitting: INTERNAL MEDICINE

## 2022-11-30 VITALS
DIASTOLIC BLOOD PRESSURE: 42 MMHG | SYSTOLIC BLOOD PRESSURE: 129 MMHG | OXYGEN SATURATION: 99 % | HEART RATE: 78 BPM | RESPIRATION RATE: 16 BRPM

## 2022-11-30 DIAGNOSIS — R18.8 CIRRHOSIS OF LIVER WITH ASCITES, UNSPECIFIED HEPATIC CIRRHOSIS TYPE: ICD-10-CM

## 2022-11-30 DIAGNOSIS — K75.81 NASH (NONALCOHOLIC STEATOHEPATITIS): ICD-10-CM

## 2022-11-30 DIAGNOSIS — K74.60 CIRRHOSIS OF LIVER WITH ASCITES, UNSPECIFIED HEPATIC CIRRHOSIS TYPE: ICD-10-CM

## 2022-11-30 LAB
DEPRECATED RDW RBC AUTO: 64.8 FL (ref 37–54)
ERYTHROCYTE [DISTWIDTH] IN BLOOD BY AUTOMATED COUNT: 18.4 % (ref 12.3–15.4)
HCT VFR BLD AUTO: 24 % (ref 37.5–51)
HGB BLD-MCNC: 7.7 G/DL (ref 13–17.7)
INR PPP: 1.16 (ref 0.93–1.1)
MCH RBC QN AUTO: 30.4 PG (ref 26.6–33)
MCHC RBC AUTO-ENTMCNC: 32.1 G/DL (ref 31.5–35.7)
MCV RBC AUTO: 94.6 FL (ref 79–97)
PLATELET # BLD AUTO: 113 10*3/MM3 (ref 140–450)
PMV BLD AUTO: 6.9 FL (ref 6–12)
PROTHROMBIN TIME: 11.8 SECONDS (ref 9.6–11.7)
RBC # BLD AUTO: 2.54 10*6/MM3 (ref 4.14–5.8)
WBC NRBC COR # BLD: 2.1 10*3/MM3 (ref 3.4–10.8)

## 2022-11-30 PROCEDURE — 76942 ECHO GUIDE FOR BIOPSY: CPT

## 2022-11-30 PROCEDURE — 96365 THER/PROPH/DIAG IV INF INIT: CPT

## 2022-11-30 PROCEDURE — 36415 COLL VENOUS BLD VENIPUNCTURE: CPT

## 2022-11-30 PROCEDURE — 96366 THER/PROPH/DIAG IV INF ADDON: CPT

## 2022-11-30 PROCEDURE — 25010000002 ALBUMIN HUMAN 25% PER 50 ML: Performed by: INTERNAL MEDICINE

## 2022-11-30 PROCEDURE — 85027 COMPLETE CBC AUTOMATED: CPT | Performed by: INTERNAL MEDICINE

## 2022-11-30 PROCEDURE — 85610 PROTHROMBIN TIME: CPT | Performed by: INTERNAL MEDICINE

## 2022-11-30 PROCEDURE — P9047 ALBUMIN (HUMAN), 25%, 50ML: HCPCS | Performed by: INTERNAL MEDICINE

## 2022-11-30 RX ORDER — LIDOCAINE HYDROCHLORIDE 20 MG/ML
10 INJECTION, SOLUTION INFILTRATION; PERINEURAL AS NEEDED
Status: DISCONTINUED | OUTPATIENT
Start: 2022-11-30 | End: 2022-12-02 | Stop reason: HOSPADM

## 2022-11-30 RX ORDER — ALBUMIN (HUMAN) 12.5 G/50ML
12.5 SOLUTION INTRAVENOUS DAILY PRN
Status: DISCONTINUED | OUTPATIENT
Start: 2022-11-30 | End: 2022-12-02 | Stop reason: HOSPADM

## 2022-11-30 RX ORDER — ALBUMIN (HUMAN) 12.5 G/50ML
25 SOLUTION INTRAVENOUS DAILY PRN
Status: DISCONTINUED | OUTPATIENT
Start: 2022-11-30 | End: 2022-12-02 | Stop reason: HOSPADM

## 2022-11-30 RX ADMIN — ALBUMIN (HUMAN) 25 G: 0.25 INJECTION, SOLUTION INTRAVENOUS at 10:20

## 2022-11-30 RX ADMIN — ALBUMIN (HUMAN) 12.5 G: 0.25 INJECTION, SOLUTION INTRAVENOUS at 11:40

## 2022-11-30 RX ADMIN — ALBUMIN (HUMAN) 25 G: 0.25 INJECTION, SOLUTION INTRAVENOUS at 11:00

## 2022-11-30 RX ADMIN — ALBUMIN (HUMAN) 25 G: 0.25 INJECTION, SOLUTION INTRAVENOUS at 09:34

## 2022-12-01 NOTE — PROCEDURES
"Therapeutic Bedside Paracentesis With Ultrasound Guidance    Date/Time: 11/30/2022 9:15 AM  Performed by: Andrew Mon APRN  Authorized by: Kevin Park MD   Consent: Written consent obtained.  Risks and benefits: risks, benefits and alternatives were discussed  Consent given by: patient  Patient understanding: patient states understanding of the procedure being performed  Patient consent: the patient's understanding of the procedure matches consent given  Procedure consent: procedure consent matches procedure scheduled  Relevant documents: relevant documents present and verified  Test results: test results available and properly labeled  Site marked: the operative site was marked  Imaging studies: imaging studies available  Required items: required blood products, implants, devices, and special equipment available  Patient identity confirmed: verbally with patient, arm band and provided demographic data  Time out: Immediately prior to procedure a \"time out\" was called to verify the correct patient, procedure, equipment, support staff and site/side marked as required.  Initial or subsequent exam: subsequent  Procedure purpose: therapeutic  Indications: abdominal discomfort secondary to ascites  Anesthesia: local infiltration    Anesthesia:  Local Anesthetic: lidocaine 1% without epinephrine  Anesthetic total: 10 mL    Sedation:  Patient sedated: no    Preparation: Patient was prepped and draped in the usual sterile fashion (All five maximal sterile barriers used- gloves, gown, cap, mask and large sterile sheet).  Needle gauge: 22  Ultrasound guidance: yes  Aspirate amount (ml): 91558.  Fluid appearance: clear and serous  Dressing: 4x4 sterile gauze (adhesive bandage)  Patient tolerance: patient tolerated the procedure well with no immediate complications        Electronically signed by DONALD Miles, 11/30/22, 10:36 PM EST.    "

## 2022-12-02 ENCOUNTER — APPOINTMENT (OUTPATIENT)
Dept: INFUSION THERAPY | Facility: HOSPITAL | Age: 67
End: 2022-12-02

## 2022-12-08 ENCOUNTER — OFFICE (AMBULATORY)
Dept: URBAN - METROPOLITAN AREA CLINIC 64 | Facility: CLINIC | Age: 67
End: 2022-12-08

## 2022-12-08 VITALS
HEIGHT: 74 IN | HEART RATE: 109 BPM | SYSTOLIC BLOOD PRESSURE: 133 MMHG | WEIGHT: 250 LBS | DIASTOLIC BLOOD PRESSURE: 75 MMHG

## 2022-12-08 DIAGNOSIS — R18.8 OTHER ASCITES: ICD-10-CM

## 2022-12-08 DIAGNOSIS — K74.69 OTHER CIRRHOSIS OF LIVER: ICD-10-CM

## 2022-12-08 DIAGNOSIS — R06.02 SHORTNESS OF BREATH: ICD-10-CM

## 2022-12-08 DIAGNOSIS — D50.9 IRON DEFICIENCY ANEMIA, UNSPECIFIED: ICD-10-CM

## 2022-12-08 DIAGNOSIS — D64.9 ANEMIA, UNSPECIFIED: ICD-10-CM

## 2022-12-08 PROCEDURE — 99214 OFFICE O/P EST MOD 30 MIN: CPT | Performed by: INTERNAL MEDICINE

## 2022-12-13 RX ORDER — ALBUMIN (HUMAN) 12.5 G/50ML
25 SOLUTION INTRAVENOUS DAILY PRN
Status: CANCELLED | OUTPATIENT
Start: 2022-12-15

## 2022-12-13 RX ORDER — LIDOCAINE HYDROCHLORIDE 20 MG/ML
10 INJECTION, SOLUTION INFILTRATION; PERINEURAL AS NEEDED
Status: CANCELLED | OUTPATIENT
Start: 2022-12-15

## 2022-12-13 RX ORDER — ALBUMIN (HUMAN) 12.5 G/50ML
12.5 SOLUTION INTRAVENOUS DAILY PRN
Status: CANCELLED | OUTPATIENT
Start: 2022-12-15

## 2022-12-15 ENCOUNTER — HOSPITAL ENCOUNTER (OUTPATIENT)
Dept: INFUSION THERAPY | Facility: HOSPITAL | Age: 67
Discharge: HOME | End: 2022-12-15
Admitting: INTERNAL MEDICINE

## 2022-12-15 VITALS
TEMPERATURE: 98 F | RESPIRATION RATE: 21 BRPM | DIASTOLIC BLOOD PRESSURE: 47 MMHG | HEART RATE: 88 BPM | OXYGEN SATURATION: 100 % | SYSTOLIC BLOOD PRESSURE: 118 MMHG

## 2022-12-15 DIAGNOSIS — K74.69 OTHER CIRRHOSIS OF LIVER: ICD-10-CM

## 2022-12-15 DIAGNOSIS — K75.81 NASH (NONALCOHOLIC STEATOHEPATITIS): ICD-10-CM

## 2022-12-15 DIAGNOSIS — R18.8 OTHER ASCITES: ICD-10-CM

## 2022-12-15 PROCEDURE — 25010000002 ALBUMIN HUMAN 25% PER 50 ML: Performed by: INTERNAL MEDICINE

## 2022-12-15 PROCEDURE — P9047 ALBUMIN (HUMAN), 25%, 50ML: HCPCS | Performed by: INTERNAL MEDICINE

## 2022-12-15 PROCEDURE — 76942 ECHO GUIDE FOR BIOPSY: CPT

## 2022-12-15 PROCEDURE — 96365 THER/PROPH/DIAG IV INF INIT: CPT

## 2022-12-15 PROCEDURE — 96366 THER/PROPH/DIAG IV INF ADDON: CPT

## 2022-12-15 PROCEDURE — 36415 COLL VENOUS BLD VENIPUNCTURE: CPT

## 2022-12-15 RX ORDER — ALBUMIN (HUMAN) 12.5 G/50ML
12.5 SOLUTION INTRAVENOUS DAILY PRN
Status: DISCONTINUED | OUTPATIENT
Start: 2022-12-15 | End: 2022-12-17 | Stop reason: HOSPADM

## 2022-12-15 RX ORDER — LIDOCAINE HYDROCHLORIDE 20 MG/ML
10 INJECTION, SOLUTION INFILTRATION; PERINEURAL AS NEEDED
Status: DISCONTINUED | OUTPATIENT
Start: 2022-12-15 | End: 2022-12-17 | Stop reason: HOSPADM

## 2022-12-15 RX ORDER — ALBUMIN (HUMAN) 12.5 G/50ML
25 SOLUTION INTRAVENOUS DAILY PRN
Status: COMPLETED | OUTPATIENT
Start: 2022-12-15 | End: 2022-12-15

## 2022-12-15 RX ADMIN — ALBUMIN (HUMAN) 25 G: 0.25 INJECTION, SOLUTION INTRAVENOUS at 13:12

## 2022-12-15 RX ADMIN — ALBUMIN (HUMAN) 25 G: 0.25 INJECTION, SOLUTION INTRAVENOUS at 13:47

## 2022-12-15 RX ADMIN — ALBUMIN (HUMAN) 25 G: 0.25 INJECTION, SOLUTION INTRAVENOUS at 14:29

## 2022-12-15 RX ADMIN — ALBUMIN (HUMAN) 25 G: 0.25 INJECTION, SOLUTION INTRAVENOUS at 12:39

## 2022-12-15 NOTE — PROCEDURES
Procedure:Ultrasound-guided Paracentesis    Consent: Informed    Pre-op diagnosis: Cirrhosis, Massive ascites    Post-op diagnosis: Cirrhosis, Massive ascites    Anesthesia Provider: .Pranav Cantu MD    Anesthesia type: Local infiltration with 1% Xylocaine    Surgeon: Dayanara Cantu MD    Assistant: none    Procedure summary:  Of Note;  Time out  was done at the bedside with RN and patient  Allergies , labs and medications were reviewed. Patient was made to lie supine and the area of interest was imaged with ultrasound and fluid verified and marked.  Area of interest was cleaned and draped in a sterile fashion. Subsequently local infiltration with Xylocaine. Trocar and cannula were advanced. Upon verification of the fluid, trocar was steadied and cannula advanced further. Trocar was drawn out. Cannula was connected to extension tubing and to the vacuum bottle. Subsequently the cannula was removed after completion of the procedure.     Findings: Clear straw yellow fluid was obtained. See nursing documentation for volume drained.    Lab Specimen: Not requested    Complication: none    EBL: 0 mL    Post-op condition: Stable. Albumin was given by protocol if needed.    Post-op plan: Discharge back to the referring physician.

## 2022-12-21 DIAGNOSIS — R18.8 CIRRHOSIS OF LIVER WITH ASCITES, UNSPECIFIED HEPATIC CIRRHOSIS TYPE: ICD-10-CM

## 2022-12-21 DIAGNOSIS — K74.60 CIRRHOSIS OF LIVER WITH ASCITES, UNSPECIFIED HEPATIC CIRRHOSIS TYPE: ICD-10-CM

## 2022-12-21 DIAGNOSIS — K75.81 NASH (NONALCOHOLIC STEATOHEPATITIS): Primary | ICD-10-CM

## 2022-12-21 RX ORDER — LIDOCAINE HYDROCHLORIDE 20 MG/ML
10 INJECTION, SOLUTION INFILTRATION; PERINEURAL AS NEEDED
Status: CANCELLED | OUTPATIENT
Start: 2022-12-22

## 2022-12-21 RX ORDER — ALBUMIN (HUMAN) 12.5 G/50ML
25 SOLUTION INTRAVENOUS DAILY PRN
Status: CANCELLED | OUTPATIENT
Start: 2022-12-22

## 2022-12-21 RX ORDER — ALBUMIN (HUMAN) 12.5 G/50ML
12.5 SOLUTION INTRAVENOUS DAILY PRN
Status: CANCELLED | OUTPATIENT
Start: 2022-12-22

## 2022-12-22 ENCOUNTER — HOSPITAL ENCOUNTER (OUTPATIENT)
Dept: INFUSION THERAPY | Facility: HOSPITAL | Age: 67
Discharge: HOME | End: 2022-12-22

## 2022-12-22 ENCOUNTER — HOSPITAL ENCOUNTER (OUTPATIENT)
Facility: HOSPITAL | Age: 67
Discharge: HOME | End: 2022-12-23
Attending: EMERGENCY MEDICINE | Admitting: EMERGENCY MEDICINE

## 2022-12-22 ENCOUNTER — APPOINTMENT (OUTPATIENT)
Dept: CT IMAGING | Facility: HOSPITAL | Age: 67
End: 2022-12-22

## 2022-12-22 VITALS
TEMPERATURE: 97.3 F | HEIGHT: 74 IN | DIASTOLIC BLOOD PRESSURE: 47 MMHG | HEART RATE: 86 BPM | BODY MASS INDEX: 27.9 KG/M2 | OXYGEN SATURATION: 100 % | SYSTOLIC BLOOD PRESSURE: 130 MMHG | WEIGHT: 217.4 LBS | RESPIRATION RATE: 17 BRPM

## 2022-12-22 DIAGNOSIS — Z98.890 HISTORY OF ABDOMINAL PARACENTESIS: ICD-10-CM

## 2022-12-22 DIAGNOSIS — K74.60 CIRRHOSIS OF LIVER WITH ASCITES, UNSPECIFIED HEPATIC CIRRHOSIS TYPE: ICD-10-CM

## 2022-12-22 DIAGNOSIS — K75.81 LIVER CIRRHOSIS SECONDARY TO NASH: ICD-10-CM

## 2022-12-22 DIAGNOSIS — K74.60 LIVER CIRRHOSIS SECONDARY TO NASH: ICD-10-CM

## 2022-12-22 DIAGNOSIS — R18.8 CIRRHOSIS OF LIVER WITH ASCITES, UNSPECIFIED HEPATIC CIRRHOSIS TYPE: ICD-10-CM

## 2022-12-22 DIAGNOSIS — K75.81 NASH (NONALCOHOLIC STEATOHEPATITIS): Primary | ICD-10-CM

## 2022-12-22 DIAGNOSIS — K74.69 OTHER CIRRHOSIS OF LIVER: Primary | ICD-10-CM

## 2022-12-22 DIAGNOSIS — K75.81 NASH (NONALCOHOLIC STEATOHEPATITIS): ICD-10-CM

## 2022-12-22 DIAGNOSIS — D53.9 MACROCYTIC ANEMIA: Primary | ICD-10-CM

## 2022-12-22 DIAGNOSIS — Z87.898 HISTORY OF GINGIVAL BLEEDING: ICD-10-CM

## 2022-12-22 LAB
ABO GROUP BLD: NORMAL
ALBUMIN SERPL-MCNC: 4 G/DL (ref 3.5–5.2)
ALBUMIN/GLOB SERPL: 1.8 G/DL
ALP SERPL-CCNC: 100 U/L (ref 39–117)
ALT SERPL W P-5'-P-CCNC: 8 U/L (ref 1–41)
ANION GAP SERPL CALCULATED.3IONS-SCNC: 11 MMOL/L (ref 5–15)
ANISOCYTOSIS BLD QL: ABNORMAL
APTT PPP: 27.5 SECONDS (ref 61–76.5)
AST SERPL-CCNC: 7 U/L (ref 1–40)
BILIRUB SERPL-MCNC: 2.8 MG/DL (ref 0–1.2)
BLD GP AB SCN SERPL QL: POSITIVE
BUN SERPL-MCNC: 33 MG/DL (ref 8–23)
BUN/CREAT SERPL: 15 (ref 7–25)
CALCIUM SPEC-SCNC: 8.7 MG/DL (ref 8.6–10.5)
CHLORIDE SERPL-SCNC: 100 MMOL/L (ref 98–107)
CO2 SERPL-SCNC: 22 MMOL/L (ref 22–29)
CREAT SERPL-MCNC: 2.2 MG/DL (ref 0.76–1.27)
DEPRECATED RDW RBC AUTO: 61.3 FL (ref 37–54)
DEPRECATED RDW RBC AUTO: 65.2 FL (ref 37–54)
EGFRCR SERPLBLD CKD-EPI 2021: 32 ML/MIN/1.73
EOSINOPHIL # BLD MANUAL: 0.03 10*3/MM3 (ref 0–0.4)
EOSINOPHIL NFR BLD MANUAL: 2 % (ref 0.3–6.2)
ERYTHROCYTE [DISTWIDTH] IN BLOOD BY AUTOMATED COUNT: 17.2 % (ref 12.3–15.4)
ERYTHROCYTE [DISTWIDTH] IN BLOOD BY AUTOMATED COUNT: 17.2 % (ref 12.3–15.4)
GLOBULIN UR ELPH-MCNC: 2.2 GM/DL
GLUCOSE SERPL-MCNC: 302 MG/DL (ref 65–99)
HCT VFR BLD AUTO: 15.1 % (ref 37.5–51)
HCT VFR BLD AUTO: 15.8 % (ref 37.5–51)
HGB BLD-MCNC: 4.7 G/DL (ref 13–17.7)
HGB BLD-MCNC: 4.9 G/DL (ref 13–17.7)
HOLD SPECIMEN: NORMAL
HOLD SPECIMEN: NORMAL
INR PPP: 1.18 (ref 0.93–1.1)
INR PPP: 1.18 (ref 0.93–1.1)
IRON 24H UR-MRATE: 73 MCG/DL (ref 59–158)
IRON SATN MFR SERPL: 39 % (ref 20–50)
LDH SERPL-CCNC: 183 U/L (ref 135–225)
LYMPHOCYTES # BLD MANUAL: 0.32 10*3/MM3 (ref 0.7–3.1)
LYMPHOCYTES NFR BLD MANUAL: 9 % (ref 5–12)
MCH RBC QN AUTO: 31.7 PG (ref 26.6–33)
MCH RBC QN AUTO: 32 PG (ref 26.6–33)
MCHC RBC AUTO-ENTMCNC: 31.2 G/DL (ref 31.5–35.7)
MCHC RBC AUTO-ENTMCNC: 31.3 G/DL (ref 31.5–35.7)
MCV RBC AUTO: 101.4 FL (ref 79–97)
MCV RBC AUTO: 102.3 FL (ref 79–97)
MONOCYTES # BLD: 0.14 10*3/MM3 (ref 0.1–0.9)
NEUTROPHILS # BLD AUTO: 1.1 10*3/MM3 (ref 1.7–7)
NEUTROPHILS NFR BLD MANUAL: 68 % (ref 42.7–76)
NEUTS BAND NFR BLD MANUAL: 1 % (ref 0–5)
NONSPECIFIC ANTIBODY: NORMAL
NRBC SPEC MANUAL: 1 /100 WBC (ref 0–0.2)
PLAT MORPH BLD: NORMAL
PLATELET # BLD AUTO: 92 10*3/MM3 (ref 140–450)
PLATELET # BLD AUTO: 93 10*3/MM3 (ref 140–450)
PMV BLD AUTO: 7.5 FL (ref 6–12)
PMV BLD AUTO: 7.5 FL (ref 6–12)
POTASSIUM SERPL-SCNC: 4 MMOL/L (ref 3.5–5.2)
PROT SERPL-MCNC: 6.2 G/DL (ref 6–8.5)
PROTHROMBIN TIME: 12 SECONDS (ref 9.6–11.7)
PROTHROMBIN TIME: 12 SECONDS (ref 9.6–11.7)
RBC # BLD AUTO: 1.49 10*6/MM3 (ref 4.14–5.8)
RBC # BLD AUTO: 1.54 10*6/MM3 (ref 4.14–5.8)
RETICS # AUTO: 0.15 10*6/MM3 (ref 0.02–0.13)
RETICS/RBC NFR AUTO: 9.78 % (ref 0.7–1.9)
RH BLD: POSITIVE
SCAN SLIDE: NORMAL
SODIUM SERPL-SCNC: 133 MMOL/L (ref 136–145)
T&S EXPIRATION DATE: NORMAL
TIBC SERPL-MCNC: 186 MCG/DL (ref 298–536)
TRANSFERRIN SERPL-MCNC: 125 MG/DL (ref 200–360)
VARIANT LYMPHS NFR BLD MANUAL: 20 % (ref 19.6–45.3)
WBC MORPH BLD: NORMAL
WBC NRBC COR # BLD: 1.6 10*3/MM3 (ref 3.4–10.8)
WBC NRBC COR # BLD: 1.9 10*3/MM3 (ref 3.4–10.8)
WHOLE BLOOD HOLD COAG: NORMAL
WHOLE BLOOD HOLD SPECIMEN: NORMAL

## 2022-12-22 PROCEDURE — 96365 THER/PROPH/DIAG IV INF INIT: CPT

## 2022-12-22 PROCEDURE — 85730 THROMBOPLASTIN TIME PARTIAL: CPT | Performed by: EMERGENCY MEDICINE

## 2022-12-22 PROCEDURE — 83615 LACTATE (LD) (LDH) ENZYME: CPT | Performed by: EMERGENCY MEDICINE

## 2022-12-22 PROCEDURE — 85025 COMPLETE CBC W/AUTO DIFF WBC: CPT | Performed by: EMERGENCY MEDICINE

## 2022-12-22 PROCEDURE — 36415 COLL VENOUS BLD VENIPUNCTURE: CPT

## 2022-12-22 PROCEDURE — 86901 BLOOD TYPING SEROLOGIC RH(D): CPT | Performed by: INTERNAL MEDICINE

## 2022-12-22 PROCEDURE — 84466 ASSAY OF TRANSFERRIN: CPT | Performed by: EMERGENCY MEDICINE

## 2022-12-22 PROCEDURE — 99285 EMERGENCY DEPT VISIT HI MDM: CPT

## 2022-12-22 PROCEDURE — 86922 COMPATIBILITY TEST ANTIGLOB: CPT

## 2022-12-22 PROCEDURE — 86870 RBC ANTIBODY IDENTIFICATION: CPT | Performed by: INTERNAL MEDICINE

## 2022-12-22 PROCEDURE — 86900 BLOOD TYPING SEROLOGIC ABO: CPT

## 2022-12-22 PROCEDURE — 74176 CT ABD & PELVIS W/O CONTRAST: CPT

## 2022-12-22 PROCEDURE — 80053 COMPREHEN METABOLIC PANEL: CPT | Performed by: EMERGENCY MEDICINE

## 2022-12-22 PROCEDURE — 85610 PROTHROMBIN TIME: CPT | Performed by: EMERGENCY MEDICINE

## 2022-12-22 PROCEDURE — G0378 HOSPITAL OBSERVATION PER HR: HCPCS

## 2022-12-22 PROCEDURE — P9047 ALBUMIN (HUMAN), 25%, 50ML: HCPCS | Performed by: INTERNAL MEDICINE

## 2022-12-22 PROCEDURE — 36430 TRANSFUSION BLD/BLD COMPNT: CPT

## 2022-12-22 PROCEDURE — 85610 PROTHROMBIN TIME: CPT | Performed by: INTERNAL MEDICINE

## 2022-12-22 PROCEDURE — 96375 TX/PRO/DX INJ NEW DRUG ADDON: CPT

## 2022-12-22 PROCEDURE — P9016 RBC LEUKOCYTES REDUCED: HCPCS

## 2022-12-22 PROCEDURE — 83540 ASSAY OF IRON: CPT | Performed by: EMERGENCY MEDICINE

## 2022-12-22 PROCEDURE — 25010000002 ALBUMIN HUMAN 25% PER 50 ML: Performed by: INTERNAL MEDICINE

## 2022-12-22 PROCEDURE — 85007 BL SMEAR W/DIFF WBC COUNT: CPT | Performed by: EMERGENCY MEDICINE

## 2022-12-22 PROCEDURE — 85027 COMPLETE CBC AUTOMATED: CPT | Performed by: INTERNAL MEDICINE

## 2022-12-22 PROCEDURE — 86850 RBC ANTIBODY SCREEN: CPT | Performed by: INTERNAL MEDICINE

## 2022-12-22 PROCEDURE — 86900 BLOOD TYPING SEROLOGIC ABO: CPT | Performed by: INTERNAL MEDICINE

## 2022-12-22 PROCEDURE — 25010000002 DIPHENHYDRAMINE PER 50 MG: Performed by: EMERGENCY MEDICINE

## 2022-12-22 PROCEDURE — 85045 AUTOMATED RETICULOCYTE COUNT: CPT | Performed by: EMERGENCY MEDICINE

## 2022-12-22 PROCEDURE — 96374 THER/PROPH/DIAG INJ IV PUSH: CPT

## 2022-12-22 PROCEDURE — 86901 BLOOD TYPING SEROLOGIC RH(D): CPT

## 2022-12-22 PROCEDURE — 76942 ECHO GUIDE FOR BIOPSY: CPT

## 2022-12-22 PROCEDURE — 96366 THER/PROPH/DIAG IV INF ADDON: CPT

## 2022-12-22 RX ORDER — DIPHENHYDRAMINE HYDROCHLORIDE 50 MG/ML
25 INJECTION INTRAMUSCULAR; INTRAVENOUS ONCE
Status: COMPLETED | OUTPATIENT
Start: 2022-12-22 | End: 2022-12-22

## 2022-12-22 RX ORDER — PANTOPRAZOLE SODIUM 40 MG/10ML
80 INJECTION, POWDER, LYOPHILIZED, FOR SOLUTION INTRAVENOUS ONCE
Status: COMPLETED | OUTPATIENT
Start: 2022-12-22 | End: 2022-12-22

## 2022-12-22 RX ORDER — SODIUM CHLORIDE 9 MG/ML
40 INJECTION, SOLUTION INTRAVENOUS AS NEEDED
Status: DISCONTINUED | OUTPATIENT
Start: 2022-12-22 | End: 2022-12-23

## 2022-12-22 RX ORDER — LIDOCAINE HYDROCHLORIDE 20 MG/ML
10 INJECTION, SOLUTION INFILTRATION; PERINEURAL AS NEEDED
Status: DISCONTINUED | OUTPATIENT
Start: 2022-12-22 | End: 2022-12-24 | Stop reason: HOSPADM

## 2022-12-22 RX ORDER — CHOLECALCIFEROL (VITAMIN D3) 125 MCG
5 CAPSULE ORAL NIGHTLY PRN
Status: DISCONTINUED | OUTPATIENT
Start: 2022-12-22 | End: 2022-12-23 | Stop reason: HOSPADM

## 2022-12-22 RX ORDER — ACETAMINOPHEN 500 MG
1000 TABLET ORAL ONCE
Status: COMPLETED | OUTPATIENT
Start: 2022-12-22 | End: 2022-12-23

## 2022-12-22 RX ORDER — ALBUMIN (HUMAN) 12.5 G/50ML
25 SOLUTION INTRAVENOUS DAILY PRN
Status: DISCONTINUED | OUTPATIENT
Start: 2022-12-22 | End: 2022-12-24 | Stop reason: HOSPADM

## 2022-12-22 RX ORDER — ACETAMINOPHEN 325 MG/1
650 TABLET ORAL EVERY 4 HOURS PRN
Status: DISCONTINUED | OUTPATIENT
Start: 2022-12-22 | End: 2022-12-23 | Stop reason: HOSPADM

## 2022-12-22 RX ORDER — SODIUM CHLORIDE 9 MG/ML
250 INJECTION, SOLUTION INTRAVENOUS AS NEEDED
Status: DISCONTINUED | OUTPATIENT
Start: 2022-12-22 | End: 2022-12-24 | Stop reason: HOSPADM

## 2022-12-22 RX ORDER — ONDANSETRON 2 MG/ML
4 INJECTION INTRAMUSCULAR; INTRAVENOUS EVERY 6 HOURS PRN
Status: DISCONTINUED | OUTPATIENT
Start: 2022-12-22 | End: 2022-12-23 | Stop reason: SDUPTHER

## 2022-12-22 RX ORDER — ACETAMINOPHEN 500 MG
1000 TABLET ORAL ONCE
Status: COMPLETED | OUTPATIENT
Start: 2022-12-22 | End: 2022-12-22

## 2022-12-22 RX ORDER — SODIUM CHLORIDE 0.9 % (FLUSH) 0.9 %
10 SYRINGE (ML) INJECTION AS NEEDED
Status: DISCONTINUED | OUTPATIENT
Start: 2022-12-22 | End: 2022-12-23 | Stop reason: HOSPADM

## 2022-12-22 RX ORDER — ALBUMIN (HUMAN) 12.5 G/50ML
12.5 SOLUTION INTRAVENOUS DAILY PRN
Status: DISCONTINUED | OUTPATIENT
Start: 2022-12-22 | End: 2022-12-24 | Stop reason: HOSPADM

## 2022-12-22 RX ORDER — DIPHENHYDRAMINE HYDROCHLORIDE 50 MG/ML
25 INJECTION INTRAMUSCULAR; INTRAVENOUS ONCE
Status: COMPLETED | OUTPATIENT
Start: 2022-12-22 | End: 2022-12-23

## 2022-12-22 RX ORDER — SODIUM CHLORIDE 0.9 % (FLUSH) 0.9 %
10 SYRINGE (ML) INJECTION EVERY 12 HOURS SCHEDULED
Status: DISCONTINUED | OUTPATIENT
Start: 2022-12-22 | End: 2022-12-23 | Stop reason: HOSPADM

## 2022-12-22 RX ADMIN — LIDOCAINE HYDROCHLORIDE 10 ML: 20 INJECTION, SOLUTION EPIDURAL; INFILTRATION; INTRACAUDAL; PERINEURAL at 12:40

## 2022-12-22 RX ADMIN — DIPHENHYDRAMINE HYDROCHLORIDE 25 MG: 50 INJECTION, SOLUTION INTRAMUSCULAR; INTRAVENOUS at 16:57

## 2022-12-22 RX ADMIN — ACETAMINOPHEN 1000 MG: 500 TABLET, FILM COATED ORAL at 16:57

## 2022-12-22 RX ADMIN — ALBUMIN (HUMAN) 75 G: 0.25 INJECTION, SOLUTION INTRAVENOUS at 13:30

## 2022-12-22 RX ADMIN — PANTOPRAZOLE SODIUM 80 MG: 40 INJECTION, POWDER, FOR SOLUTION INTRAVENOUS at 16:57

## 2022-12-22 NOTE — NURSING NOTE
Dr Park's office was notified of hgb 4.9 orders rec'd to infuse 2units PRBC and send patient to ER for evaluation.

## 2022-12-22 NOTE — ED PROVIDER NOTES
Subjective   History of Present Illness  67-year-old male with a history of Cyr presents with a low hemoglobin.  The patient was having paracentesis today when a low hemoglobin was detected.  The patient was then sent to the ER for additional evaluation.  He states that he is had a work-up for anemia in the past and was told that he had long-term, low-level bleeding in his gut.  He reports that he has had his B12 and folic checked in the past.  He reports that he has had no fever or chills.  He reports he has had dyspnea on exertion with some shortness of breath.  He reports no vomiting or diarrhea.  The patient denies nosebleeds.  He denies headache.  He denies melena hematemesis hematochezia or hematuria he reports no recent weight changes.  He is not on dialysis        Review of Systems   Constitutional: Positive for chills and fatigue. Negative for diaphoresis and fever.   Respiratory: Positive for chest tightness and shortness of breath.    Cardiovascular: Positive for palpitations.   Gastrointestinal: Positive for abdominal distention, abdominal pain and nausea. Negative for anal bleeding, blood in stool, diarrhea, rectal pain and vomiting.   Endocrine: Negative for polydipsia, polyphagia and polyuria.   Genitourinary: Positive for dysuria. Negative for flank pain and testicular pain.   Allergic/Immunologic: Negative for food allergies.   Neurological: Negative for headaches.   Hematological: Does not bruise/bleed easily.   All other systems reviewed and are negative.      Past Medical History:   Diagnosis Date   • Anemia 09/2011   • Anesthesia complication     AWAKENED EARLY, ASPIRATION PNEUMONIA   • Cancer (HCC)     KIDNEY   • Cirrhosis (HCC)    • Colon polyp    • Diabetes mellitus (HCC)    • Diverticulitis of colon    • Esophageal varices (HCC)    • Fatty liver    • Gallstones    • Hyperlipidemia    • Hypertension    • Liver disease        No Known Allergies    Past Surgical History:   Procedure Laterality  "Date   • COLONOSCOPY  approx 2018    benign polyps per pt    • ENDOSCOPY N/A 2022    Procedure: ESOPHAGOGASTRODUODENOSCOPY;  Surgeon: David Almeida MD;  Location: [facility] ENDOSCOPY;  Service: Gastroenterology;  Laterality: N/A;  pre - ruq pain, hx parikh, cirrhosis  post - hiatal hernia   • LIVER BIOPSY     • NEPHRECTOMY Right     partial    • SHOULDER SURGERY      right shoulder \"reverse\" per pt    • UPPER GASTROINTESTINAL ENDOSCOPY  approx 2018    negative per pt        Family History   Problem Relation Age of Onset   • Cirrhosis Mother    • Liver disease Sister    • Malig Hyperthermia Neg Hx        Social History     Socioeconomic History   • Marital status: Single   Tobacco Use   • Smoking status: Former     Packs/day: 1.00     Years: 15.00     Pack years: 15.00     Types: Cigarettes     Start date: 1971     Quit date: 1984     Years since quittin.6   • Smokeless tobacco: Never   • Tobacco comments:     quit 20 plus years ago    Substance and Sexual Activity   • Alcohol use: Not Currently   • Drug use: Not Currently     Types: Marijuana   • Sexual activity: Not Currently     Partners: Female           Objective   Physical Exam  Alert Purdy Coma Scale 15   HEENT: Pupils equal and reactive to light.  Mildly jaundiced conjunctivae are not injected. Normal tympanic membranes. Oropharynx and nares are normal.   Neck: Supple. Midline trachea. No JVD. No goiter.   Chest: Clear and equal breath sounds bilaterally, regular rate and rhythm without murmur or rub.   Abdomen: Positive bowel sounds, nontender, nondistended. No rebound or peritoneal signs. No CVA tenderness.  Today's paracentesis appears effective.  Reportedly heme-negative  Extremities no clubbing. cyanosis or edema. Motor sensory exam is normal. The full range of motion is intact   Skin: Warm and dry, no rashes or petechia.   Lymphatic: No regional lymphadenopathy. No calf pain, swelling or Homans sign    Procedures           ED " Course           Labs Reviewed   COMPREHENSIVE METABOLIC PANEL - Abnormal; Notable for the following components:       Result Value    Glucose 302 (*)     BUN 33 (*)     Creatinine 2.20 (*)     Sodium 133 (*)     Total Bilirubin 2.8 (*)     eGFR 32.0 (*)     All other components within normal limits    Narrative:     GFR Normal >60  Chronic Kidney Disease <60  Kidney Failure <15     PROTIME-INR - Abnormal; Notable for the following components:    Protime 12.0 (*)     INR 1.18 (*)     All other components within normal limits   APTT - Abnormal; Notable for the following components:    PTT 27.5 (*)     All other components within normal limits   RETICULOCYTES - Abnormal; Notable for the following components:    Reticulocyte % 9.78 (*)     Reticulocyte Absolute 0.1459 (*)     All other components within normal limits   IRON PROFILE - Abnormal; Notable for the following components:    Transferrin 125 (*)     TIBC 186 (*)     All other components within normal limits   LACTATE DEHYDROGENASE - Normal   RAINBOW DRAW    Narrative:     The following orders were created for panel order [facility] Draw.  Procedure                               Abnormality         Status                     ---------                               -----------         ------                     Green Top (Gel)[[id number]]                                                             Lavender Top[[id number]]                                                                Gold Top - SST[[id number]]                                                              Light Blue Top[[id number]]                                                                Please view results for these tests on the individual orders.   VITAMIN B12   FOLATE RBC   CBC WITH AUTO DIFFERENTIAL   SCAN SLIDE   PREPARE RBC   PREPARE RBC   TYPE AND SCREEN   GREEN TOP   LAVENDER TOP   GOLD TOP - SST   LIGHT BLUE TOP   CBC AND DIFFERENTIAL    Narrative:     The following orders were created for  panel order CBC & Differential.  Procedure                               Abnormality         Status                     ---------                               -----------         ------                     CBC Auto Differential[[id number]]                            In process                 Scan Slide[[id number]]                                       In process                   Please view results for these tests on the individual orders.     Medications   pantoprazole (PROTONIX) injection 80 mg (80 mg Intravenous Given 12/22/22 1657)   diphenhydrAMINE (BENADRYL) injection 25 mg (25 mg Intravenous Given 12/22/22 1657)   acetaminophen (TYLENOL) tablet 1,000 mg (1,000 mg Oral Given 12/22/22 1657)     No radiology results for the last day                                  MDM  Number of Diagnoses or Management Options     Amount and/or Complexity of Data Reviewed  Decide to obtain previous medical records or to obtain history from someone other than the patient: yes    Risk of Complications, Morbidity, and/or Mortality  Presenting problems: high  Diagnostic procedures: high  Management options: high  General comments: The patient will be transfused emergently with 2 units of blood.  Patient I will and have delayed transfusion of 1/3 unit at 10 AM we will obtain a follow-up H&H in the morning.  The patient was given Protonix in the ER.  He was agreeable with this plan of treatment.  The patient states that he wanted early discharge if possible tomorrow and indicated that he felt like a work-up could be done as an outpatient if needed, anemia follow-up studies ordered in the emergency department.  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       Medication List      No changes were made to your prescriptions during this visit.          Munir Gonzales MD  12/22/22 [id number]

## 2022-12-23 VITALS
RESPIRATION RATE: 16 BRPM | OXYGEN SATURATION: 100 % | HEART RATE: 66 BPM | TEMPERATURE: 98 F | BODY MASS INDEX: 28.7 KG/M2 | SYSTOLIC BLOOD PRESSURE: 125 MMHG | HEIGHT: 74 IN | DIASTOLIC BLOOD PRESSURE: 70 MMHG | WEIGHT: 223.6 LBS

## 2022-12-23 LAB
ANION GAP SERPL CALCULATED.3IONS-SCNC: 10 MMOL/L (ref 5–15)
BASOPHILS # BLD AUTO: 0 10*3/MM3 (ref 0–0.2)
BASOPHILS # BLD AUTO: 0 10*3/MM3 (ref 0–0.2)
BASOPHILS NFR BLD AUTO: 0.7 % (ref 0–1.5)
BASOPHILS NFR BLD AUTO: 1 % (ref 0–1.5)
BUN SERPL-MCNC: 33 MG/DL (ref 8–23)
BUN/CREAT SERPL: 15.3 (ref 7–25)
CALCIUM SPEC-SCNC: 8.1 MG/DL (ref 8.6–10.5)
CHLORIDE SERPL-SCNC: 103 MMOL/L (ref 98–107)
CO2 SERPL-SCNC: 20 MMOL/L (ref 22–29)
CREAT SERPL-MCNC: 2.16 MG/DL (ref 0.76–1.27)
DEPRECATED RDW RBC AUTO: 63 FL (ref 37–54)
DEPRECATED RDW RBC AUTO: 64.3 FL (ref 37–54)
EGFRCR SERPLBLD CKD-EPI 2021: 32.7 ML/MIN/1.73
EOSINOPHIL # BLD AUTO: 0 10*3/MM3 (ref 0–0.4)
EOSINOPHIL # BLD AUTO: 0 10*3/MM3 (ref 0–0.4)
EOSINOPHIL NFR BLD AUTO: 1.2 % (ref 0.3–6.2)
EOSINOPHIL NFR BLD AUTO: 1.5 % (ref 0.3–6.2)
ERYTHROCYTE [DISTWIDTH] IN BLOOD BY AUTOMATED COUNT: 18.4 % (ref 12.3–15.4)
ERYTHROCYTE [DISTWIDTH] IN BLOOD BY AUTOMATED COUNT: 19.1 % (ref 12.3–15.4)
GLUCOSE SERPL-MCNC: 243 MG/DL (ref 65–99)
HCT VFR BLD AUTO: 21.4 % (ref 37.5–51)
HCT VFR BLD AUTO: 26.6 % (ref 37.5–51)
HGB BLD-MCNC: 6.8 G/DL (ref 13–17.7)
HGB BLD-MCNC: 8.4 G/DL (ref 13–17.7)
LYMPHOCYTES # BLD AUTO: 0.3 10*3/MM3 (ref 0.7–3.1)
LYMPHOCYTES # BLD AUTO: 0.4 10*3/MM3 (ref 0.7–3.1)
LYMPHOCYTES NFR BLD AUTO: 15.5 % (ref 19.6–45.3)
LYMPHOCYTES NFR BLD AUTO: 16.6 % (ref 19.6–45.3)
MCH RBC QN AUTO: 30.9 PG (ref 26.6–33)
MCH RBC QN AUTO: 31.1 PG (ref 26.6–33)
MCHC RBC AUTO-ENTMCNC: 31.7 G/DL (ref 31.5–35.7)
MCHC RBC AUTO-ENTMCNC: 32 G/DL (ref 31.5–35.7)
MCV RBC AUTO: 97.3 FL (ref 79–97)
MCV RBC AUTO: 97.7 FL (ref 79–97)
MONOCYTES # BLD AUTO: 0.2 10*3/MM3 (ref 0.1–0.9)
MONOCYTES # BLD AUTO: 0.3 10*3/MM3 (ref 0.1–0.9)
MONOCYTES NFR BLD AUTO: 10.1 % (ref 5–12)
MONOCYTES NFR BLD AUTO: 13 % (ref 5–12)
NEUTROPHILS NFR BLD AUTO: 1.5 10*3/MM3 (ref 1.7–7)
NEUTROPHILS NFR BLD AUTO: 1.6 10*3/MM3 (ref 1.7–7)
NEUTROPHILS NFR BLD AUTO: 69.6 % (ref 42.7–76)
NEUTROPHILS NFR BLD AUTO: 70.8 % (ref 42.7–76)
NRBC BLD AUTO-RTO: 0.1 /100 WBC (ref 0–0.2)
NRBC BLD AUTO-RTO: 0.3 /100 WBC (ref 0–0.2)
PLATELET # BLD AUTO: 101 10*3/MM3 (ref 140–450)
PLATELET # BLD AUTO: 106 10*3/MM3 (ref 140–450)
PMV BLD AUTO: 7.3 FL (ref 6–12)
PMV BLD AUTO: 7.4 FL (ref 6–12)
POTASSIUM SERPL-SCNC: 4.3 MMOL/L (ref 3.5–5.2)
RBC # BLD AUTO: 2.19 10*6/MM3 (ref 4.14–5.8)
RBC # BLD AUTO: 2.72 10*6/MM3 (ref 4.14–5.8)
SODIUM SERPL-SCNC: 133 MMOL/L (ref 136–145)
VIT B12 BLD-MCNC: 439 PG/ML (ref 211–946)
WBC NRBC COR # BLD: 2.1 10*3/MM3 (ref 3.4–10.8)
WBC NRBC COR # BLD: 2.2 10*3/MM3 (ref 3.4–10.8)

## 2022-12-23 PROCEDURE — P9016 RBC LEUKOCYTES REDUCED: HCPCS

## 2022-12-23 PROCEDURE — G0378 HOSPITAL OBSERVATION PER HR: HCPCS

## 2022-12-23 PROCEDURE — 82747 ASSAY OF FOLIC ACID RBC: CPT | Performed by: EMERGENCY MEDICINE

## 2022-12-23 PROCEDURE — 86900 BLOOD TYPING SEROLOGIC ABO: CPT

## 2022-12-23 PROCEDURE — 25010000002 DIPHENHYDRAMINE PER 50 MG: Performed by: EMERGENCY MEDICINE

## 2022-12-23 PROCEDURE — 96376 TX/PRO/DX INJ SAME DRUG ADON: CPT

## 2022-12-23 PROCEDURE — 36430 TRANSFUSION BLD/BLD COMPNT: CPT

## 2022-12-23 PROCEDURE — 80048 BASIC METABOLIC PNL TOTAL CA: CPT | Performed by: EMERGENCY MEDICINE

## 2022-12-23 PROCEDURE — 85025 COMPLETE CBC W/AUTO DIFF WBC: CPT | Performed by: EMERGENCY MEDICINE

## 2022-12-23 PROCEDURE — 85014 HEMATOCRIT: CPT | Performed by: EMERGENCY MEDICINE

## 2022-12-23 PROCEDURE — 82607 VITAMIN B-12: CPT | Performed by: EMERGENCY MEDICINE

## 2022-12-23 RX ORDER — SODIUM CHLORIDE 0.9 % (FLUSH) 0.9 %
10 SYRINGE (ML) INJECTION AS NEEDED
Status: DISCONTINUED | OUTPATIENT
Start: 2022-12-23 | End: 2022-12-23 | Stop reason: SDUPTHER

## 2022-12-23 RX ORDER — ONDANSETRON 4 MG/1
4 TABLET, FILM COATED ORAL EVERY 6 HOURS PRN
Status: DISCONTINUED | OUTPATIENT
Start: 2022-12-23 | End: 2022-12-23 | Stop reason: HOSPADM

## 2022-12-23 RX ORDER — TERAZOSIN 5 MG/1
10 CAPSULE ORAL DAILY
Status: DISCONTINUED | OUTPATIENT
Start: 2022-12-23 | End: 2022-12-23 | Stop reason: HOSPADM

## 2022-12-23 RX ORDER — BISACODYL 10 MG
10 SUPPOSITORY, RECTAL RECTAL DAILY PRN
Status: DISCONTINUED | OUTPATIENT
Start: 2022-12-23 | End: 2022-12-23 | Stop reason: HOSPADM

## 2022-12-23 RX ORDER — SERTRALINE HYDROCHLORIDE 100 MG/1
100 TABLET, FILM COATED ORAL DAILY
Status: DISCONTINUED | OUTPATIENT
Start: 2022-12-23 | End: 2022-12-23 | Stop reason: HOSPADM

## 2022-12-23 RX ORDER — INSULIN LISPRO 100 [IU]/ML
2-9 INJECTION, SOLUTION INTRAVENOUS; SUBCUTANEOUS
Status: DISCONTINUED | OUTPATIENT
Start: 2022-12-23 | End: 2022-12-23 | Stop reason: HOSPADM

## 2022-12-23 RX ORDER — ONDANSETRON 2 MG/ML
4 INJECTION INTRAMUSCULAR; INTRAVENOUS EVERY 6 HOURS PRN
Status: DISCONTINUED | OUTPATIENT
Start: 2022-12-23 | End: 2022-12-23 | Stop reason: HOSPADM

## 2022-12-23 RX ORDER — POLYETHYLENE GLYCOL 3350 17 G/17G
17 POWDER, FOR SOLUTION ORAL DAILY PRN
Status: DISCONTINUED | OUTPATIENT
Start: 2022-12-23 | End: 2022-12-23 | Stop reason: HOSPADM

## 2022-12-23 RX ORDER — BISACODYL 5 MG/1
5 TABLET, DELAYED RELEASE ORAL DAILY PRN
Status: DISCONTINUED | OUTPATIENT
Start: 2022-12-23 | End: 2022-12-23 | Stop reason: HOSPADM

## 2022-12-23 RX ORDER — SODIUM CHLORIDE 0.9 % (FLUSH) 0.9 %
10 SYRINGE (ML) INJECTION EVERY 12 HOURS SCHEDULED
Status: DISCONTINUED | OUTPATIENT
Start: 2022-12-23 | End: 2022-12-23 | Stop reason: SDUPTHER

## 2022-12-23 RX ORDER — MONTELUKAST SODIUM 10 MG/1
10 TABLET ORAL NIGHTLY
Status: DISCONTINUED | OUTPATIENT
Start: 2022-12-23 | End: 2022-12-23 | Stop reason: HOSPADM

## 2022-12-23 RX ORDER — DEXTROSE MONOHYDRATE 25 G/50ML
25 INJECTION, SOLUTION INTRAVENOUS
Status: DISCONTINUED | OUTPATIENT
Start: 2022-12-23 | End: 2022-12-23 | Stop reason: HOSPADM

## 2022-12-23 RX ORDER — OLANZAPINE 10 MG/2ML
1 INJECTION, POWDER, LYOPHILIZED, FOR SOLUTION INTRAMUSCULAR
Status: DISCONTINUED | OUTPATIENT
Start: 2022-12-23 | End: 2022-12-23 | Stop reason: HOSPADM

## 2022-12-23 RX ORDER — PANTOPRAZOLE SODIUM 40 MG/1
40 TABLET, DELAYED RELEASE ORAL EVERY MORNING
Status: DISCONTINUED | OUTPATIENT
Start: 2022-12-23 | End: 2022-12-23 | Stop reason: HOSPADM

## 2022-12-23 RX ORDER — SODIUM CHLORIDE 9 MG/ML
40 INJECTION, SOLUTION INTRAVENOUS AS NEEDED
Status: DISCONTINUED | OUTPATIENT
Start: 2022-12-23 | End: 2022-12-23 | Stop reason: HOSPADM

## 2022-12-23 RX ORDER — NICOTINE POLACRILEX 4 MG
15 LOZENGE BUCCAL
Status: DISCONTINUED | OUTPATIENT
Start: 2022-12-23 | End: 2022-12-23 | Stop reason: HOSPADM

## 2022-12-23 RX ADMIN — DIPHENHYDRAMINE HYDROCHLORIDE 25 MG: 50 INJECTION, SOLUTION INTRAMUSCULAR; INTRAVENOUS at 00:48

## 2022-12-23 RX ADMIN — ACETAMINOPHEN 1000 MG: 500 TABLET ORAL at 00:47

## 2022-12-23 NOTE — DISCHARGE SUMMARY
"[facility]    Froylan June    CHIEF COMPLAINT:     Dyspnea     HISTORY OF PRESENT ILLNESS:    [facility]    ED 12/22/22: 67-year-old male with a history of Parikh presents with a low hemoglobin.  The patient was having paracentesis today when a low hemoglobin was detected.  The patient was then sent to the ER for additional evaluation.  He states that he is had a work-up for anemia in the past and was told that he had long-term, low-level bleeding in his gut.  He reports that he has had his B12 and folic checked in the past.  He reports that he has had no fever or chills.  He reports he has had dyspnea on exertion with some shortness of breath.  He reports no vomiting or diarrhea.  The patient denies nosebleeds.  He denies headache.  He denies melena hematemesis hematochezia or hematuria he reports no recent weight changes.  He is not on dialysis    Past Medical History:   Diagnosis Date   • Anemia 09/2011   • Anesthesia complication     AWAKENED EARLY, ASPIRATION PNEUMONIA   • Cancer (HCC)     KIDNEY   • Cirrhosis (HCC)    • Colon polyp    • Diabetes mellitus (HCC)    • Diverticulitis of colon    • Esophageal varices (HCC)    • Fatty liver    • Gallstones    • Hyperlipidemia    • Hypertension    • Liver disease      Past Surgical History:   Procedure Laterality Date   • COLONOSCOPY  approx 2018    benign polyps per pt    • ENDOSCOPY N/A 02/28/2022    Procedure: ESOPHAGOGASTRODUODENOSCOPY;  Surgeon: David Almeida MD;  Location: [facility];  Service: Gastroenterology;  Laterality: N/A;  pre - ruq pain, hx parikh, cirrhosis  post - hiatal hernia   • LIVER BIOPSY     • NEPHRECTOMY Right     partial    • SHOULDER SURGERY      right shoulder \"reverse\" per pt    • UPPER GASTROINTESTINAL ENDOSCOPY  approx 2018    negative per pt      Family History   Problem Relation Age of Onset   • Cirrhosis Mother    • Liver disease Sister    • Malig Hyperthermia Neg Hx      Social History     Tobacco Use   • " Smoking status: Former     Packs/day: 1.00     Years: 15.00     Pack years: 15.00     Types: Cigarettes     Start date: 1971     Quit date: 1984     Years since quittin.6   • Smokeless tobacco: Never   • Tobacco comments:     quit 20 plus years ago    Vaping Use   • Vaping Use: Never used   Substance Use Topics   • Alcohol use: Never   • Drug use: Not Currently     Types: Marijuana     Medications Prior to Admission   Medication Sig Dispense Refill Last Dose   • esomeprazole (nexIUM) 40 MG capsule Take 40 mg by mouth Daily.   2022 at 2100   • metFORMIN ER (GLUCOPHAGE-XR) 500 MG 24 hr tablet Take 500 mg by mouth Daily.   2022 at 2100   • montelukast (SINGULAIR) 10 MG tablet Take 10 mg by mouth every night at bedtime.   2022 at 2100   • pioglitazone (ACTOS) 15 MG tablet    2022 at 2100   • rosuvastatin (CRESTOR) 10 MG tablet Take 10 mg by mouth Daily.   2022 at 2100   • sertraline (ZOLOFT) 100 MG tablet    2022 at 2100   • terazosin (HYTRIN) 10 MG capsule Take 10 mg by mouth Daily.   2022 at 2100     Allergies:  Patient has no known allergies.    Immunization History   Administered Date(s) Administered   • COVID-19 (PFIZER) PURPLE CAP 2021, 2021, 10/02/2021   • Covid-19 (Pfizer) Gray Cap 2022   • Flu Vaccine Quad PF 6-35MO 2016, 2017, 2018, 10/22/2019   • Flu Vaccine Split Quad 10/15/2013   • FluLaval/Fluzone >6mos 2016, 2017, 2018, 10/22/2019, 2022   • Fluzone High-Dose 65+yrs 2020, 2022   • Hepatitis A 2014, 2014   • Pneumococcal Conjugate 20-Valent (PCV20) 2022   • Pneumococcal Polysaccharide (PPSV23) 2014   • Shingrix 2018, 2018   • Typhoid Inactivated 2019           REVIEW OF SYSTEMS:    Review of Systems   Constitutional: Positive for malaise/fatigue.   HENT: Negative.    Eyes: Negative.    Cardiovascular: Positive for dyspnea on exertion.    Respiratory: Negative.    Endocrine: Negative.    Hematologic/Lymphatic: Negative.    Skin: Positive for color change.   Musculoskeletal: Negative.    Gastrointestinal: Positive for bloating.   Genitourinary: Negative.    Neurological: Positive for weakness.   Psychiatric/Behavioral: Negative.    Allergic/Immunologic: Negative.        Vital Signs  Temp:  [97 °F (36.1 °C)-98.3 °F (36.8 °C)] 98 °F (36.7 °C)  Heart Rate:  [66-95] 66  Resp:  [15-25] 16  BP: ()/(26-84) 125/70          Physical Exam:  Physical Exam  Vitals and nursing note reviewed.   Constitutional:       Appearance: Normal appearance.   HENT:      Head: Normocephalic and atraumatic.      Right Ear: External ear normal.      Left Ear: External ear normal.      Nose: Nose normal.      Mouth/Throat:      Mouth: Mucous membranes are moist.      Pharynx: Oropharynx is clear.   Eyes:      Extraocular Movements: Extraocular movements intact.      Conjunctiva/sclera: Conjunctivae normal.      Pupils: Pupils are equal, round, and reactive to light.   Cardiovascular:      Rate and Rhythm: Normal rate and regular rhythm.      Pulses: Normal pulses.      Heart sounds: Normal heart sounds.   Pulmonary:      Effort: Pulmonary effort is normal.      Breath sounds: Normal breath sounds.   Abdominal:      General: Bowel sounds are normal. There is distension.      Palpations: Abdomen is soft.   Musculoskeletal:         General: Normal range of motion.      Cervical back: Normal range of motion.   Skin:     Coloration: Skin is jaundiced.   Neurological:      General: No focal deficit present.      Mental Status: He is alert and oriented to person, place, and time.   Psychiatric:         Mood and Affect: Mood normal.         Behavior: Behavior normal.         Judgment: Judgment normal.         Emotional Behavior:    WNL   Debilities:   none  Results Review:    I reviewed the patient's new clinical results.  Lab Results (most recent)     Procedure Component Value  Units Date/Time    Basic Metabolic Panel [[id number]]  (Abnormal) Collected: 12/23/22 0723    Specimen: Blood from Arm, Right Updated: 12/23/22 0805     Glucose 243 mg/dL      BUN 33 mg/dL      Creatinine 2.16 mg/dL      Sodium 133 mmol/L      Potassium 4.3 mmol/L      Chloride 103 mmol/L      CO2 20.0 mmol/L      Calcium 8.1 mg/dL      BUN/Creatinine Ratio 15.3     Anion Gap 10.0 mmol/L      eGFR 32.7 mL/min/1.73      Comment: National Kidney Foundation and American Society of Nephrology (ASN) Task Force recommended calculation based on the Chronic Kidney Disease Epidemiology Collaboration (CKD-EPI) equation refit without adjustment for race.       Narrative:      GFR Normal >60  Chronic Kidney Disease <60  Kidney Failure <15      CBC Auto Differential [[id number]]  (Abnormal) Collected: 12/23/22 0723    Specimen: Blood from Arm, Right Updated: 12/23/22 0800     WBC 2.20 10*3/mm3      RBC 2.72 10*6/mm3      Hemoglobin 8.4 g/dL      Hematocrit 26.6 %      Comment: Result checked          MCV 97.7 fL      MCH 30.9 pg      MCHC 31.7 g/dL      RDW 18.4 %      RDW-SD 63.0 fl      MPV 7.4 fL      Platelets 106 10*3/mm3      Neutrophil % 70.8 %      Lymphocyte % 16.6 %      Monocyte % 10.1 %      Eosinophil % 1.5 %      Basophil % 1.0 %      Neutrophils, Absolute 1.60 10*3/mm3      Lymphocytes, Absolute 0.40 10*3/mm3      Monocytes, Absolute 0.20 10*3/mm3      Eosinophils, Absolute 0.00 10*3/mm3      Basophils, Absolute 0.00 10*3/mm3      nRBC 0.1 /100 WBC     CBC & Differential [[id number]]  (Abnormal) Collected: 12/23/22 0001    Specimen: Blood Updated: 12/23/22 0026    Narrative:      The following orders were created for panel order CBC & Differential.  Procedure                               Abnormality         Status                     ---------                               -----------         ------                     CBC Auto Differential[[id number]]        Abnormal            Final result                 Please  view results for these tests on the individual orders.    CBC Auto Differential [[id number]]  (Abnormal) Collected: 12/23/22 0001    Specimen: Blood Updated: 12/23/22 0026     WBC 2.10 10*3/mm3      RBC 2.19 10*6/mm3      Hemoglobin 6.8 g/dL      Comment: Result checked          Hematocrit 21.4 %      Comment: Result checked          MCV 97.3 fL      Comment: Result checked          MCH 31.1 pg      MCHC 32.0 g/dL      RDW 19.1 %      RDW-SD 64.3 fl      MPV 7.3 fL      Platelets 101 10*3/mm3      Neutrophil % 69.6 %      Lymphocyte % 15.5 %      Monocyte % 13.0 %      Eosinophil % 1.2 %      Basophil % 0.7 %      Neutrophils, Absolute 1.50 10*3/mm3      Lymphocytes, Absolute 0.30 10*3/mm3      Monocytes, Absolute 0.30 10*3/mm3      Eosinophils, Absolute 0.00 10*3/mm3      Basophils, Absolute 0.00 10*3/mm3      nRBC 0.3 /100 WBC     Vitamin B12 [[id number]] Collected: 12/23/22 0001    Specimen: Blood Updated: 12/23/22 0009    Folate RBC [[id number]] Collected: 12/23/22 0001    Specimen: Blood Updated: 12/23/22 0009    Manual Differential [[id number]]  (Abnormal) Collected: 12/22/22 1630    Specimen: Blood Updated: 12/22/22 1756     Neutrophil % 68.0 %      Lymphocyte % 20.0 %      Monocyte % 9.0 %      Eosinophil % 2.0 %      Bands %  1.0 %      Neutrophils Absolute 1.10 10*3/mm3      Lymphocytes Absolute 0.32 10*3/mm3      Monocytes Absolute 0.14 10*3/mm3      Eosinophils Absolute 0.03 10*3/mm3      nRBC 1.0 /100 WBC      Anisocytosis Slight/1+     WBC Morphology Normal     Platelet Morphology Normal    CBC & Differential [[id number]]  (Abnormal) Collected: 12/22/22 1630    Specimen: Blood Updated: 12/22/22 1756    Narrative:      The following orders were created for panel order CBC & Differential.  Procedure                               Abnormality         Status                     ---------                               -----------         ------                     CBC Auto Differential[[id number]]        Abnormal             Final result               Scan Slide[[id number]]                                       Final result                 Please view results for these tests on the individual orders.    Scan Slide [[id number]] Collected: 12/22/22 1630    Specimen: Blood Updated: 12/22/22 1756     Scan Slide --     Comment: See Manual Differential Results       Reticulocytes [[id number]]  (Abnormal) Collected: 12/22/22 1630    Specimen: Blood Updated: 12/22/22 1739     Reticulocyte % 9.78 %      Reticulocyte Absolute 0.1459 10*6/mm3     Protime-INR [[id number]]  (Abnormal) Collected: 12/22/22 1630    Specimen: Blood Updated: 12/22/22 1736     Protime 12.0 Seconds      INR 1.18    aPTT [[id number]]  (Abnormal) Collected: 12/22/22 1630    Specimen: Blood Updated: 12/22/22 1736     PTT 27.5 seconds     Comprehensive Metabolic Panel [[id number]]  (Abnormal) Collected: 12/22/22 1630    Specimen: Blood Updated: 12/22/22 1720     Glucose 302 mg/dL      BUN 33 mg/dL      Creatinine 2.20 mg/dL      Sodium 133 mmol/L      Potassium 4.0 mmol/L      Chloride 100 mmol/L      CO2 22.0 mmol/L      Calcium 8.7 mg/dL      Total Protein 6.2 g/dL      Albumin 4.00 g/dL      ALT (SGPT) 8 U/L      AST (SGOT) 7 U/L      Alkaline Phosphatase 100 U/L      Total Bilirubin 2.8 mg/dL      Globulin 2.2 gm/dL      A/G Ratio 1.8 g/dL      BUN/Creatinine Ratio 15.0     Anion Gap 11.0 mmol/L      eGFR 32.0 mL/min/1.73      Comment: National Kidney Foundation and American Society of Nephrology (ASN) Task Force recommended calculation based on the Chronic Kidney Disease Epidemiology Collaboration (CKD-EPI) equation refit without adjustment for race.       Narrative:      GFR Normal >60  Chronic Kidney Disease <60  Kidney Failure <15      Lactate Dehydrogenase [[id number]]  (Normal) Collected: 12/22/22 1630    Specimen: Blood Updated: 12/22/22 1710      U/L     Iron Profile [[id number]]  (Abnormal) Collected: 12/22/22 1630    Specimen: Blood Updated: 12/22/22  1710     Iron 73 mcg/dL      Iron Saturation 39 %      Transferrin 125 mg/dL      TIBC 186 mcg/dL           Imaging Results (Most Recent)     Procedure Component Value Units Date/Time    CT Abdomen Pelvis Without Contrast [[id number]] Collected: 12/22/22 1946     Updated: 12/22/22 1952    Narrative:      CT ABDOMEN PELVIS WO CONTRAST-     Date of Exam: 12/22/2022 7:20 PM     Indication: pelvic pain; D53.9-Nutritional anemia, unspecified;  K75.81-Nonalcoholic steatohepatitis (PERALES); K74.60-Unspecified cirrhosis  of liver; Z98.890-Other specified postprocedural states;  Z87.898-Personal history of other specified conditions.     Comparison: 08/11/2022     Technique: Contiguous axial CT images were obtained from the lung bases  to the pubic symphysis without contrast. Sagittal and coronal  reconstructions were performed.  Automated exposure control and  iterative reconstruction methods were used.     FINDINGS:     There is a moderate sized left pleural effusion. There is left basilar  atelectasis the right lung is clear.     There is cirrhotic morphology of the liver. There is diffuse ascites.  There are multiple gallstones. The spleen is enlarged. There are splenic  varices. There is a right renal cystic disease. There is mild  right-sided hydronephrosis and hydroureter secondary to a faint 2 mm  density in the distal right ureter. The left kidney is normal. The  bilateral adrenal glands are normal. Pancreas is normal.     There is diffuse ascites primarily in the pelvis. The abdominal and  pelvic portions of the GI tract are normal.       Impression:      New right-sided hydroureteronephrosis, mild and felt to be secondary to  a faint 2 mm stone in the distal right ureter. Liver morphology of  cirrhosis, diffuse ascites and splenomegaly are stable findings. There  is a new left pleural effusion.           Electronically Signed By-Gage Jean Baptiste MD On:12/22/2022 7:50 PM  This report was finalized on [id number] by   Gage Jean Baptiste MD.        reviewed    ECG/EMG Results (most recent)     None        reviewed        Results for orders placed during the hospital encounter of 08/11/22    Adult Transthoracic Echo Complete W/ Cont if Necessary Per Protocol    Interpretation Summary  · Left ventricular systolic function is normal.  · Left ventricular ejection fraction is 55 to 60%  · Left ventricular wall thickness is consistent with mild concentric hypertrophy.  · Left ventricular diastolic function is consistent with (grade I) impaired relaxation.      Microbiology Results (last 10 days)     ** No results found for the last 240 hours. **          Assessment & Plan     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)     Liver cirrhosis secondary to PERALES  -ALT 80, AST 7, sodium 133  -BUN 33, creatinine 2.16, potassium 4.3  -Paracentesis performed on 12/22/2022  -CT on pelvis shows new right-sided hydro urine nephrosis mild secondary to faint 2 mm stone distal right ureter, liver morphology of cirrhosis diffuse ascites and splenomegaly  -Follow-up with [facility] next month  -Follow-up with GI on Wednesday    Macrocytic anemia  -Iron 73, iron saturation 39, transferrin 125, TIBC 186  -Admission hemoglobin 4.7, discharge hemoglobin 8.4  -Hematocrit 26.6, RDW 18.4, RBC 2.72  -2 PRBC    Generalized anxiety disorder  - Continue sertaline  - No signs of HI/SI    Diabetes mellitus type II  -Hold p.o. metformin and Actos  -Monitor blood glucose before meals and at bedtime  -Sliding scale insulin initiated  -Last glucose 243    GERD  -Continue PPI    I discussed the patients findings and my recommendations with patient and family.     Discharge Diagnosis:      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)      Hospital Course  Patient is a 67 y.o. male presented with weakness and low hemoglobin.  Patient was in IR for outpatient paracentesis procedure.  Patient history of liver cirrhosis with PERALES.  No complications during procedure but hemoglobin found to be  "4.7 and 2 units of PRBC ordered and transferred to ED.  Patient reports history of anemia but denies melena, hemoptysis, hematochezia or hematuria.  Patient reported shortness of breath yesterday but denies chest pain, dyspnea, fever, nausea, vomiting or abdominal pain today upon discharge.  Patient given 2 units of PRBC without complications.  Patient given IV Protonix in ED.  Anemia work-up complete and hemoglobin 8.4 upon discharge.  Patient states he has EGD procedure outpatient this coming Wednesday with GI.  Patient to follow-up with them as scheduled.  Patient to have outpatient appointment with [facility] for liver transplant in January.  Testing recommendations reviewed with patient he agrees with treatment plan.  If symptoms worsen patient to call 911 or go to the ED.    Past Medical History:     Past Medical History:   Diagnosis Date   • Anemia 09/2011   • Anesthesia complication     AWAKENED EARLY, ASPIRATION PNEUMONIA   • Cancer (HCC)     KIDNEY   • Cirrhosis (HCC)    • Colon polyp    • Diabetes mellitus (HCC)    • Diverticulitis of colon    • Esophageal varices (HCC)    • Fatty liver    • Gallstones    • Hyperlipidemia    • Hypertension    • Liver disease        Past Surgical History:     Past Surgical History:   Procedure Laterality Date   • COLONOSCOPY  approx 2018    benign polyps per pt    • ENDOSCOPY N/A 02/28/2022    Procedure: ESOPHAGOGASTRODUODENOSCOPY;  Surgeon: David Almeida MD;  Location[facility];  Service: Gastroenterology;  Laterality: N/A;  pre - ruq pain, hx parikh, cirrhosis  post - hiatal hernia   • LIVER BIOPSY     • NEPHRECTOMY Right     partial    • SHOULDER SURGERY      right shoulder \"reverse\" per pt    • UPPER GASTROINTESTINAL ENDOSCOPY  approx 2018    negative per pt        Social History:   Social History     Socioeconomic History   • Marital status: Single   Tobacco Use   • Smoking status: Former     Packs/day: 1.00     Years: 15.00     Pack years: 15.00     " Types: Cigarettes     Start date: 1971     Quit date: 1984     Years since quittin.6   • Smokeless tobacco: Never   • Tobacco comments:     quit 20 plus years ago    Vaping Use   • Vaping Use: Never used   Substance and Sexual Activity   • Alcohol use: Never   • Drug use: Not Currently     Types: Marijuana   • Sexual activity: Defer     Partners: Female       Procedures Performed         Consults:   Consults     No orders found for last 30 day(s).          Condition on Discharge:     Stable    Discharge Disposition  Home or Self Care    Discharge Medications     Discharge Medications      Continue These Medications      Instructions Start Date   esomeprazole 40 MG capsule  Commonly known as: nexIUM   40 mg, Oral, Daily      metFORMIN  MG 24 hr tablet  Commonly known as: GLUCOPHAGE-XR   500 mg, Oral, Daily      montelukast 10 MG tablet  Commonly known as: SINGULAIR   10 mg, Oral, Every Night at Bedtime      pioglitazone 15 MG tablet  Commonly known as: ACTOS   No dose, route, or frequency recorded.      rosuvastatin 10 MG tablet  Commonly known as: CRESTOR   10 mg, Oral, Daily      sertraline 100 MG tablet  Commonly known as: ZOLOFT   No dose, route, or frequency recorded.      terazosin 10 MG capsule  Commonly known as: HYTRIN   10 mg, Oral, Daily             Discharge Diet:   Diet Instructions     Diet: Consistent Carbohydrate, Cardiac      Discharge Diet:  Consistent Carbohydrate  Cardiac             Activity at Discharge:   Activity Instructions     Activity as Tolerated      Measure Blood Pressure            Follow-up Appointments  Future Appointments   Date Time Provider Department Center   1/3/2023  8:00 AM ROOM [address] [place] [address] PROC [facility]  [facility] None   2023 11:45 AM ROOM 09 [facility]  [facility] None     Additional Instructions for the Follow-ups that You Need to Schedule     Discharge Follow-up with PCP   As directed       Currently Documented PCP:    Froylan June    PCP Phone  Number:    [phone]     Follow Up Details: 7-10 days         CBC & Differential    Dec 27, 2022 (Approximate)      Manual Differential: No    Release to patient: Routine Release               Test Results Pending at Discharge  Pending Labs     Order Current Status    Folate RBC In process    Vitamin B12 In process           Risk for Readmission (LACE) Score: 4 (12/23/2022  6:00 AM)          DONALD Bustamante  12/23/22  09:49 EST

## 2022-12-23 NOTE — CASE MANAGEMENT/SOCIAL WORK
Discharge Planning Assessment  [facility]     Patient Name: Lei Mercado  MRN: [id number]  Today's Date: 12/22/2022    Admit Date: 12/22/2022    Plan: Return home alone   Discharge Needs Assessment     Row Name 12/22/22 1904       Living Environment    People in Home alone    Current Living Arrangements home    Primary Care Provided by self    Provides Primary Care For no one    Family Caregiver if Needed sibling(s)    Quality of Family Relationships supportive    Able to Return to Prior Arrangements yes       Resource/Environmental Concerns    Resource/Environmental Concerns none    Transportation Concerns none       Transition Planning    Patient/Family Anticipates Transition to home    Patient/Family Anticipated Services at Transition none    Transportation Anticipated car, drives self       Discharge Needs Assessment    Equipment Currently Used at Home other (see comments)  dexcom glucose monitor    Concerns to be Addressed denies needs/concerns at this time    Anticipated Changes Related to Illness none    Equipment Needed After Discharge none    Current Discharge Risk lives alone;chronically ill               Discharge Plan     Row Name 12/22/22 1905       Plan    Plan Return home alone    Patient/Family in Agreement with Plan yes    Plan Comments Pt confirms pcp and pharmacy.He denies problems obtaining medications.He reports he is IADLs. He intends to drive self home at dc and denies dc needs. Voices happiness that he has appointment at  in January to meet with transplant (liver) team.              Continued Care and Services - Admitted Since 12/22/2022    Coordination has not been started for this encounter.          Demographic Summary     Row Name 12/22/22 1904       General Information    Admission Type observation    Arrived From home    Required Notices Provided Observation Status Notice    Referral Source admission list    Reason for Consult discharge planning    Preferred Language English        Contact Information    Permission Granted to Share Info With                Functional Status     Row Name 12/22/22 1904       Functional Status    Usual Activity Tolerance good    Current Activity Tolerance moderate       Functional Status, IADL    Medications independent    Meal Preparation independent    Housekeeping independent    Laundry independent    Shopping independent                   Patient Forms     Row Name 12/22/22 1907       Patient Forms    Important Message from Medicare ([facility]) --  DE JESUS 12/22/22 per registration    Patient Observation Letter Delivered  DE JESUS 12/22/22 per registration    Delivered to Patient  DE JESUS given and explained to patient who verbalized understanding/consent    Method of delivery In person              Yanni Del Real RN, [facility]  Office: [phone]  Fax: [phone]  [email or web address]      I met with patient in room wearing PPE: mask and goggles.     Maintained distance greater than six feet and spent </=15 minutes in the room      Yanni Del Real RN

## 2022-12-23 NOTE — CONSULTS
"Diabetes Education  Assessment/Teaching    Patient Name:  Lei Mercado  YOB: 1955  MRN: [id number]  Admit Date:  12/22/2022      Assessment Date:  12/23/2022  Flowsheet Row Most Recent Value   General Information     Referral From: Blood glucose  [Admission blood sugar 302.]   Height 188 cm (74\")   Height Method Stated   Weight 101 kg (223 lb 9.6 oz)   Weight Method Standing scale   Pregnancy Assessment    Diabetes History    What type of diabetes do you have? Type 2   Current DM knowledge fair   Do you test your blood sugar at home? yes   Frequency of checks as often as needs   Meter type Freestyle Shay 2   Who performs the test? patient   Typical readings 200s   Have you had high blood sugar? (>140mg/dl) yes   How often do you have high blood sugar? frequently   When was your last high blood sugar? Admission blood sugar 302   Education Preferences    What areas of diabetes would you like to learn about? avoiding high blood sugar, medications for diabetes   Nutrition Information    Assessment Topics    Taking Medication - Assessment Needs education   Problem Solving - Assessment Needs education   DM Goals    Taking Medication - Goal Today   Problem Solving - Goal Today          Flowsheet Row Most Recent Value   DM Education Needs    Meter Has own   Meter Type Freestyle   Medication Oral  [At home patient stated he takes Metformin 500 mg daily and Actos 15 mg daily but didn't take for the last 1 1/2 days.]   Problem Solving Hyperglycemia, Signs, Symptoms, Treatment   Discharge Plan Home   Motivation Moderate   Teaching Method Discussion   Patient Response Verbalized understanding            Other Comments:  Called patient's room with no answer and then called patient's cell phone number with patient answering. Patient stated he was taking 4 Metformin a day but had to reduce it due to severe diarrhea and has only been taking 1 a day. Explained to patient that diarrhea is a side effect of Metformin " and to discuss with PCP about discontinuing Metformin and finding other options like Glipizide or other oral meds that insurance will cover. Patient stated his PCP just retired but he has a new PCP and will discuss with him about changing meds. Patient is being discharged and has no further questions or concerns about diabetes at this timel        Electronically signed by:  Dianne Lutz RN  12/23/22 11:10 EST

## 2022-12-23 NOTE — PROCEDURES
"Therapeutic Bedside Paracentesis Without  Radiology    Date/Time: 12/22/2022 12:40 PM  Performed by: Andrew Mon APRN  Authorized by: Kevin Park MD   Consent: Written consent obtained.  Risks and benefits: risks, benefits and alternatives were discussed  Consent given by: patient  Patient understanding: patient states understanding of the procedure being performed  Patient consent: the patient's understanding of the procedure matches consent given  Procedure consent: procedure consent matches procedure scheduled  Relevant documents: relevant documents present and verified  Test results: test results available and properly labeled  Site marked: the operative site was marked  Imaging studies: imaging studies available  Required items: required blood products, implants, devices, and special equipment available  Patient identity confirmed: verbally with patient and provided demographic data  Time out: Immediately prior to procedure a \"time out\" was called to verify the correct patient, procedure, equipment, support staff and site/side marked as required.  Initial or subsequent exam: subsequent  Procedure purpose: therapeutic  Indications: abdominal discomfort secondary to ascites  Anesthesia: local infiltration    Anesthesia:  Local Anesthetic: lidocaine 1% without epinephrine  Anesthetic total: 10 mL    Sedation:  Patient sedated: no    Preparation: Patient was prepped and draped in the usual sterile fashion (All five maximal sterile barriers used- gloves, gown, cap, mask and large sterile sheet).  Needle gauge: 20  Ultrasound guidance: yes  Puncture site: right lower quadrant  Aspirate amount (ml): 70871.  Fluid appearance: serous and clear (yellow)  Dressing: 4x4 sterile gauze (adhesive bandage)  Patient tolerance: patient tolerated the procedure well with no immediate complications  Comments: Of note, there was a very small amount of blood noted when puncturing the peritoneal wall with the anesthetic " needle, but no bleeding noted in peritoneal fluid.    Subsequently, patient had no issues during his procedure but had labs sent before discharge and was found to have a low hemoglobin which was called to Dr. Park, who ordered 2 units of PRBCs and transfer to ED for evaluation.        Electronically signed by DONALD Miles, 12/22/22, 11:07 PM EST.

## 2022-12-23 NOTE — PLAN OF CARE
Problem: Adult Inpatient Plan of Care  Goal: Absence of Hospital-Acquired Illness or Injury  Intervention: Identify and Manage Fall Risk  Recent Flowsheet Documentation  Taken 12/23/2022 0000 by Khushi Merritt, RN  Safety Promotion/Fall Prevention: safety round/check completed  Taken 12/22/2022 2230 by Khushi Merritt, RN  Safety Promotion/Fall Prevention: safety round/check completed  Intervention: Prevent Skin Injury  Recent Flowsheet Documentation  Taken 12/22/2022 2230 by Khushi Merritt, RN  Body Position: supine  Skin Protection: adhesive use limited  Intervention: Prevent and Manage VTE (Venous Thromboembolism) Risk  Recent Flowsheet Documentation  Taken 12/22/2022 2230 by Khushi Merritt, RN  Activity Management:   activity adjusted per tolerance   activity minimized  Goal: Optimal Comfort and Wellbeing  Intervention: Provide Person-Centered Care  Recent Flowsheet Documentation  Taken 12/22/2022 2230 by Khushi Merritt, RN  Trust Relationship/Rapport:   care explained   choices provided   emotional support provided   empathic listening provided   questions answered   questions encouraged   reassurance provided   thoughts/feelings acknowledged  Goal: Readiness for Transition of Care  Intervention: Mutually Develop Transition Plan  Recent Flowsheet Documentation  Taken 12/22/2022 2334 by Khushi Merritt, RN  Transportation Anticipated: car, drives self  Transportation Concerns: none  Patient/Family Anticipated Services at Transition: none  Patient/Family Anticipates Transition to: home  Taken 12/22/2022 2325 by Khushi Merritt, RN  Equipment Currently Used at Home: none     Problem: Diabetes Comorbidity  Goal: Blood Glucose Level Within Targeted Range  Intervention: Monitor and Manage Glycemia  Recent Flowsheet Documentation  Taken 12/22/2022 2230 by Khushi Merritt, RN  Glycemic Management:   blood glucose monitored   oral hydration promoted     Problem: Hypertension Comorbidity  Goal: Blood  Pressure in Desired Range  Intervention: Maintain Blood Pressure Management  Recent Flowsheet Documentation  Taken 12/22/2022 2230 by Khushi Merritt, RN  Syncope Management:   head lowered   position changed slowly   Goal Outcome Evaluation:      New admit. Pt receiving blood transfusion and h&h will be rechecked this AM.

## 2022-12-23 NOTE — SIGNIFICANT NOTE
Unable to print blood  ticket due to being ordered as an emergent transfusion. Spoke with blood bank- per blood bank transfusion is no longer emergent, ordered corrected in Epic to change from emergent to routine transfusion.

## 2022-12-24 LAB
BH BB BLOOD EXPIRATION DATE: NORMAL
BH BB BLOOD TYPE BARCODE: 6200
BH BB DISPENSE STATUS: NORMAL
BH BB PRODUCT CODE: NORMAL
BH BB UNIT NUMBER: NORMAL
CROSSMATCH INTERPRETATION: NORMAL
UNIT  ABO: NORMAL
UNIT  RH: NORMAL

## 2022-12-26 ENCOUNTER — HOSPITAL ENCOUNTER (INPATIENT)
Facility: HOSPITAL | Age: 67
LOS: 1 days | Discharge: HOME | DRG: 690 | End: 2022-12-29
Attending: EMERGENCY MEDICINE | Admitting: EMERGENCY MEDICINE
Payer: MEDICARE

## 2022-12-26 ENCOUNTER — APPOINTMENT (OUTPATIENT)
Dept: CT IMAGING | Facility: HOSPITAL | Age: 67
DRG: 690 | End: 2022-12-26
Payer: MEDICARE

## 2022-12-26 DIAGNOSIS — D64.9 ANEMIA, UNSPECIFIED TYPE: ICD-10-CM

## 2022-12-26 DIAGNOSIS — R31.9 HEMATURIA, UNSPECIFIED TYPE: Primary | ICD-10-CM

## 2022-12-26 DIAGNOSIS — R10.9 RIGHT FLANK PAIN: ICD-10-CM

## 2022-12-26 DIAGNOSIS — K75.81 LIVER CIRRHOSIS SECONDARY TO NASH: ICD-10-CM

## 2022-12-26 DIAGNOSIS — K74.60 LIVER CIRRHOSIS SECONDARY TO NASH: ICD-10-CM

## 2022-12-26 LAB
ABO GROUP BLD: NORMAL
ALBUMIN SERPL-MCNC: 3.3 G/DL (ref 3.5–5.2)
ALBUMIN/GLOB SERPL: 1.1 G/DL
ALP SERPL-CCNC: 126 U/L (ref 39–117)
ALT SERPL W P-5'-P-CCNC: 7 U/L (ref 1–41)
ANION GAP SERPL CALCULATED.3IONS-SCNC: 11 MMOL/L (ref 5–15)
APTT PPP: 34.5 SECONDS (ref 61–76.5)
AST SERPL-CCNC: 10 U/L (ref 1–40)
BACTERIA UR QL AUTO: ABNORMAL /HPF
BASOPHILS # BLD AUTO: 0 10*3/MM3 (ref 0–0.2)
BASOPHILS NFR BLD AUTO: 0.6 % (ref 0–1.5)
BILIRUB SERPL-MCNC: 5 MG/DL (ref 0–1.2)
BILIRUB UR QL STRIP: ABNORMAL
BLD GP AB SCN SERPL QL: POSITIVE
BUN SERPL-MCNC: 51 MG/DL (ref 8–23)
BUN/CREAT SERPL: 23.6 (ref 7–25)
CALCIUM SPEC-SCNC: 8.7 MG/DL (ref 8.6–10.5)
CHLORIDE SERPL-SCNC: 98 MMOL/L (ref 98–107)
CLARITY UR: ABNORMAL
CO2 SERPL-SCNC: 21 MMOL/L (ref 22–29)
COLOR UR: YELLOW
CREAT SERPL-MCNC: 2.16 MG/DL (ref 0.76–1.27)
DEPRECATED RDW RBC AUTO: 57.8 FL (ref 37–54)
EGFRCR SERPLBLD CKD-EPI 2021: 32.7 ML/MIN/1.73
EOSINOPHIL # BLD AUTO: 0 10*3/MM3 (ref 0–0.4)
EOSINOPHIL NFR BLD AUTO: 0.7 % (ref 0.3–6.2)
ERYTHROCYTE [DISTWIDTH] IN BLOOD BY AUTOMATED COUNT: 16.4 % (ref 12.3–15.4)
FOLATE BLD-MCNC: 298 NG/ML
FOLATE RBC-MCNC: 1440 NG/ML
GLOBULIN UR ELPH-MCNC: 3 GM/DL
GLUCOSE BLDC GLUCOMTR-MCNC: 195 MG/DL (ref 70–105)
GLUCOSE SERPL-MCNC: 441 MG/DL (ref 65–99)
GLUCOSE UR STRIP-MCNC: ABNORMAL MG/DL
HCT VFR BLD AUTO: 20.7 % (ref 37.5–51)
HCT VFR BLD AUTO: 24 % (ref 37.5–51)
HGB BLD-MCNC: 7.5 G/DL (ref 13–17.7)
HGB UR QL STRIP.AUTO: ABNORMAL
HYALINE CASTS UR QL AUTO: ABNORMAL /LPF
INR PPP: 1.14 (ref 0.93–1.1)
KETONES UR QL STRIP: NEGATIVE
LEUKOCYTE ESTERASE UR QL STRIP.AUTO: NEGATIVE
LIPASE SERPL-CCNC: 49 U/L (ref 13–60)
LYMPHOCYTES # BLD AUTO: 0.3 10*3/MM3 (ref 0.7–3.1)
LYMPHOCYTES NFR BLD AUTO: 8.2 % (ref 19.6–45.3)
M ANTIGEN: POSITIVE
MCH RBC QN AUTO: 31.2 PG (ref 26.6–33)
MCHC RBC AUTO-ENTMCNC: 31.3 G/DL (ref 31.5–35.7)
MCV RBC AUTO: 99.6 FL (ref 79–97)
MONOCYTES # BLD AUTO: 0.2 10*3/MM3 (ref 0.1–0.9)
MONOCYTES NFR BLD AUTO: 6.1 % (ref 5–12)
NEUTROPHILS NFR BLD AUTO: 3.1 10*3/MM3 (ref 1.7–7)
NEUTROPHILS NFR BLD AUTO: 84.4 % (ref 42.7–76)
NITRITE UR QL STRIP: NEGATIVE
NONSPECIFIC ANTIBODY: NORMAL
NRBC BLD AUTO-RTO: 0 /100 WBC (ref 0–0.2)
PH UR STRIP.AUTO: 5.5 [PH] (ref 5–8)
PLATELET # BLD AUTO: 120 10*3/MM3 (ref 140–450)
PMV BLD AUTO: 7.5 FL (ref 6–12)
POTASSIUM SERPL-SCNC: 4.1 MMOL/L (ref 3.5–5.2)
PROT SERPL-MCNC: 6.3 G/DL (ref 6–8.5)
PROT UR QL STRIP: ABNORMAL
PROTHROMBIN TIME: 11.7 SECONDS (ref 9.6–11.7)
RBC # BLD AUTO: 2.41 10*6/MM3 (ref 4.14–5.8)
RBC # UR STRIP: ABNORMAL /HPF
REF LAB TEST METHOD: ABNORMAL
RH BLD: POSITIVE
SODIUM SERPL-SCNC: 130 MMOL/L (ref 136–145)
SP GR UR STRIP: 1.02 (ref 1–1.03)
SQUAMOUS #/AREA URNS HPF: ABNORMAL /HPF
T&S EXPIRATION DATE: NORMAL
UROBILINOGEN UR QL STRIP: ABNORMAL
WBC # UR STRIP: ABNORMAL /HPF
WBC NRBC COR # BLD: 3.7 10*3/MM3 (ref 3.4–10.8)

## 2022-12-26 PROCEDURE — 63710000001 INSULIN REGULAR HUMAN PER 5 UNITS

## 2022-12-26 PROCEDURE — 85025 COMPLETE CBC W/AUTO DIFF WBC: CPT

## 2022-12-26 PROCEDURE — 63710000001 INSULIN LISPRO (HUMAN) PER 5 UNITS: Performed by: NURSE PRACTITIONER

## 2022-12-26 PROCEDURE — G0378 HOSPITAL OBSERVATION PER HR: HCPCS

## 2022-12-26 PROCEDURE — 25010000002 ONDANSETRON PER 1 MG: Performed by: NURSE PRACTITIONER

## 2022-12-26 PROCEDURE — 85730 THROMBOPLASTIN TIME PARTIAL: CPT

## 2022-12-26 PROCEDURE — 25010000002 MORPHINE PER 10 MG

## 2022-12-26 PROCEDURE — 36415 COLL VENOUS BLD VENIPUNCTURE: CPT | Performed by: EMERGENCY MEDICINE

## 2022-12-26 PROCEDURE — 86905 BLOOD TYPING RBC ANTIGENS: CPT

## 2022-12-26 PROCEDURE — 86922 COMPATIBILITY TEST ANTIGLOB: CPT

## 2022-12-26 PROCEDURE — 86850 RBC ANTIBODY SCREEN: CPT

## 2022-12-26 PROCEDURE — 83690 ASSAY OF LIPASE: CPT

## 2022-12-26 PROCEDURE — 74176 CT ABD & PELVIS W/O CONTRAST: CPT

## 2022-12-26 PROCEDURE — 86901 BLOOD TYPING SEROLOGIC RH(D): CPT

## 2022-12-26 PROCEDURE — 25010000002 ONDANSETRON PER 1 MG

## 2022-12-26 PROCEDURE — 86870 RBC ANTIBODY IDENTIFICATION: CPT

## 2022-12-26 PROCEDURE — 85610 PROTHROMBIN TIME: CPT

## 2022-12-26 PROCEDURE — 80053 COMPREHEN METABOLIC PANEL: CPT

## 2022-12-26 PROCEDURE — 87086 URINE CULTURE/COLONY COUNT: CPT

## 2022-12-26 PROCEDURE — 83036 HEMOGLOBIN GLYCOSYLATED A1C: CPT | Performed by: NURSE PRACTITIONER

## 2022-12-26 PROCEDURE — 99285 EMERGENCY DEPT VISIT HI MDM: CPT

## 2022-12-26 PROCEDURE — 25010000002 CEFTRIAXONE PER 250 MG

## 2022-12-26 PROCEDURE — 86900 BLOOD TYPING SEROLOGIC ABO: CPT

## 2022-12-26 PROCEDURE — 81001 URINALYSIS AUTO W/SCOPE: CPT

## 2022-12-26 PROCEDURE — 82962 GLUCOSE BLOOD TEST: CPT

## 2022-12-26 RX ORDER — SODIUM CHLORIDE 450 MG/100ML
100 INJECTION, SOLUTION INTRAVENOUS CONTINUOUS
Status: DISCONTINUED | OUTPATIENT
Start: 2022-12-26 | End: 2022-12-29 | Stop reason: HOSPADM

## 2022-12-26 RX ORDER — SERTRALINE HYDROCHLORIDE 100 MG/1
100 TABLET, FILM COATED ORAL DAILY
Status: DISCONTINUED | OUTPATIENT
Start: 2022-12-26 | End: 2022-12-28

## 2022-12-26 RX ORDER — POLYETHYLENE GLYCOL 3350 17 G/17G
17 POWDER, FOR SOLUTION ORAL DAILY PRN
Status: DISCONTINUED | OUTPATIENT
Start: 2022-12-26 | End: 2022-12-29 | Stop reason: HOSPADM

## 2022-12-26 RX ORDER — SODIUM CHLORIDE 0.9 % (FLUSH) 0.9 %
10 SYRINGE (ML) INJECTION EVERY 12 HOURS SCHEDULED
Status: DISCONTINUED | OUTPATIENT
Start: 2022-12-26 | End: 2022-12-29 | Stop reason: HOSPADM

## 2022-12-26 RX ORDER — NICOTINE POLACRILEX 4 MG
15 LOZENGE BUCCAL
Status: DISCONTINUED | OUTPATIENT
Start: 2022-12-26 | End: 2022-12-29 | Stop reason: HOSPADM

## 2022-12-26 RX ORDER — TERAZOSIN 5 MG/1
10 CAPSULE ORAL DAILY
Status: DISCONTINUED | OUTPATIENT
Start: 2022-12-26 | End: 2022-12-26

## 2022-12-26 RX ORDER — TAMSULOSIN HYDROCHLORIDE 0.4 MG/1
0.4 CAPSULE ORAL DAILY
Status: COMPLETED | OUTPATIENT
Start: 2022-12-26 | End: 2022-12-29

## 2022-12-26 RX ORDER — ROSUVASTATIN CALCIUM 10 MG/1
10 TABLET, COATED ORAL DAILY
Status: DISCONTINUED | OUTPATIENT
Start: 2022-12-26 | End: 2022-12-29 | Stop reason: HOSPADM

## 2022-12-26 RX ORDER — SODIUM CHLORIDE 9 MG/ML
40 INJECTION, SOLUTION INTRAVENOUS AS NEEDED
Status: DISCONTINUED | OUTPATIENT
Start: 2022-12-26 | End: 2022-12-29 | Stop reason: HOSPADM

## 2022-12-26 RX ORDER — ONDANSETRON 2 MG/ML
4 INJECTION INTRAMUSCULAR; INTRAVENOUS ONCE
Status: COMPLETED | OUTPATIENT
Start: 2022-12-26 | End: 2022-12-26

## 2022-12-26 RX ORDER — ONDANSETRON 2 MG/ML
4 INJECTION INTRAMUSCULAR; INTRAVENOUS EVERY 6 HOURS PRN
Status: DISCONTINUED | OUTPATIENT
Start: 2022-12-26 | End: 2022-12-29 | Stop reason: HOSPADM

## 2022-12-26 RX ORDER — MONTELUKAST SODIUM 10 MG/1
10 TABLET ORAL DAILY
Status: DISCONTINUED | OUTPATIENT
Start: 2022-12-26 | End: 2022-12-29 | Stop reason: HOSPADM

## 2022-12-26 RX ORDER — DEXTROSE MONOHYDRATE 25 G/50ML
25 INJECTION, SOLUTION INTRAVENOUS
Status: DISCONTINUED | OUTPATIENT
Start: 2022-12-26 | End: 2022-12-29 | Stop reason: HOSPADM

## 2022-12-26 RX ORDER — SODIUM CHLORIDE 0.9 % (FLUSH) 0.9 %
10 SYRINGE (ML) INJECTION AS NEEDED
Status: DISCONTINUED | OUTPATIENT
Start: 2022-12-26 | End: 2022-12-29 | Stop reason: HOSPADM

## 2022-12-26 RX ORDER — OLANZAPINE 10 MG/2ML
1 INJECTION, POWDER, LYOPHILIZED, FOR SOLUTION INTRAMUSCULAR
Status: DISCONTINUED | OUTPATIENT
Start: 2022-12-26 | End: 2022-12-29 | Stop reason: HOSPADM

## 2022-12-26 RX ORDER — ONDANSETRON 4 MG/1
4 TABLET, FILM COATED ORAL EVERY 6 HOURS PRN
Status: DISCONTINUED | OUTPATIENT
Start: 2022-12-26 | End: 2022-12-29 | Stop reason: HOSPADM

## 2022-12-26 RX ORDER — BISACODYL 10 MG
10 SUPPOSITORY, RECTAL RECTAL DAILY PRN
Status: DISCONTINUED | OUTPATIENT
Start: 2022-12-26 | End: 2022-12-29 | Stop reason: HOSPADM

## 2022-12-26 RX ORDER — PANTOPRAZOLE SODIUM 40 MG/1
40 TABLET, DELAYED RELEASE ORAL EVERY MORNING
Status: DISCONTINUED | OUTPATIENT
Start: 2022-12-27 | End: 2022-12-27

## 2022-12-26 RX ORDER — INSULIN LISPRO 100 [IU]/ML
2-9 INJECTION, SOLUTION INTRAVENOUS; SUBCUTANEOUS
Status: DISCONTINUED | OUTPATIENT
Start: 2022-12-26 | End: 2022-12-29 | Stop reason: HOSPADM

## 2022-12-26 RX ORDER — BISACODYL 5 MG/1
5 TABLET, DELAYED RELEASE ORAL DAILY PRN
Status: DISCONTINUED | OUTPATIENT
Start: 2022-12-26 | End: 2022-12-29 | Stop reason: HOSPADM

## 2022-12-26 RX ORDER — SODIUM CHLORIDE 9 MG/ML
100 INJECTION, SOLUTION INTRAVENOUS CONTINUOUS
Status: DISCONTINUED | OUTPATIENT
Start: 2022-12-26 | End: 2022-12-26

## 2022-12-26 RX ADMIN — ONDANSETRON 4 MG: 2 INJECTION INTRAMUSCULAR; INTRAVENOUS at 14:15

## 2022-12-26 RX ADMIN — ONDANSETRON 4 MG: 2 INJECTION INTRAMUSCULAR; INTRAVENOUS at 20:15

## 2022-12-26 RX ADMIN — ROSUVASTATIN 10 MG: 10 TABLET, FILM COATED ORAL at 20:50

## 2022-12-26 RX ADMIN — SODIUM CHLORIDE 1000 ML: 9 INJECTION, SOLUTION INTRAVENOUS at 11:58

## 2022-12-26 RX ADMIN — SODIUM CHLORIDE 100 ML/HR: 4.5 INJECTION, SOLUTION INTRAVENOUS at 18:39

## 2022-12-26 RX ADMIN — INSULIN LISPRO 2 UNITS: 100 INJECTION, SOLUTION INTRAVENOUS; SUBCUTANEOUS at 18:20

## 2022-12-26 RX ADMIN — CEFTRIAXONE 1 G: 1 INJECTION, POWDER, FOR SOLUTION INTRAMUSCULAR; INTRAVENOUS at 16:07

## 2022-12-26 RX ADMIN — MORPHINE SULFATE 4 MG: 4 INJECTION, SOLUTION INTRAMUSCULAR; INTRAVENOUS at 14:17

## 2022-12-26 RX ADMIN — INSULIN HUMAN 5 UNITS: 100 INJECTION, SOLUTION PARENTERAL at 17:13

## 2022-12-26 RX ADMIN — SERTRALINE 100 MG: 100 TABLET, FILM COATED ORAL at 20:50

## 2022-12-26 RX ADMIN — MONTELUKAST 10 MG: 10 TABLET, FILM COATED ORAL at 20:50

## 2022-12-26 RX ADMIN — TAMSULOSIN HYDROCHLORIDE 0.4 MG: 0.4 CAPSULE ORAL at 18:15

## 2022-12-26 NOTE — PROGRESS NOTES
Synopsis  Nursing report ED to floor  Lei Mercado  67 y.o.  male    HPI:   Chief Complaint   Patient presents with    Flank Pain    Blood in Urine       Admitting doctor:   Lenny Orr MD    Admitting diagnosis:   The primary encounter diagnosis was Hematuria, unspecified type. Diagnoses of Anemia, unspecified type and Right flank pain were also pertinent to this visit.    Code status:   Current Code Status       Date Active Code Status Order ID Comments User Context       12/26/2022 1604 CPR (Attempt to Resuscitate) [id number]  Edie Cole APRN ED        Question Answer    Code Status (Patient has no pulse and is not breathing) CPR (Attempt to Resuscitate)    Medical Interventions (Patient has pulse or is breathing) Full Support    Level Of Support Discussed With Patient                    Allergies:   Patient has no known allergies.    Isolation:  No active isolations     Fall Risk:  Fall Risk Assessment was completed, and patient is at high risk for falls.   Predictive Model Details         36 (Low) Factor Value    Calculated 12/26/2022 14:07 Age 67    Risk of Fall Model Musculoskeletal Assessment WDL     Active Peripheral IV Present     Diastolic BP 52     Respiratory Rate 18     Skin Assessment WDL     Magnesium not on file     Total Bilirubin 5 mg/dL     Financial Class Medicare     Albumin 3.3 g/dL     Drug Use No     Creatinine 2.16 mg/dL     Tobacco Use Quit     Calcium 8.7 mg/dL     Caleb Scale not on file     Chloride 98 mmol/L     Peripheral Vascular Assessment WDL     Sex Male     Gastrointestinal Assessment WDL     Number of Distinct Medication Classes administered 1     Cardiac Assessment WDL     ALT 7 U/L     Days after Admission 0.115     Potassium 4.1 mmol/L         Weight:       12/26/22  1120   Weight: 103 kg (227 lb 8.2 oz)       Intake and Output    Intake/Output Summary (Last 24 hours) at 12/26/2022 1705  Last data filed at 12/26/2022 1637  Gross per 24 hour   Intake 1100 ml   Output  --   Net 1100 ml       Diet:        Most recent vitals:   Vitals:    12/26/22 1306 12/26/22 1400 12/26/22 1500 12/26/22 1600   BP: 115/52 116/47 125/61 134/42   BP Location:       Patient Position:       Pulse:  89 81 94   Resp:       Temp:       TempSrc:       SpO2: 98% 100% 100%    Weight:       Height:           Active LDAs/IV Access:   Lines, Drains & Airways       Active LDAs       Name Placement date Placement time Site Days    Peripheral IV 12/26/22 1149 Left Antecubital 12/26/22  1149  Antecubital  less than 1                    Skin Condition:   Skin Assessments (last day)       None             Labs (abnormal labs have a star):   Labs Reviewed   COMPREHENSIVE METABOLIC PANEL - Abnormal; Notable for the following components:       Result Value    Glucose 441 (*)     BUN 51 (*)     Creatinine 2.16 (*)     Sodium 130 (*)     CO2 21.0 (*)     Albumin 3.30 (*)     Alkaline Phosphatase 126 (*)     Total Bilirubin 5.0 (*)     eGFR 32.7 (*)     All other components within normal limits    Narrative:     GFR Normal >60  Chronic Kidney Disease <60  Kidney Failure <15     URINALYSIS W/ MICROSCOPIC IF INDICATED (NO CULTURE) - Abnormal; Notable for the following components:    Appearance, UA Cloudy (*)     Glucose,  mg/dL (1+) (*)     Bilirubin, UA Moderate (2+) (*)     Blood, UA Large (3+) (*)     Protein, UA >=300 mg/dL (3+) (*)     Urobilinogen, UA 4.0 E.U./dL (*)     All other components within normal limits   PROTIME-INR - Abnormal; Notable for the following components:    INR 1.14 (*)     All other components within normal limits   APTT - Abnormal; Notable for the following components:    PTT 34.5 (*)     All other components within normal limits   CBC WITH AUTO DIFFERENTIAL - Abnormal; Notable for the following components:    RBC 2.41 (*)     Hemoglobin 7.5 (*)     Hematocrit 24.0 (*)     MCV 99.6 (*)     MCHC 31.3 (*)     RDW 16.4 (*)     RDW-SD 57.8 (*)     Platelets 120 (*)     Neutrophil % 84.4 (*)      Lymphocyte % 8.2 (*)     Lymphocytes, Absolute 0.30 (*)     All other components within normal limits   URINALYSIS, MICROSCOPIC ONLY - Abnormal; Notable for the following components:    RBC, UA Too Numerous to Count (*)     WBC, UA 6-12 (*)     Bacteria, UA Trace (*)     All other components within normal limits   LIPASE - Normal   URINE CULTURE   HEMOGLOBIN A1C   POCT GLUCOSE FINGERSTICK   POCT GLUCOSE FINGERSTICK   POCT GLUCOSE FINGERSTICK   POCT GLUCOSE FINGERSTICK   TYPE AND SCREEN   BB ARMBAND CHECK   ANTIBODY IDENTIFICATION   PATIENT ANTIGEN TYPE   CBC AND DIFFERENTIAL    Narrative:     The following orders were created for panel order CBC & Differential.  Procedure                               Abnormality         Status                     ---------                               -----------         ------                     CBC Auto Differential[[id number]]        Abnormal            Final result                 Please view results for these tests on the individual orders.       LOC: Person, Place, Time, and Situation    Telemetry:  Observation Unit    Cardiac Monitoring Ordered: no    EKG:   No orders to display       Medications Given in the ED:   Medications   sodium chloride 0.9 % flush 10 mL (has no administration in time range)   insulin regular (humuLIN R,novoLIN R) injection 5 Units (has no administration in time range)   dextrose (GLUTOSE) oral gel 15 g (has no administration in time range)   dextrose (D50W) (25 g/50 mL) IV injection 25 g (has no administration in time range)   glucagon (human recombinant) (GLUCAGEN DIAGNOSTIC) 1 mg in sterile water (preservative free) 1 mL injection (has no administration in time range)   insulin lispro (ADMELOG) injection 2-9 Units (has no administration in time range)   sodium chloride 0.9 % bolus 1,000 mL (0 mL Intravenous Stopped 12/26/22 1307)   morphine injection 4 mg (4 mg Intravenous Given 12/26/22 1417)   ondansetron (ZOFRAN) injection 4 mg (4 mg  Intravenous Given 22 1415)   cefTRIAXone (ROCEPHIN) 1 g in sodium chloride 0.9 % 100 mL IVPB (0 g Intravenous Stopped 22 1637)       Imaging results:  No radiology results for the last day    Social issues:   Social History     Socioeconomic History    Marital status: Single   Tobacco Use    Smoking status: Former     Packs/day: 1.00     Years: 15.00     Pack years: 15.00     Types: Cigarettes     Start date: 1971     Quit date: 1984     Years since quittin.6    Smokeless tobacco: Never    Tobacco comments:     quit 20 plus years ago    Vaping Use    Vaping Use: Never used   Substance and Sexual Activity    Alcohol use: Never    Drug use: Not Currently     Types: Marijuana    Sexual activity: Defer     Partners: Female       NIH Stroke Scale:  Interval: (not recorded)  1a. Level of Consciousness: (not recorded)  1b. LOC Questions: (not recorded)  1c. LOC Commands: (not recorded)  2. Best Gaze: (not recorded)  3. Visual: (not recorded)  4. Facial Palsy: (not recorded)  5a. Motor Arm, Left: (not recorded)  5b. Motor Arm, Right: (not recorded)  6a. Motor Leg, Left: (not recorded)  6b. Motor Leg, Right: (not recorded)  7. Limb Ataxia: (not recorded)  8. Sensory: (not recorded)  9. Best Language: (not recorded)  10. Dysarthria: (not recorded)  11. Extinction and Inattention (formerly Neglect): (not recorded)    Total (NIH Stroke Scale): (not recorded)     Additional notable assessment information:       Nursing report ED to floor:  Tremaine Langford RN

## 2022-12-26 NOTE — ED PROVIDER NOTES
"Subjective   History of Present Illness  Chief Complaint: Flank pain, hematuria      HPI: Patient is a 67-year-old male who presents to the ER today by private vehicle has a known history of Parikh and macrocytic anemia, states that he has had some right-sided abdominal pain that is now into his right flank he reports hematuria with clots.  States he was recently in the emergency room following of paracentesis, he had a hemoglobin of 4 and received multiple transfusions and was admitted to observation.  He has had no nausea or vomiting no diarrhea or constipation.  States he has been told he has a kidney stone in the past.    PCP: Slade  GI: Xavier        Review of Systems   Constitutional: Negative.    HENT: Negative.    Eyes: Negative.    Respiratory: Negative.    Cardiovascular: Negative.    Gastrointestinal: Positive for abdominal pain.   Genitourinary: Positive for flank pain and hematuria.   Skin: Negative.    Neurological: Negative.    Hematological: Negative.    Psychiatric/Behavioral: Negative.        Past Medical History:   Diagnosis Date   • Anemia 09/2011   • Anesthesia complication     AWAKENED EARLY, ASPIRATION PNEUMONIA   • Cancer (HCC)     KIDNEY   • Cirrhosis (HCC)    • Colon polyp    • Diabetes mellitus (HCC)    • Diverticulitis of colon    • Esophageal varices (HCC)    • Fatty liver    • Gallstones    • Hyperlipidemia    • Hypertension    • Liver disease        No Known Allergies    Past Surgical History:   Procedure Laterality Date   • COLONOSCOPY  approx 2018    benign polyps per pt    • ENDOSCOPY N/A 02/28/2022    Procedure: ESOPHAGOGASTRODUODENOSCOPY;  Surgeon: David Almeida MD;  Location: [facility] ENDOSCOPY;  Service: Gastroenterology;  Laterality: N/A;  pre - ruq pain, hx parikh, cirrhosis  post - hiatal hernia   • LIVER BIOPSY     • NEPHRECTOMY Right     partial    • SHOULDER SURGERY      right shoulder \"reverse\" per pt    • UPPER GASTROINTESTINAL ENDOSCOPY  approx 2018    negative per pt  "       Family History   Problem Relation Age of Onset   • Cirrhosis Mother    • Liver disease Sister    • Malig Hyperthermia Neg Hx        Social History     Socioeconomic History   • Marital status: Single   Tobacco Use   • Smoking status: Former     Packs/day: 1.00     Years: 15.00     Pack years: 15.00     Types: Cigarettes     Start date: 1971     Quit date: 1984     Years since quittin.6   • Smokeless tobacco: Never   • Tobacco comments:     quit 20 plus years ago    Vaping Use   • Vaping Use: Never used   Substance and Sexual Activity   • Alcohol use: Never   • Drug use: Not Currently     Types: Marijuana   • Sexual activity: Defer     Partners: Female           Objective   Physical Exam  Vitals reviewed.   Constitutional:       General: He is not in acute distress.     Appearance: He is obese.   HENT:      Head: Normocephalic.   Eyes:      Extraocular Movements: Extraocular movements intact.      Pupils: Pupils are equal, round, and reactive to light.   Cardiovascular:      Rate and Rhythm: Normal rate.      Pulses: Normal pulses.   Pulmonary:      Effort: Pulmonary effort is normal.      Breath sounds: No stridor.   Abdominal:      General: Bowel sounds are normal. There is distension.      Palpations: Abdomen is soft. There is no fluid wave.      Tenderness: There is abdominal tenderness. There is no right CVA tenderness or left CVA tenderness.   Musculoskeletal:         General: Normal range of motion.      Cervical back: Neck supple. No rigidity.   Skin:     General: Skin is warm and dry.      Coloration: Skin is pale.   Neurological:      General: No focal deficit present.      Mental Status: He is alert and oriented to person, place, and time.      Motor: No weakness.         Procedures           ED Course  ED Course as of 22 1602   Mon Dec 26, 2022   1213 Hemoglobin(!): 7.5 []   1418 Awaiting return call from urology  []   1503 Reported issues with the answering service, continue  "to await return call from urology  []   1527 Spoke with Dr. Decker with Urology who advised if the patients pain is to discharge and follow up outpatient. If not complete another CT of the patient and treat accordingly.  Patient reported some improvement of his pain, complete resolution of his nausea but has concerns with discharge due to low HGB and continue hematuria.  []      ED Course User Index  [] Shaylee Gonzales APRN           /61   Pulse 81   Temp 96.8 °F (36 °C) (Temporal)   Resp 18   Ht 188 cm (74\")   Wt 103 kg (227 lb 8.2 oz)   SpO2 100%   BMI 29.21 kg/m²   Labs Reviewed   COMPREHENSIVE METABOLIC PANEL - Abnormal; Notable for the following components:       Result Value    Glucose 441 (*)     BUN 51 (*)     Creatinine 2.16 (*)     Sodium 130 (*)     CO2 21.0 (*)     Albumin 3.30 (*)     Alkaline Phosphatase 126 (*)     Total Bilirubin 5.0 (*)     eGFR 32.7 (*)     All other components within normal limits    Narrative:     GFR Normal >60  Chronic Kidney Disease <60  Kidney Failure <15     URINALYSIS W/ MICROSCOPIC IF INDICATED (NO CULTURE) - Abnormal; Notable for the following components:    Appearance, UA Cloudy (*)     Glucose,  mg/dL (1+) (*)     Bilirubin, UA Moderate (2+) (*)     Blood, UA Large (3+) (*)     Protein, UA >=300 mg/dL (3+) (*)     Urobilinogen, UA 4.0 E.U./dL (*)     All other components within normal limits   PROTIME-INR - Abnormal; Notable for the following components:    INR 1.14 (*)     All other components within normal limits   APTT - Abnormal; Notable for the following components:    PTT 34.5 (*)     All other components within normal limits   CBC WITH AUTO DIFFERENTIAL - Abnormal; Notable for the following components:    RBC 2.41 (*)     Hemoglobin 7.5 (*)     Hematocrit 24.0 (*)     MCV 99.6 (*)     MCHC 31.3 (*)     RDW 16.4 (*)     RDW-SD 57.8 (*)     Platelets 120 (*)     Neutrophil % 84.4 (*)     Lymphocyte % 8.2 (*)     Lymphocytes, Absolute " 0.30 (*)     All other components within normal limits   URINALYSIS, MICROSCOPIC ONLY - Abnormal; Notable for the following components:    RBC, UA Too Numerous to Count (*)     WBC, UA 6-12 (*)     Bacteria, UA Trace (*)     All other components within normal limits   LIPASE - Normal   URINE CULTURE   TYPE AND SCREEN   BB ARMBAND CHECK   CBC AND DIFFERENTIAL    Narrative:     The following orders were created for panel order CBC & Differential.  Procedure                               Abnormality         Status                     ---------                               -----------         ------                     CBC Auto Differential[[id number]]        Abnormal            Final result                 Please view results for these tests on the individual orders.     Medications   sodium chloride 0.9 % flush 10 mL (has no administration in time range)   cefTRIAXone (ROCEPHIN) 1 g in sodium chloride 0.9 % 100 mL IVPB (has no administration in time range)   insulin regular (humuLIN R,novoLIN R) injection 5 Units (has no administration in time range)   sodium chloride 0.9 % bolus 1,000 mL (0 mL Intravenous Stopped 12/26/22 1307)   morphine injection 4 mg (4 mg Intravenous Given 12/26/22 1417)   ondansetron (ZOFRAN) injection 4 mg (4 mg Intravenous Given 12/26/22 1415)     No radiology results for the last day                                  MDM  Number of Diagnoses or Management Options  Anemia, unspecified type  Hematuria, unspecified type  Right flank pain  Diagnosis management comments: While in the emergency room patient was placed on appropriate monitoring IV was established and labs were obtained.  Patient's hemoglobin today was 7.5 in comparison to 3 days ago at 8.4, shortly before that his hemoglobin was at 4.9.  He has a known history of macrocytic anemia, presented today with hematuria, urinalysis was positive for large blood as well as trace bacteria.  With review of CT scan completed on 12/22, right  hydroureteronephrosis with a 2 mm stone.  Creatinine at 2.16 which appears patient's baseline from previous.  Spoke with Dr. Decker with urology who advised if patient's pain was improved he would be okay for outpatient follow-up, though patient has had a decreased in his hemoglobin since 2 days ago and continues to have hematuria concerns for impending severe anemia as he has required transfusions within the last week.  Patient's blood sugar was noted at 441 he does have a history of diabetes he was given 5 units of Humulin R.  CT of the abdomen pelvis without contrast, pending at time of observation placement, has been added as well as Rocephin, patient will be placed in the observation unit after discussing case with Dr. Orr.  Patient was updated on this plan of care, he was agreeable, alert and oriented.    Radiology Studies:  Reviewed by myself, Read by Radiologist.  Chart review: 12/22/2022 ED visit, admission for observation  PERALES, anemia   CT without  New right-sided hydroureteronephrosis, mild and felt to be secondary to  a faint 2 mm stone in the distal right ureter. Liver morphology of  cirrhosis, diffuse ascites and splenomegaly are stable findings. There  is a new left pleural effusion.    Laboratory:   HGB 7.5, 12/23/2022 HGB 8.4  Creat 2.16  Urinalysis Large blood, Trace bacteria    Comorbidities: see above, reviewed     Differentials: UTI, Anemia, Ureterolithiasis not all inclusive of differentials considered    This document is intended for medical expert use only.  Reading of this document by patient and/or patient's family without participating medical staff guidance may result misinterpretation and unintended morbidity.  Any interpretation of such data is the responsibility of the patient and or family member responsible for the patient in concert with their primary specialist providers, not to be left for resources of online searches such as Web MD, [employer] or similar core use.  Relying on  these approaches to her knowledge may result in misinterpretation, misguided goals of care and even death should patient's or family history of recommendations outside of drama professional medical care in a supervised inpatient environment.    Appropriate PPE worn throughout the care of this patient.    Part of this note may be an electronic transcription/translation of spoken language to printed text using the Dragon Dictation System.            Amount and/or Complexity of Data Reviewed  Clinical lab tests: reviewed  Decide to obtain previous medical records or to obtain history from someone other than the patient: yes    Risk of Complications, Morbidity, and/or Mortality  Presenting problems: high    Patient Progress  Patient progress: stable      Final diagnoses:   Hematuria, unspecified type   Anemia, unspecified type   Right flank pain       ED Disposition  ED Disposition     ED Disposition   Decision to Admit    Condition   --    Comment   --             No follow-up provider specified.       Medication List      No changes were made to your prescriptions during this visit.          Shaylee Gonzales, APRN  12/26/22 160

## 2022-12-27 ENCOUNTER — APPOINTMENT (OUTPATIENT)
Dept: ULTRASOUND IMAGING | Facility: HOSPITAL | Age: 67
DRG: 690 | End: 2022-12-27
Payer: MEDICARE

## 2022-12-27 ENCOUNTER — INPATIENT HOSPITAL (AMBULATORY)
Dept: URBAN - METROPOLITAN AREA HOSPITAL 84 | Facility: HOSPITAL | Age: 67
End: 2022-12-27

## 2022-12-27 ENCOUNTER — ANESTHESIA (OUTPATIENT)
Dept: GASTROENTEROLOGY | Facility: HOSPITAL | Age: 67
DRG: 690 | End: 2022-12-27
Payer: MEDICARE

## 2022-12-27 ENCOUNTER — ANESTHESIA EVENT (OUTPATIENT)
Dept: GASTROENTEROLOGY | Facility: HOSPITAL | Age: 67
DRG: 690 | End: 2022-12-27
Payer: MEDICARE

## 2022-12-27 DIAGNOSIS — D64.9 ANEMIA, UNSPECIFIED: ICD-10-CM

## 2022-12-27 DIAGNOSIS — K44.9 DIAPHRAGMATIC HERNIA WITHOUT OBSTRUCTION OR GANGRENE: ICD-10-CM

## 2022-12-27 DIAGNOSIS — N17.9 ACUTE KIDNEY FAILURE, UNSPECIFIED: ICD-10-CM

## 2022-12-27 DIAGNOSIS — R31.9 HEMATURIA, UNSPECIFIED: ICD-10-CM

## 2022-12-27 DIAGNOSIS — K74.69 OTHER CIRRHOSIS OF LIVER: ICD-10-CM

## 2022-12-27 DIAGNOSIS — K31.7 POLYP OF STOMACH AND DUODENUM: ICD-10-CM

## 2022-12-27 DIAGNOSIS — K31.89 OTHER DISEASES OF STOMACH AND DUODENUM: ICD-10-CM

## 2022-12-27 DIAGNOSIS — R18.8 OTHER ASCITES: ICD-10-CM

## 2022-12-27 DIAGNOSIS — K74.60 UNSPECIFIED CIRRHOSIS OF LIVER: ICD-10-CM

## 2022-12-27 DIAGNOSIS — R10.12 LEFT UPPER QUADRANT PAIN: ICD-10-CM

## 2022-12-27 DIAGNOSIS — R94.5 ABNORMAL RESULTS OF LIVER FUNCTION STUDIES: ICD-10-CM

## 2022-12-27 DIAGNOSIS — K75.81 NONALCOHOLIC STEATOHEPATITIS (NASH): ICD-10-CM

## 2022-12-27 DIAGNOSIS — R93.3 ABNORMAL FINDINGS ON DIAGNOSTIC IMAGING OF OTHER PARTS OF DI: ICD-10-CM

## 2022-12-27 LAB
ALPHA-FETOPROTEIN: <2 NG/ML (ref 0–8.3)
ANION GAP SERPL CALCULATED.3IONS-SCNC: 8 MMOL/L (ref 5–15)
ANISOCYTOSIS BLD QL: ABNORMAL
BACTERIA SPEC AEROBE CULT: NO GROWTH
BUN SERPL-MCNC: 51 MG/DL (ref 8–23)
BUN/CREAT SERPL: 25.1 (ref 7–25)
CALCIUM SPEC-SCNC: 8.1 MG/DL (ref 8.6–10.5)
CHLORIDE SERPL-SCNC: 102 MMOL/L (ref 98–107)
CO2 SERPL-SCNC: 22 MMOL/L (ref 22–29)
CREAT SERPL-MCNC: 2.03 MG/DL (ref 0.76–1.27)
DACRYOCYTES BLD QL SMEAR: ABNORMAL
DEPRECATED RDW RBC AUTO: 57.8 FL (ref 37–54)
DEPRECATED RDW RBC AUTO: 60.8 FL (ref 37–54)
EGFRCR SERPLBLD CKD-EPI 2021: 35.3 ML/MIN/1.73
ERYTHROCYTE [DISTWIDTH] IN BLOOD BY AUTOMATED COUNT: 16.2 % (ref 12.3–15.4)
ERYTHROCYTE [DISTWIDTH] IN BLOOD BY AUTOMATED COUNT: 17.1 % (ref 12.3–15.4)
FIBRINOGEN PPP-MCNC: 556 MG/DL (ref 210–450)
GLUCOSE BLDC GLUCOMTR-MCNC: 258 MG/DL (ref 70–105)
GLUCOSE BLDC GLUCOMTR-MCNC: 262 MG/DL (ref 70–105)
GLUCOSE BLDC GLUCOMTR-MCNC: 265 MG/DL (ref 70–105)
GLUCOSE BLDC GLUCOMTR-MCNC: 318 MG/DL (ref 70–105)
GLUCOSE BLDC GLUCOMTR-MCNC: 387 MG/DL (ref 70–105)
GLUCOSE SERPL-MCNC: 386 MG/DL (ref 65–99)
HBA1C MFR BLD: 5.1 % (ref 3.5–5.6)
HCT VFR BLD AUTO: 18.2 % (ref 37.5–51)
HCT VFR BLD AUTO: 19.6 % (ref 37.5–51)
HCT VFR BLD AUTO: 22.2 % (ref 37.5–51)
HGB BLD-MCNC: 5.7 G/DL (ref 13–17.7)
HGB BLD-MCNC: 6.3 G/DL (ref 13–17.7)
HGB BLD-MCNC: 7.2 G/DL (ref 13–17.7)
HOLD SPECIMEN: NORMAL
INR PPP: 1.09 (ref 0.93–1.1)
LYMPHOCYTES # BLD MANUAL: 0.24 10*3/MM3 (ref 0.7–3.1)
LYMPHOCYTES NFR BLD MANUAL: 1 % (ref 5–12)
MCH RBC QN AUTO: 31.1 PG (ref 26.6–33)
MCH RBC QN AUTO: 31.2 PG (ref 26.6–33)
MCHC RBC AUTO-ENTMCNC: 31.3 G/DL (ref 31.5–35.7)
MCHC RBC AUTO-ENTMCNC: 32.3 G/DL (ref 31.5–35.7)
MCV RBC AUTO: 96.5 FL (ref 79–97)
MCV RBC AUTO: 99.7 FL (ref 79–97)
METAMYELOCYTES NFR BLD MANUAL: 1 % (ref 0–0)
MONOCYTES # BLD: 0.02 10*3/MM3 (ref 0.1–0.9)
NEUTROPHILS # BLD AUTO: 1.43 10*3/MM3 (ref 1.7–7)
NEUTROPHILS NFR BLD MANUAL: 80 % (ref 42.7–76)
NEUTS BAND NFR BLD MANUAL: 4 % (ref 0–5)
PLATELET # BLD AUTO: 99 10*3/MM3 (ref 140–450)
PLATELET # BLD AUTO: 99 10*3/MM3 (ref 140–450)
PMV BLD AUTO: 7.3 FL (ref 6–12)
PMV BLD AUTO: 7.5 FL (ref 6–12)
POIKILOCYTOSIS BLD QL SMEAR: ABNORMAL
POTASSIUM SERPL-SCNC: 4.5 MMOL/L (ref 3.5–5.2)
PROTHROMBIN TIME: 11.2 SECONDS (ref 9.6–11.7)
RBC # BLD AUTO: 1.82 10*6/MM3 (ref 4.14–5.8)
RBC # BLD AUTO: 2.03 10*6/MM3 (ref 4.14–5.8)
SCAN SLIDE: NORMAL
SMALL PLATELETS BLD QL SMEAR: ABNORMAL
SODIUM SERPL-SCNC: 132 MMOL/L (ref 136–145)
VARIANT LYMPHS NFR BLD MANUAL: 12 % (ref 19.6–45.3)
VARIANT LYMPHS NFR BLD MANUAL: 2 % (ref 0–5)
WBC MORPH BLD: NORMAL
WBC NRBC COR # BLD: 1.7 10*3/MM3 (ref 3.4–10.8)
WBC NRBC COR # BLD: 1.7 10*3/MM3 (ref 3.4–10.8)
WHOLE BLOOD HOLD COAG: NORMAL
WHOLE BLOOD HOLD SPECIMEN: NORMAL

## 2022-12-27 PROCEDURE — 85018 HEMOGLOBIN: CPT | Performed by: FAMILY MEDICINE

## 2022-12-27 PROCEDURE — 80048 BASIC METABOLIC PNL TOTAL CA: CPT | Performed by: NURSE PRACTITIONER

## 2022-12-27 PROCEDURE — 85027 COMPLETE CBC AUTOMATED: CPT | Performed by: NURSE PRACTITIONER

## 2022-12-27 PROCEDURE — 85025 COMPLETE CBC W/AUTO DIFF WBC: CPT | Performed by: NURSE PRACTITIONER

## 2022-12-27 PROCEDURE — 25010000002 PROPOFOL 200 MG/20ML EMULSION: Performed by: ANESTHESIOLOGY

## 2022-12-27 PROCEDURE — 99222 1ST HOSP IP/OBS MODERATE 55: CPT | Mod: 25,FS | Performed by: NURSE PRACTITIONER

## 2022-12-27 PROCEDURE — 25010000002 HYDROMORPHONE 1 MG/ML SOLUTION: Performed by: EMERGENCY MEDICINE

## 2022-12-27 PROCEDURE — 85007 BL SMEAR W/DIFF WBC COUNT: CPT | Performed by: NURSE PRACTITIONER

## 2022-12-27 PROCEDURE — 0DJ08ZZ INSPECTION OF UPPER INTESTINAL TRACT, VIA NATURAL OR ARTIFICIAL OPENING ENDOSCOPIC: ICD-10-PCS | Performed by: INTERNAL MEDICINE

## 2022-12-27 PROCEDURE — 85384 FIBRINOGEN ACTIVITY: CPT | Performed by: FAMILY MEDICINE

## 2022-12-27 PROCEDURE — 82105 ALPHA-FETOPROTEIN SERUM: CPT | Performed by: NURSE PRACTITIONER

## 2022-12-27 PROCEDURE — 63710000001 INSULIN GLARGINE PER 5 UNITS: Performed by: PHYSICIAN ASSISTANT

## 2022-12-27 PROCEDURE — 63710000001 INSULIN LISPRO (HUMAN) PER 5 UNITS: Performed by: NURSE PRACTITIONER

## 2022-12-27 PROCEDURE — G0378 HOSPITAL OBSERVATION PER HR: HCPCS

## 2022-12-27 PROCEDURE — 43235 EGD DIAGNOSTIC BRUSH WASH: CPT | Performed by: INTERNAL MEDICINE

## 2022-12-27 PROCEDURE — 82746 ASSAY OF FOLIC ACID SERUM: CPT | Performed by: INTERNAL MEDICINE

## 2022-12-27 PROCEDURE — 25010000002 ONDANSETRON PER 1 MG: Performed by: NURSE PRACTITIONER

## 2022-12-27 PROCEDURE — 85014 HEMATOCRIT: CPT | Performed by: FAMILY MEDICINE

## 2022-12-27 PROCEDURE — 85610 PROTHROMBIN TIME: CPT | Performed by: NURSE PRACTITIONER

## 2022-12-27 PROCEDURE — 86900 BLOOD TYPING SEROLOGIC ABO: CPT

## 2022-12-27 PROCEDURE — 25010000002 OCTREOTIDE PER 25 MCG: Performed by: PHYSICIAN ASSISTANT

## 2022-12-27 PROCEDURE — 36430 TRANSFUSION BLD/BLD COMPNT: CPT

## 2022-12-27 PROCEDURE — 76705 ECHO EXAM OF ABDOMEN: CPT

## 2022-12-27 PROCEDURE — 25010000002 CEFTRIAXONE PER 250 MG: Performed by: PHYSICIAN ASSISTANT

## 2022-12-27 PROCEDURE — 82962 GLUCOSE BLOOD TEST: CPT

## 2022-12-27 PROCEDURE — P9016 RBC LEUKOCYTES REDUCED: HCPCS

## 2022-12-27 RX ORDER — ALBUMIN (HUMAN) 12.5 G/50ML
112.5 SOLUTION INTRAVENOUS ONCE
Status: COMPLETED | OUTPATIENT
Start: 2022-12-27 | End: 2022-12-28

## 2022-12-27 RX ORDER — ALBUMIN (HUMAN) 12.5 G/50ML
75 SOLUTION INTRAVENOUS ONCE
Status: COMPLETED | OUTPATIENT
Start: 2022-12-27 | End: 2022-12-28

## 2022-12-27 RX ORDER — LIDOCAINE HYDROCHLORIDE 20 MG/ML
INJECTION, SOLUTION INFILTRATION; PERINEURAL AS NEEDED
Status: DISCONTINUED | OUTPATIENT
Start: 2022-12-27 | End: 2022-12-27 | Stop reason: SURG

## 2022-12-27 RX ORDER — ALBUMIN (HUMAN) 12.5 G/50ML
87.5 SOLUTION INTRAVENOUS ONCE
Status: COMPLETED | OUTPATIENT
Start: 2022-12-27 | End: 2022-12-28

## 2022-12-27 RX ORDER — ALBUMIN (HUMAN) 12.5 G/50ML
37.5 SOLUTION INTRAVENOUS ONCE
Status: COMPLETED | OUTPATIENT
Start: 2022-12-27 | End: 2022-12-28

## 2022-12-27 RX ORDER — SODIUM CHLORIDE 0.9 % (FLUSH) 0.9 %
3-10 SYRINGE (ML) INJECTION AS NEEDED
Status: DISCONTINUED | OUTPATIENT
Start: 2022-12-27 | End: 2022-12-29 | Stop reason: HOSPADM

## 2022-12-27 RX ORDER — SODIUM CHLORIDE 0.9 % (FLUSH) 0.9 %
3 SYRINGE (ML) INJECTION EVERY 12 HOURS SCHEDULED
Status: DISCONTINUED | OUTPATIENT
Start: 2022-12-27 | End: 2022-12-29 | Stop reason: HOSPADM

## 2022-12-27 RX ORDER — ALBUMIN (HUMAN) 12.5 G/50ML
62.5 SOLUTION INTRAVENOUS ONCE
Status: COMPLETED | OUTPATIENT
Start: 2022-12-27 | End: 2022-12-28

## 2022-12-27 RX ORDER — PANTOPRAZOLE SODIUM 40 MG/1
40 TABLET, DELAYED RELEASE ORAL
Status: DISCONTINUED | OUTPATIENT
Start: 2022-12-27 | End: 2022-12-29 | Stop reason: HOSPADM

## 2022-12-27 RX ORDER — PROPOFOL 10 MG/ML
INJECTION, EMULSION INTRAVENOUS AS NEEDED
Status: DISCONTINUED | OUTPATIENT
Start: 2022-12-27 | End: 2022-12-27 | Stop reason: SURG

## 2022-12-27 RX ORDER — ALBUMIN (HUMAN) 12.5 G/50ML
100 SOLUTION INTRAVENOUS ONCE
Status: COMPLETED | OUTPATIENT
Start: 2022-12-27 | End: 2022-12-28

## 2022-12-27 RX ORDER — SODIUM CHLORIDE 9 MG/ML
40 INJECTION, SOLUTION INTRAVENOUS AS NEEDED
Status: DISCONTINUED | OUTPATIENT
Start: 2022-12-27 | End: 2022-12-29 | Stop reason: HOSPADM

## 2022-12-27 RX ORDER — ALBUMIN (HUMAN) 12.5 G/50ML
50 SOLUTION INTRAVENOUS ONCE
Status: COMPLETED | OUTPATIENT
Start: 2022-12-27 | End: 2022-12-28

## 2022-12-27 RX ADMIN — PANTOPRAZOLE SODIUM 40 MG: 40 TABLET, DELAYED RELEASE ORAL at 07:50

## 2022-12-27 RX ADMIN — INSULIN LISPRO 6 UNITS: 100 INJECTION, SOLUTION INTRAVENOUS; SUBCUTANEOUS at 17:40

## 2022-12-27 RX ADMIN — SERTRALINE 100 MG: 100 TABLET, FILM COATED ORAL at 21:20

## 2022-12-27 RX ADMIN — Medication 10 ML: at 08:35

## 2022-12-27 RX ADMIN — HYDROMORPHONE HYDROCHLORIDE 0.25 MG: 1 INJECTION, SOLUTION INTRAMUSCULAR; INTRAVENOUS; SUBCUTANEOUS at 05:46

## 2022-12-27 RX ADMIN — INSULIN LISPRO 7 UNITS: 100 INJECTION, SOLUTION INTRAVENOUS; SUBCUTANEOUS at 07:55

## 2022-12-27 RX ADMIN — PANTOPRAZOLE SODIUM 40 MG: 40 TABLET, DELAYED RELEASE ORAL at 17:40

## 2022-12-27 RX ADMIN — SODIUM CHLORIDE 40 ML: 9 INJECTION, SOLUTION INTRAVENOUS at 12:22

## 2022-12-27 RX ADMIN — LIDOCAINE HYDROCHLORIDE 100 MG: 20 INJECTION, SOLUTION INFILTRATION; PERINEURAL at 12:22

## 2022-12-27 RX ADMIN — INSULIN LISPRO 6 UNITS: 100 INJECTION, SOLUTION INTRAVENOUS; SUBCUTANEOUS at 13:40

## 2022-12-27 RX ADMIN — ROSUVASTATIN 10 MG: 10 TABLET, FILM COATED ORAL at 21:20

## 2022-12-27 RX ADMIN — MONTELUKAST 10 MG: 10 TABLET, FILM COATED ORAL at 21:20

## 2022-12-27 RX ADMIN — INSULIN GLARGINE 10 UNITS: 100 INJECTION, SOLUTION SUBCUTANEOUS at 21:20

## 2022-12-27 RX ADMIN — CEFTRIAXONE 1 G: 1 INJECTION, POWDER, FOR SOLUTION INTRAMUSCULAR; INTRAVENOUS at 07:55

## 2022-12-27 RX ADMIN — SODIUM CHLORIDE 100 ML/HR: 4.5 INJECTION, SOLUTION INTRAVENOUS at 04:15

## 2022-12-27 RX ADMIN — OCTREOTIDE ACETATE 25 MCG/HR: 500 INJECTION, SOLUTION INTRAVENOUS; SUBCUTANEOUS at 11:25

## 2022-12-27 RX ADMIN — TAMSULOSIN HYDROCHLORIDE 0.4 MG: 0.4 CAPSULE ORAL at 07:55

## 2022-12-27 RX ADMIN — PROPOFOL 220 MG: 10 INJECTION, EMULSION INTRAVENOUS at 12:22

## 2022-12-27 RX ADMIN — ONDANSETRON 4 MG: 2 INJECTION INTRAMUSCULAR; INTRAVENOUS at 02:21

## 2022-12-27 NOTE — PLAN OF CARE
Goal Outcome Evaluation:  Plan of Care Reviewed With: patient        Progress: improving  Outcome Evaluation: PT admitted for overnight observation. DR. Decker consulted. IVF administered as orderd. No s/s of distress noted at this time. No c/o pain. Will continue to monitor.

## 2022-12-27 NOTE — NURSING NOTE
EGD done today per GI. US RUQ pending. Will transfuse next unit PRBC when patient gets back from US. Per urology, recommended continue antibiotics for 1 week, no cute surgical intervention and follow-up as outpatient 2 weeks.

## 2022-12-27 NOTE — PROGRESS NOTES
[facility] Medicine Services Daily Progress Note    Patient Name: Lei Mercado  : 1955  MRN: [id number]  Primary Care Physician:  Froylan June  Date of admission: 2022      Subjective      Chief Complaint: Blood in urine      Patient reports hematuria overall improving.  Continue to monitor hemoglobin.  Patient had EGD today with no definitive signs of active bleeding.  Urology evaluated hematuria recommending to continue antibiotics at this time and monitor.  Discussion of possible colonoscopy in the near future.    Review of Systems   Gastrointestinal: Positive for bloating.   Genitourinary: Positive for flank pain and hematuria.   All other systems reviewed and are negative.        Objective      Vitals:   Temp:  [97.5 °F (36.4 °C)-98.4 °F (36.9 °C)] 98.3 °F (36.8 °C)  Heart Rate:  [68-93] 84  Resp:  [15-22] 18  BP: ()/(44-85) 127/46  Flow (L/min):  [6-12] 6    Physical Exam     General: Elderly male lying in bed breathing comfortably on room air no acute distress  HEENT: NC/AT, EOMI, mucosa moist  Heart: Regular, rate controlled  Chest: Normal work of breathing moving air well no wheezing  Abdominal: Firm, distended, nontender  Musculoskeletal: Normal ROM.  No cyanosis. No calf tenderness.  Neurological: AAOx3, no focal deficits  Skin: Skin is warm and dry. No rash  Psychiatric: Normal mood and affect.       Result Review    Result Review:  I have personally reviewed the results from the time of this admission to 2022 16:36 EST and agree with these findings:  [x]  Laboratory  [x]  Microbiology  [x]  Radiology  [x]  EKG/Telemetry   []  Cardiology/Vascular   []  Pathology  []  Old records  []  Other:  Most notable findings include: Leukopenia, anemia, thrombocytopenia          Assessment & Plan      Brief Patient Summary:  Lei Mercado is a 67 y.o. male with history of cirrhosis presenting for evaluation of right-sided flank/abdominal pain and initiation of milo red  blood and urine.  Patient reports he has been able to void but there have been episodes of clots in his urine.  Patient recently in the hospital for paracentesis with significant anemia getting multiple transfusions before being admitted observation and then being discharged.  Patient return to emergency department after the initiation of, pain with hematuria.  Patient with a initial hemoglobin of 7.5 on 12/26/2022 which decreased to 5.7 patient transfused 3 units of PRBCs.  GI was consulted from the observation unit for endoscopy.    albumin human, 37.5 g, Intravenous, Once   Or  albumin human, 50 g, Intravenous, Once   Or  albumin human, 62.5 g, Intravenous, Once   Or  albumin human, 75 g, Intravenous, Once   Or  albumin human, 87.5 g, Intravenous, Once   Or  albumin human, 100 g, Intravenous, Once   Or  albumin human, 112.5 g, Intravenous, Once  cefTRIAXone, 1 g, Intravenous, Q24H  insulin glargine, 10 Units, Subcutaneous, Nightly  insulin lispro, 2-9 Units, Subcutaneous, TID With Meals  montelukast, 10 mg, Oral, Daily  pantoprazole, 40 mg, Oral, BID AC  rosuvastatin, 10 mg, Oral, Daily  sertraline, 100 mg, Oral, Daily  sodium chloride, 10 mL, Intravenous, Q12H  sodium chloride, 3 mL, Intravenous, Q12H  tamsulosin, 0.4 mg, Oral, Daily       octreotide (SandoSTATIN) infusion, 25 mcg/hr, Last Rate: 25 mcg/hr (12/27/22 1630)  sodium chloride, 100 mL/hr, Last Rate: Stopped (12/27/22 1120)         Active Hospital Problems:  Active Hospital Problems    Diagnosis    • **Hematuria    • Anemia    • 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)      Plan:     Right flank pain-with associated hematuria evaluated by urology and felt to be secondary to underlying stone compounded by patient's bleeding and possible coagulopathy  -Patient voiding  -Rocephin  -Urology consulted and recommended 1 week of antibiotics  -Urine culture no growth at this time  -Monitor hemoglobin    Anemia-uncertain source of bleeding though concern for GI  and urinary source patient is required multiple transfusions over the last week  -Given the leukopenia and thrombocytopenia with consult hematology  -Transfuse hemoglobin less than 7.5 at this point  -Continue trending hemoglobin  -If continues to require transfusion may need to transfuse coagulation factors    Thrombocytopenia-uncertain source at this time if related to patient's cirrhosis  -Continue trending  -Check fibrinogen  -Hematology consultation    Leukopenia-uncertain if secondary to underlying infection or if associated with some bone marrow suppression  -Continue trending  -Continue Rocephin  -Hematology consultation    Cirrhosis-patient previous diagnosed history with recent paracentesis outpatient, being worked up by GI at this time  -Octreotide started by GI  -EGD with no active bleeding  -Monitor volume status  -Paracentesis planned 12/28/2022  -Monitor for any coagulopathy    Diabetes mellitus-keep blood sugars less than 200  -Sliding scale  -Long-acting  -Diabetic diet    Depression/hyperlipidemia/GERD-chronic in nature  -Resume home medication as clinically appropriate    DVT prophylaxis:  Mechanical DVT prophylaxis orders are present.    CODE STATUS:    Level Of Support Discussed With: Patient  Code Status (Patient has no pulse and is not breathing): CPR (Attempt to Resuscitate)  Medical Interventions (Patient has pulse or is breathing): Full Support      Disposition:  I expect patient to be discharged in 2 to 3 days possible discharge 12/30/2022.    This patient has been examined wearing appropriate Personal Protective Equipment and discussed with hospital infection control department. 12/27/22      Electronically signed by Bimal Hartman MD, 12/27/22, 16:36 EST.  [facility]ist Team

## 2022-12-27 NOTE — NURSING NOTE
This RN unaware and wasn't told during bedside shift report about patient's AM Hgb today of 5.7 from 7.5 on  admission.  Informed JANAE Lehman about AM lab draw. 2U PRBCs ordered; 1U blood transfusing.  Kept NPO for EGD today. Will start on octreotide drip. RUQ US with Doppler ordered per GI to rule out portal vein thrombus and plan for bedside paracentesis.

## 2022-12-27 NOTE — ANESTHESIA PREPROCEDURE EVALUATION
Anesthesia Evaluation     Patient summary reviewed and Nursing notes reviewed   NPO Solid Status: > 8 hours  NPO Liquid Status: > 8 hours           Airway   Mallampati: II  TM distance: >3 FB  Neck ROM: full  No difficulty expected  Dental - normal exam     Pulmonary    Cardiovascular     (+) hypertension, hyperlipidemia,       Neuro/Psych  GI/Hepatic/Renal/Endo    (+)   hepatitis, liver disease cirrhosis, diabetes mellitus,     Musculoskeletal     Abdominal    Substance History      OB/GYN          Other   blood dyscrasia anemia,   history of cancer    ROS/Med Hx Other: ? Left ventricular systolic function is normal.  ? Left ventricular ejection fraction is 55 to 60%  ? Left ventricular wall thickness is consistent with mild concentric hypertrophy.  ? Left ventricular diastolic function is consistent with (grade I) impaired relaxation.                     Anesthesia Plan    ASA 4     MAC     intravenous induction     Anesthetic plan, risks, benefits, and alternatives have been provided, discussed and informed consent has been obtained with: patient.        CODE STATUS:    Level Of Support Discussed With: Patient  Code Status (Patient has no pulse and is not breathing): CPR (Attempt to Resuscitate)  Medical Interventions (Patient has pulse or is breathing): Full Support

## 2022-12-27 NOTE — CONSULTS
"     FIRST UROLOGY CONSULT      Patient Identification:  NAME:  Lei Mercado  Age:  67 y.o.   Sex:  male   :  1955   MRN:  [id number]       Chief complaint/Reason for consult: UTI    History of present illness:  67 y.o. male known to Dr. Lopez history of right partial nephrectomy.  Recent right ureteral stone.  Follow-up CT performed yesterday showed passage of stone with right perinephric stranding.  Urine culture so far no growth.      Past medical history:  Past Medical History:   Diagnosis Date   • Anemia 2011   • Anesthesia complication     AWAKENED EARLY, ASPIRATION PNEUMONIA   • Cancer (HCC)     KIDNEY   • Cirrhosis (HCC)    • Colon polyp    • Diabetes mellitus (HCC)    • Diverticulitis of colon    • Esophageal varices (HCC)    • Fatty liver    • Gallstones    • Hyperlipidemia    • Hypertension    • Liver disease        Past surgical history:  Past Surgical History:   Procedure Laterality Date   • COLONOSCOPY  approx 2018    benign polyps per pt    • ENDOSCOPY N/A 2022    Procedure: ESOPHAGOGASTRODUODENOSCOPY;  Surgeon: David Almeida MD;  Location: [facility];  Service: Gastroenterology;  Laterality: N/A;  pre - ruq pain, hx parikh, cirrhosis  post - hiatal hernia   • LIVER BIOPSY     • NEPHRECTOMY Right     partial    • SHOULDER SURGERY      right shoulder \"reverse\" per pt    • UPPER GASTROINTESTINAL ENDOSCOPY  approx 2018    negative per pt        Allergies:  Patient has no known allergies.    Home medications:  Medications Prior to Admission   Medication Sig Dispense Refill Last Dose   • esomeprazole (nexIUM) 40 MG capsule Take 40 mg by mouth Every Evening.      • metFORMIN ER (GLUCOPHAGE-XR) 500 MG 24 hr tablet Take 500 mg by mouth Every Evening.      • montelukast (SINGULAIR) 10 MG tablet Take 10 mg by mouth every night at bedtime.      • pioglitazone (ACTOS) 15 MG tablet Take 15 mg by mouth Every Evening.      • rosuvastatin (CRESTOR) 10 MG tablet Take 10 mg by mouth Every " Night.      • sertraline (ZOLOFT) 100 MG tablet Take 200 mg by mouth Every Evening.      • terazosin (HYTRIN) 10 MG capsule Take 10 mg by mouth Every Night.           Hospital medications:  albumin human, 37.5 g, Intravenous, Once   Or  albumin human, 50 g, Intravenous, Once   Or  albumin human, 62.5 g, Intravenous, Once   Or  albumin human, 75 g, Intravenous, Once   Or  albumin human, 87.5 g, Intravenous, Once   Or  albumin human, 100 g, Intravenous, Once   Or  albumin human, 112.5 g, Intravenous, Once  cefTRIAXone, 1 g, Intravenous, Q24H  insulin glargine, 10 Units, Subcutaneous, Nightly  insulin lispro, 2-9 Units, Subcutaneous, TID With Meals  montelukast, 10 mg, Oral, Daily  pantoprazole, 40 mg, Oral, BID AC  rosuvastatin, 10 mg, Oral, Daily  sertraline, 100 mg, Oral, Daily  sodium chloride, 10 mL, Intravenous, Q12H  sodium chloride, 3 mL, Intravenous, Q12H  tamsulosin, 0.4 mg, Oral, Daily      octreotide (SandoSTATIN) infusion, 25 mcg/hr, Last Rate: Stopped (22 1210)  sodium chloride, 100 mL/hr, Last Rate: Stopped (22 1120)      •  polyethylene glycol **AND** bisacodyl **AND** bisacodyl  •  dextrose  •  dextrose  •  glucagon (human recombinant)  •  HYDROmorphone  •  ondansetron **OR** ondansetron  •  [COMPLETED] Insert Peripheral IV **AND** sodium chloride  •  sodium chloride  •  sodium chloride  •  sodium chloride  •  sodium chloride    Family history:  Family History   Problem Relation Age of Onset   • Cirrhosis Mother    • Liver disease Sister    • Malig Hyperthermia Neg Hx        Social history:  Social History     Tobacco Use   • Smoking status: Former     Packs/day: 1.00     Years: 15.00     Pack years: 15.00     Types: Cigarettes     Start date: 1971     Quit date: 1984     Years since quittin.6   • Smokeless tobacco: Never   • Tobacco comments:     quit 20 plus years ago    Vaping Use   • Vaping Use: Never used   Substance Use Topics   • Alcohol use: Never   • Drug use: Not  Currently     Types: Marijuana       REVIEW OF SYSTEMS:  Constitutional - Negative for fevers, chills, lethargy  Eyes/Ears/Nose/Mouth/Throat - Negative for changes in vision or hearing  Cardiovascular - Negative for increased edema or cyanosis  Respiratory - Negative for dyspnea or wheezing  Gastrointestinal - Negative for nausea or vomiting  Genitourinary - Negative except as stated in HPI  Hematologic/Lymphatic - Negative for bruising or cyanosis  Skin - Negative for erythema or rash  Psych - Negative     Objective:  TMax 24 hours:   Temp (24hrs), Av °F (36.7 °C), Min:97.5 °F (36.4 °C), Max:98.4 °F (36.9 °C)      Vitals Ranges:   Temp:  [97.5 °F (36.4 °C)-98.4 °F (36.9 °C)] 98.1 °F (36.7 °C)  Heart Rate:  [68-94] 71  Resp:  [15-22] 18  BP: ()/(42-85) 108/51    Intake/Output Last 3 shifts:  I/O last 3 completed shifts:  In: 1100 [IV Piggyback:1100]  Out: -      Physical Exam:    General Appearance:    Alert, cooperative, NAD   HEENT:    No trauma, hearing intact   Lungs:     Respirations unlabored, no audible wheezing    Heart:    No cyanosis, No significant edema   Abdomen:     Soft, ND    :    No suprapubic distention or tenderness   Extremities:   No significant edema, no deformity   Lymphatic:   No neck or groin LAD   Skin:   No bleeding, bruising or rashes   Neuro/Psych:   Mood/affect pleasant, no focal findings       Results review:   I reviewed the patient's new clinical results.    Data review:  Lab Results (last 24 hours)     Procedure Component Value Units Date/Time    Extra Tubes [[id number]] Collected: 22 1150    Specimen: Blood from Arm, Left Updated: 22 1301    Narrative:      The following orders were created for panel order Extra Tubes.  Procedure                               Abnormality         Status                     ---------                               -----------         ------                     Light Blue Top[[id number]]                                   Final  result                 Please view results for these tests on the individual orders.    Light Blue Top [[id number]] Collected: 12/27/22 1150    Specimen: Blood from Arm, Left Updated: 12/27/22 1301     Extra Tube Hold for add-ons.     Comment: Auto resulted       POC Glucose Once [[id number]]  (Abnormal) Collected: 12/27/22 1134    Specimen: Blood Updated: 12/27/22 1227     Glucose 265 mg/dL      Comment: Serial Number: [id number]:  [id number]       Protime-INR [[id number]]  (Normal) Collected: 12/27/22 1150    Specimen: Blood from Arm, Left Updated: 12/27/22 1226     Protime 11.2 Seconds      INR 1.09    CBC (No Diff) [[id number]]  (Abnormal) Collected: 12/27/22 1150    Specimen: Blood from Arm, Left Updated: 12/27/22 1217     WBC 1.70 10*3/mm3      RBC 2.03 10*6/mm3      Hemoglobin 6.3 g/dL      Hematocrit 19.6 %      MCV 96.5 fL      MCH 31.1 pg      MCHC 32.3 g/dL      RDW 17.1 %      RDW-SD 60.8 fl      MPV 7.3 fL      Platelets 99 10*3/mm3     AFP Tumor Marker [[id number]] Collected: 12/27/22 1150    Specimen: Blood from Arm, Left Updated: 12/27/22 1201    Hemoglobin A1c [[id number]]  (Normal) Collected: 12/26/22 1148    Specimen: Blood from Arm, Left Updated: 12/27/22 1106     Hemoglobin A1C 5.1 %     Narrative:      Hemoglobin A1C Reference Range:    <5.7 %        Normal  5.7-6.4 %     Increased risk for diabetes  > 6.4 %        Diabetes       These guidelines have been recommended by the American Diabetic Association for Hgb A1c.      The following 2010 guidelines have been recommended by the American Diabetes Association for Hemoglobin A1c.    HBA1c 5.7-6.4% Increased risk for future diabetes (pre-diabetes)  HBA1c     >6.4% Diabetes      Urine Culture - Urine, Urine, Clean Catch [[id number]]  (Normal) Collected: 12/26/22 1305    Specimen: Urine, Clean Catch Updated: 12/27/22 1006     Urine Culture No growth    POC Glucose Once [[id number]]  (Abnormal) Collected: 12/27/22 0717    Specimen: Blood Updated:  12/27/22 0718     Glucose 318 mg/dL      Comment: Serial Number: [id number]:  [id number]       Manual Differential [[id number]]  (Abnormal) Collected: 12/27/22 0557    Specimen: Blood from Arm, Right Updated: 12/27/22 0709     Neutrophil % 80.0 %      Lymphocyte % 12.0 %      Monocyte % 1.0 %      Bands %  4.0 %      Metamyelocyte % 1.0 %      Atypical Lymphocyte % 2.0 %      Neutrophils Absolute 1.43 10*3/mm3      Lymphocytes Absolute 0.24 10*3/mm3      Monocytes Absolute 0.02 10*3/mm3      Anisocytosis Slight/1+     Dacrocytes Slight/1+     Poikilocytes Slight/1+     WBC Morphology Normal     Platelet Estimate Decreased    CBC & Differential [[id number]]  (Abnormal) Collected: 12/27/22 0557    Specimen: Blood from Arm, Right Updated: 12/27/22 0709    Narrative:      The following orders were created for panel order CBC & Differential.  Procedure                               Abnormality         Status                     ---------                               -----------         ------                     CBC Auto Differential[[id number]]        Abnormal            Final result               Scan Slide[[id number]]                                       Final result                 Please view results for these tests on the individual orders.    Scan Slide [[id number]] Collected: 12/27/22 0557    Specimen: Blood from Arm, Right Updated: 12/27/22 0709     Scan Slide --     Comment: See Manual Differential Results       CBC Auto Differential [[id number]]  (Abnormal) Collected: 12/27/22 0557    Specimen: Blood from Arm, Right Updated: 12/27/22 0709     WBC 1.70 10*3/mm3      RBC 1.82 10*6/mm3      Hemoglobin 5.7 g/dL      Hematocrit 18.2 %      Comment: Result checked          MCV 99.7 fL      MCH 31.2 pg      MCHC 31.3 g/dL      RDW 16.2 %      RDW-SD 57.8 fl      MPV 7.5 fL      Platelets 99 10*3/mm3     Narrative:      Modified report. Previous result was Hemogram on 12/27/2022 at 0635 EST.  The previously reported  component NRBC is no longer being reported. Previous result was 0.1 /100 WBC (Reference Range: 0.0-0.2 /100 WBC) on 12/27/2022 at 0635 EST.    Extra Tubes [[id number]] Collected: 12/27/22 0557    Specimen: Blood from Arm, Right Updated: 12/27/22 0701    Narrative:      The following orders were created for panel order Extra Tubes.  Procedure                               Abnormality         Status                     ---------                               -----------         ------                     Green Top (No Gel)[[id number]]                               Final result                 Please view results for these tests on the individual orders.    Green Top (No Gel) [[id number]] Collected: 12/27/22 0557    Specimen: Blood from Arm, Right Updated: 12/27/22 0701     Extra Tube Hold for add-ons.     Comment: Auto resulted.       Basic Metabolic Panel [[id number]]  (Abnormal) Collected: 12/27/22 0425    Specimen: Blood from Arm, Right Updated: 12/27/22 0533     Glucose 386 mg/dL      BUN 51 mg/dL      Creatinine 2.03 mg/dL      Sodium 132 mmol/L      Potassium 4.5 mmol/L      Chloride 102 mmol/L      CO2 22.0 mmol/L      Calcium 8.1 mg/dL      BUN/Creatinine Ratio 25.1     Anion Gap 8.0 mmol/L      eGFR 35.3 mL/min/1.73      Comment: National Kidney Foundation and American Society of Nephrology (ASN) Task Force recommended calculation based on the Chronic Kidney Disease Epidemiology Collaboration (CKD-EPI) equation refit without adjustment for race.       Narrative:      GFR Normal >60  Chronic Kidney Disease <60  Kidney Failure <15      POC Glucose Once [[id number]]  (Abnormal) Collected: 12/26/22 1800    Specimen: Blood Updated: 12/26/22 1801     Glucose 195 mg/dL      Comment: Serial Number: [id number]:  [id number]       Urinalysis, Microscopic Only - Urine, Clean Catch [[id number]]  (Abnormal) Collected: 12/26/22 1305    Specimen: Urine, Clean Catch Updated: 12/26/22 1328     RBC, UA Too Numerous to Count  /HPF      WBC, UA 6-12 /HPF      Bacteria, UA Trace /HPF      Squamous Epithelial Cells, UA 0-2 /HPF      Hyaline Casts, UA None Seen /LPF      Methodology Automated Microscopy    Urinalysis With Microscopic If Indicated (No Culture) - Urine, Clean Catch [[id number]]  (Abnormal) Collected: 12/26/22 1305    Specimen: Urine, Clean Catch Updated: 12/26/22 1322     Color, UA Yellow     Appearance, UA Cloudy     Comment: Result checked          pH, UA 5.5     Specific Gravity, UA 1.025     Glucose,  mg/dL (1+)     Ketones, UA Negative     Bilirubin, UA Moderate (2+)     Comment: Confirmation testing is unavailable.  A serum bilirubin is recommended for further assessment.        Blood, UA Large (3+)     Protein, UA >=300 mg/dL (3+)     Leuk Esterase, UA Negative     Nitrite, UA Negative     Urobilinogen, UA 4.0 E.U./dL           Imaging:  Imaging Results (Last 24 Hours)     Procedure Component Value Units Date/Time    CT Abdomen Pelvis Without Contrast [[id number]] Collected: 12/26/22 1714     Updated: 12/26/22 1719    Narrative:      CT ABDOMEN PELVIS WO CONTRAST-     Date of Exam: 12/26/2022 4:45 PM     Indication: right flank pain, 2mm stone found 12/22/2022;  R31.9-Hematuria, unspecified; D64.9-Anemia, unspecified;  R10.9-Unspecified abdominal pain.     Comparison Exams: 12/22/2022     Technique: Multiple axial images were obtained from the lung bases  through the symphysis pubis without IV contrast. Coronal and sagittal  reconstructions were performed.  Automated exposure control and iterative reconstruction methods were  used.     FINDINGS:  There is a moderate left pleural effusion.  There is mild left lower  lobe atelectasis.  Visualized right lung base is clear.  There is a  moderate amount of ascites.  The liver is small and nodular in  configuration compatible changes of cirrhosis.  No focal hepatic mass  identified.  There are multiple calcified granulomas scattered  throughout the liver.  There are  multiple calcified stones within the  gallbladder.  No evidence of biliary tract obstruction.  Pancreas is  within normal limits.  The spleen remains enlarged measuring up to 19 cm  in length.  Bilateral adrenal glands appear normal.  Left kidney appears  normal.  No evidence of left-sided hydronephrosis.  There is right-sided  perinephric fat stranding which is worsened from the prior exam.   Right-sided hydronephrosis is improved.  Previously demonstrated 2 mm  distal right ureteral stone is no longer visualized.  There is a  low-density mass of the upper pole the right kidney compatible with  cyst.  There are multiple mildly and enlarged mesenteric lymph nodes  which are unchanged.  No retroperitoneal adenopathy.  The upper GI tract  is within normal limits.     Pelvis: Large amount of ascites seen within the pelvis.  Urinary bladder  appears normal.  There are scattered colonic diverticula.  No evidence  of diverticulitis.  The appendix is normal.  No pelvic or inguinal  adenopathy.     There are degenerative changes of spine.  There are no lytic or  sclerotic bony lesions.       Impression:         1.  New right-sided perinephric fat stranding suggesting urinary tract  infection such as pyelonephritis.  There is been interval improvement in  right-sided hydronephrosis.  Previously demonstrated 2 mm right ureteral  stone is no longer visualized.  2.  Changes of cirrhosis with nodular appearance of the liver and  splenomegaly.  3.  Moderate to large amount of ascites throughout the abdomen and  pelvis similar prior exam.  4.  No change in moderate left pleural effusion.  5.  Cholelithiasis.     Electronically Signed By-Yong Harriosn MD On:12/26/2022 5:17 PM  This report was finalized on [id number] by  Yong Harrison MD.             Assessment:       Hematuria    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)    Anemia      Right ureteral stone passed  Possible UTI    Plan:     CT images reviewed, no residual stone  there is stranding consistent with recently passed stone versus UTI  Urine culture so far growth is negative  Recommend continue antibiotics for 1 week for full coverage  No acute intervention  Follow-up as outpatient 2 weeks      Carlos Early MD  [facility]  [facility], [address]  [place] [address]  Office: [phone]  Cell: [phone]  Also available via Epic Secure Chat  12/27/22  13:05 EST

## 2022-12-27 NOTE — CONSULTS
GI CONSULT  NOTE:    Referring Provider: JANAE John    Chief complaint: Anemia    Subjective .     History of present illness: Lei Mercado is a 67 y.o. 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  The patient states that he had paracentesis on 12/22 and states that he was told that his hemoglobin was 4.9 at that time.  He was sent to the ER and states that he received 3 units PRBCs.  The patient had been scheduled for outpatient EGD to evaluate his anemia on 12/28, but he has been feeling progressively weak and began having hematuria, so he presented to the ER for further evaluation and treatment.  Hemoglobin was found to be 5.7 on admission and 2 units PRBCs have been ordered.  The patient is well-known to our practice and is normally followed by Dr. Park on an outpatient basis.  He has a pending referral to the transplant team at [facility] with an appointment on 1/24/2023.  States that he has been requiring paracentesis nearly weekly despite oral diuretics.  Denies any bright red blood per rectum or melena.  No recent diarrhea.  He states that his last bowel movement was yesterday and was normal brown in color.  However, he has been having large amounts of hematuria.  He states that he is having nausea, but denies any vomiting.  No heartburn or dysphagia.  Denies any NSAID use.  He states that at home he had been having intermittent right upper quadrant pain, but he is now having left upper quadrant pain that is sharp in nature.  He is unable to identify any exacerbating or alleviating factors.  No recent fever or unintentional weight loss.      Endo History:  10/2018 Colonoscopy (Dr. Retana) - mucosal polyp, internal hemorrhoids, diverticulosis; 5 year recall    Past Medical History:  Past Medical History:   Diagnosis Date   •  "Anemia 2011   • Anesthesia complication     AWAKENED EARLY, ASPIRATION PNEUMONIA   • Cancer (HCC)     KIDNEY   • Cirrhosis (HCC)    • Colon polyp    • Diabetes mellitus (HCC)    • Diverticulitis of colon    • Esophageal varices (HCC)    • Fatty liver    • Gallstones    • Hyperlipidemia    • Hypertension    • Liver disease        Past Surgical History:  Past Surgical History:   Procedure Laterality Date   • COLONOSCOPY  approx 2018    benign polyps per pt    • ENDOSCOPY N/A 2022    Procedure: ESOPHAGOGASTRODUODENOSCOPY;  Surgeon: David Almeida MD;  Location: [facility] END[facility]COPY;  Service: Gastroenterology;  Laterality: N/A;  pre - ruq pain, hx parikh, cirrhosis  post - hiatal hernia   • LIVER BIOPSY     • NEPHRECTOMY Right     partial    • SHOULDER SURGERY      right shoulder \"reverse\" per pt    • UPPER GASTROINTESTINAL ENDOSCOPY  approx 2018    negative per pt        Social History:  Social History     Tobacco Use   • Smoking status: Former     Packs/day: 1.00     Years: 15.00     Pack years: 15.00     Types: Cigarettes     Start date: 1971     Quit date: 1984     Years since quittin.6   • Smokeless tobacco: Never   • Tobacco comments:     quit 20 plus years ago    Vaping Use   • Vaping Use: Never used   Substance Use Topics   • Alcohol use: Never   • Drug use: Not Currently     Types: Marijuana       Family History:  Family History   Problem Relation Age of Onset   • Cirrhosis Mother    • Liver disease Sister    • Malig Hyperthermia Neg Hx        Medications:  Medications Prior to Admission   Medication Sig Dispense Refill Last Dose   • esomeprazole (nexIUM) 40 MG capsule Take 40 mg by mouth Every Evening.      • metFORMIN ER (GLUCOPHAGE-XR) 500 MG 24 hr tablet Take 500 mg by mouth Every Evening.      • montelukast (SINGULAIR) 10 MG tablet Take 10 mg by mouth every night at bedtime.      • pioglitazone (ACTOS) 15 MG tablet Take 15 mg by mouth Every Evening.      • rosuvastatin (CRESTOR) 10 " MG tablet Take 10 mg by mouth Every Night.      • sertraline (ZOLOFT) 100 MG tablet Take 200 mg by mouth Every Evening.      • terazosin (HYTRIN) 10 MG capsule Take 10 mg by mouth Every Night.          Scheduled Meds:cefTRIAXone, 1 g, Intravenous, Q24H  insulin glargine, 10 Units, Subcutaneous, Nightly  insulin lispro, 2-9 Units, Subcutaneous, TID With Meals  montelukast, 10 mg, Oral, Daily  octreotide, 50 mcg, Intravenous, Once  pantoprazole, 40 mg, Oral, QAM  rosuvastatin, 10 mg, Oral, Daily  sertraline, 100 mg, Oral, Daily  sodium chloride, 10 mL, Intravenous, Q12H  sodium chloride, 3 mL, Intravenous, Q12H  tamsulosin, 0.4 mg, Oral, Daily      Continuous Infusions:octreotide (SandoSTATIN) infusion, 25 mcg/hr  sodium chloride, 100 mL/hr, Last Rate: 100 mL/hr (12/27/22 0920)      PRN Meds:.•  polyethylene glycol **AND** bisacodyl **AND** bisacodyl  •  dextrose  •  dextrose  •  glucagon (human recombinant)  •  HYDROmorphone  •  ondansetron **OR** ondansetron  •  [COMPLETED] Insert Peripheral IV **AND** sodium chloride  •  sodium chloride  •  sodium chloride  •  sodium chloride  •  sodium chloride    ALLERGIES:  Patient has no known allergies.    ROS:  The following systems were reviewed;   Constitution:  No fevers, chills, no unintentional weight loss  Skin: no rash, no jaundice  Eyes:  No blurry vision, no eye pain  HENT:  No change in hearing or smell  Resp:  No dyspnea or cough  CV:  No chest pain or palpitations  :  No dysuria, hematuria  Musculoskeletal:  No leg cramps or arthralgias  Neuro:  No tremor, no numbness  Psych:  No depression or confusion    Objective     Vital Signs:   Vitals:    12/26/22 2150 12/26/22 2300 12/27/22 0601 12/27/22 0925   BP: 147/69  113/67 93/58   BP Location: Left arm  Right arm Right arm   Patient Position: Sitting  Lying Lying   Pulse: 93 83 82 77   Resp: 16  17 18   Temp: 98.3 °F (36.8 °C)  97.5 °F (36.4 °C) 97.9 °F (36.6 °C)   TempSrc: Oral  Oral Oral   SpO2: 100%  100% 100%    Weight:       Height:           Physical Exam:       General Appearance:    Awake and alert, in no acute distress   Head:    Normocephalic, without obvious abnormality, atraumatic   Throat:   No oral lesions, no thrush, oral mucosa moist   Lungs:     Respirations regular, even and unlabored   Chest Wall:    No abnormalities observed   Abdomen:     Soft, LUQ tenderness, no rebound or guarding, mild distention   Rectal:     Deferred   Extremities:   Moves all extremities, bilateral lower extremity edema, no cyanosis   Pulses:   Pulses palpable and equal bilaterally   Skin:   No rash, no jaundice, normal palpation   Lymph nodes:   No cervical, supraclavicular or submandibular palpable adenopathy   Neurologic:   Cranial nerves 2 - 12 grossly intact, no asterixis       Results Review:   I reviewed the patient's labs and imaging.  CBC    Results from last 7 days   Lab Units 12/27/22  0557 12/26/22  1148 12/23/22  0723 12/23/22  0001 12/22/22  1630 12/22/22  1303   WBC 10*3/mm3 1.70* 3.70 2.20* 2.10* 1.60* 1.90*   HEMOGLOBIN g/dL 5.7* 7.5* 8.4* 6.8* 4.7* 4.9*   PLATELETS 10*3/mm3 99* 120* 106* 101* 92* 93*     CMP   Results from last 7 days   Lab Units 12/27/22  0425 12/26/22  1148 12/23/22  0723 12/22/22  1630   SODIUM mmol/L 132* 130* 133* 133*   POTASSIUM mmol/L 4.5 4.1 4.3 4.0   CHLORIDE mmol/L 102 98 103 100   CO2 mmol/L 22.0 21.0* 20.0* 22.0   BUN mg/dL 51* 51* 33* 33*   CREATININE mg/dL 2.03* 2.16* 2.16* 2.20*   GLUCOSE mg/dL 386* 441* 243* 302*   ALBUMIN g/dL  --  3.30*  --  4.00   BILIRUBIN mg/dL  --  5.0*  --  2.8*   ALK PHOS U/L  --  126*  --  100   AST (SGOT) U/L  --  10  --  7   ALT (SGPT) U/L  --  7  --  8   LIPASE U/L  --  49  --   --      Cr Clearance Estimated Creatinine Clearance: 44.8 mL/min (A) (by C-G formula based on SCr of 2.03 mg/dL (H)).  Coag   Results from last 7 days   Lab Units 12/26/22  1148 12/22/22  1630 12/22/22  1303   INR  1.14* 1.18* 1.18*   APTT seconds 34.5* 27.5*  --      HbA1C    Lab Results   Component Value Date    HGBA1C 7.9 (H) 05/09/2022    HGBA1C 6.1 (H) 03/02/2022    HGBA1C 5.9 (H) 09/18/2021     Blood Glucose   Glucose   Date/Time Value Ref Range Status   12/27/2022 0717 318 (H) 70 - 105 mg/dL Final     Comment:     Serial Number: [id number]:  [id number]   12/26/2022 1800 195 (H) 70 - 105 mg/dL Final     Comment:     Serial Number: [id number]:  [id number]     Infection     UA    Results from last 7 days   Lab Units 12/26/22  1305   NITRITE UA  Negative   WBC UA /HPF 6-12*   BACTERIA UA /HPF Trace*   SQUAM EPITHEL UA /HPF 0-2     Radiology(recent) CT Abdomen Pelvis Without Contrast    Result Date: 12/26/2022   1.  New right-sided perinephric fat stranding suggesting urinary tract infection such as pyelonephritis.  There is been interval improvement in right-sided hydronephrosis.  Previously demonstrated 2 mm right ureteral stone is no longer visualized. 2.  Changes of cirrhosis with nodular appearance of the liver and splenomegaly. 3.  Moderate to large amount of ascites throughout the abdomen and pelvis similar prior exam. 4.  No change in moderate left pleural effusion. 5.  Cholelithiasis.  Electronically Signed By-Yogn Harrison MD On:12/26/2022 5:17 PM This report was finalized on [id number] by  Yong Harrison MD.         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    CT abdomen/pelvis on admission shows new right-sided perinephric fat stranding  suggesting UTI such as pyelonephritis, cirrhosis with moderate to large amount of ascites, no change in moderate left pleural effusion, and cholelithiasis.  Hemoglobin 5.7 on admission.  2 units PRBCs have been ordered.  No evidence of overt GI bleeding.  Continue to monitor H/H and transfuse as needed.  Patient had been scheduled for outpatient EGD tomorrow. We will plan EGD today as inpatient for further evaluation of anemia.  Maintain NPO.  Consider colonoscopy if EGD is unremarkable.  However, hematuria must also be considered as an etiology of the patient's anemia.  WBC 1.7. ANC 1428.  Continue octreotide drip for now.  Increase PPI to twice daily dosing.  Total bilirubin 5.0, alk phos 126, AST and ALT normal.  Lipase normal.  Creatinine 2.03 from 2.16.  Not currently on diuretics.  Can consider restarting if renal function improves.  We will plan paracentesis.  Send fluid studies to rule out SBP.  Replace albumin as needed.  Plan RUQ US with Doppler to rule out portal vein thrombus.  Urology has been consulted for hematuria and UTI/pyelonephritis.  Patient has been referred to [facility] for transplant evaluation.  He has a pending appointment on 1/24/2023.  Supportive care.       I discussed the patients findings and my recommendations with the patient.  I will discuss case with Dr. Calixto and change the plan accordingly.    We appreciate the referral.    Electronically signed by DONALD English, 12/27/22, 10:02 AM EST.

## 2022-12-27 NOTE — CASE MANAGEMENT/SOCIAL WORK
Discharge Planning Assessment  [facility]     Patient Name: Lei Mercado  MRN: [id number]  Today's Date: 12/27/2022    Admit Date: 12/26/2022    Plan: home   Discharge Needs Assessment     Row Name 12/27/22 1132       Living Environment    People in Home alone    Current Living Arrangements home    Primary Care Provided by self    Provides Primary Care For no one    Family Caregiver if Needed none    Quality of Family Relationships helpful    Able to Return to Prior Arrangements yes       Resource/Environmental Concerns    Resource/Environmental Concerns none    Transportation Concerns none       Transition Planning    Patient/Family Anticipates Transition to home    Transportation Anticipated car, drives self;family or friend will provide       Discharge Needs Assessment    Equipment Currently Used at Home glucometer    Concerns to be Addressed denies needs/concerns at this time;no discharge needs identified    Anticipated Changes Related to Illness none    Equipment Needed After Discharge none    Provided Post Acute Provider List? N/A    N/A Provider List Comment denies dc needs at this time    Provided Post Acute Provider Quality & Resource List? N/A               Discharge Plan     Row Name 12/27/22 1133       Plan    Plan home    Patient/Family in Agreement with Plan yes    Plan Comments Spoke withpatient at bedside. He denies dc needs at this time. PCP and pharmacy verified. Barriers to dc:AM Hgb today of 5.7 from 7.5 on  admission. . 2U PRBCs ordered; 1U blood transfusing.  Kept NPO for EGD today. octreotide drip. RUQ US with Doppler ordered per GI to rule out portal vein thrombus and plan for bedside paracentesis.              Continued Care and Services - Admitted Since 12/26/2022    Coordination has not been started for this encounter.       Expected Discharge Date and Time     Expected Discharge Date Expected Discharge Time    Dec 28, 2022          Demographic Summary     Row Name 12/27/22 1132        General Information    Admission Type observation    Arrived From emergency department    Required Notices Provided Observation Status Notice    Referral Source admission list    Reason for Consult discharge planning    Preferred Language English               Functional Status     Row Name 12/27/22 1132       Functional Status    Usual Activity Tolerance moderate    Current Activity Tolerance moderate       Functional Status, IADL    Medications independent    Meal Preparation independent    Housekeeping independent    Laundry independent    Shopping independent       Mental Status    General Appearance WDL WDL       Mental Status Summary    Recent Changes in Mental Status/Cognitive Functioning no changes                         Zina Guthrie RN

## 2022-12-27 NOTE — SIGNIFICANT NOTE
12/27/22 1434   OTHER   Discipline physical therapist   Rehab Time/Intention   Session Not Performed patient unavailable for evaluation  (at time of attempt was getting blood with low hgb, will follow up for PT eval 12/28)   Recommendation   PT - Next Appointment 12/28/22

## 2022-12-27 NOTE — PLAN OF CARE
Goal Outcome Evaluation:    Patient waiting for urology's recommendations. IVF infusing. IV abx given per MAR. BG monitoring with ISS coverage.

## 2022-12-27 NOTE — H&P
[facility] Observation Unit H&P    Patient Name: Lei Mercado  : 1955  MRN: [id number]  Primary Care Physician: Froylan June  Date of admission: 2022     Patient Care Team:  Froylan June as PCP - General (Preventative Medicine)          Subjective   History Present Illness     Chief Complaint:   Chief Complaint   Patient presents with   • Flank Pain   • Blood in Urine         History of Present Illness  Obtained from ED provider HPI on 2022:  HPI: Patient is a 67-year-old male who presents to the ER today by private vehicle has a known history of Cyr and macrocytic anemia, states that he has had some right-sided abdominal pain that is now into his right flank he reports hematuria with clots.  States he was recently in the emergency room following of paracentesis, he had a hemoglobin of 4 and received multiple transfusions and was admitted to observation.  He has had no nausea or vomiting no diarrhea or constipation.  States he has been told he has a kidney stone in the past.    2022:  Patient confirms the HPI noted above including recent right-sided flank and abdomen pain with subsequent report of hematuria including the passage of blood clots.  He confirms recent paracentesis as well as transfusion of 3 units of blood.  The time of exam he does report that he has had some nausea without vomiting but denies any dyspnea, cough, fever.  His abdominal distention is reported as somewhat increased and he notes that he is requiring paracentesis more frequently.  He does have outpatient appointment with specialist in [place] within the next month to evaluate possible liver transplantation.  No hematemesis, melena or hematochezia is reported.        Review of Systems   Constitutional: Positive for malaise/fatigue. Negative for fever.   HENT: Negative.    Eyes: Negative.    Cardiovascular: Negative.    Respiratory: Negative.    Skin: Negative.    Gastrointestinal: Positive for bloating, abdominal pain  "and nausea. Negative for hematemesis, hematochezia, melena and vomiting.   Genitourinary: Positive for flank pain.   Neurological: Negative.    Psychiatric/Behavioral: Negative.            Personal History     Past Medical History:   Past Medical History:   Diagnosis Date   • Anemia 09/2011   • Anesthesia complication     AWAKENED EARLY, ASPIRATION PNEUMONIA   • Cancer (HCC)     KIDNEY   • Cirrhosis (HCC)    • Colon polyp    • Diabetes mellitus (HCC)    • Diverticulitis of colon    • Esophageal varices (HCC)    • Fatty liver    • Gallstones    • Hyperlipidemia    • Hypertension    • Liver disease        Surgical History:      Past Surgical History:   Procedure Laterality Date   • COLONOSCOPY  approx 2018    benign polyps per pt    • ENDOSCOPY N/A 02/28/2022    Procedure: ESOPHAGOGASTRODUODENOSCOPY;  Surgeon: David Almeida MD;  Location: [facility] ENDOSCOPY;  Service: Gastroenterology;  Laterality: N/A;  pre - ruq pain, hx parikh, cirrhosis  post - hiatal hernia   • LIVER BIOPSY     • NEPHRECTOMY Right     partial    • SHOULDER SURGERY      right shoulder \"reverse\" per pt    • UPPER GASTROINTESTINAL ENDOSCOPY  approx 2018    negative per pt            Family History: family history includes Cirrhosis in his mother; Liver disease in his sister. Otherwise pertinent FHx was reviewed and unremarkable.     Social History:  reports that he quit smoking about 38 years ago. His smoking use included cigarettes. He started smoking about 51 years ago. He has a 15.00 pack-year smoking history. He has never used smokeless tobacco. He reports that he does not currently use drugs after having used the following drugs: Marijuana. He reports that he does not drink alcohol.      Medications:  Prior to Admission medications    Medication Sig Start Date End Date Taking? Authorizing Provider   esomeprazole (nexIUM) 40 MG capsule Take 40 mg by mouth Every Evening. 3/7/22  Yes Provider, MD Francois   metFORMIN ER (GLUCOPHAGE-XR) 500 " MG 24 hr tablet Take 500 mg by mouth Every Evening. 11/5/21  Yes ProviderFrancois MD   montelukast (SINGULAIR) 10 MG tablet Take 10 mg by mouth every night at bedtime. 5/3/22  Yes ProviderFrancois MD   pioglitazone (ACTOS) 15 MG tablet Take 15 mg by mouth Every Evening. 3/8/22  Yes Francois Webb MD   rosuvastatin (CRESTOR) 10 MG tablet Take 10 mg by mouth Every Night. 3/8/22  Yes ProviderFrancois MD   sertraline (ZOLOFT) 100 MG tablet Take 200 mg by mouth Every Evening. 3/8/22  Yes Francois Webb MD   terazosin (HYTRIN) 10 MG capsule Take 10 mg by mouth Every Night. 3/7/22  Yes Provider, MD Francois       Allergies:  No Known Allergies    Objective   Objective     Vital Signs  Temp:  [97.5 °F (36.4 °C)-98.4 °F (36.9 °C)] 97.7 °F (36.5 °C)  Heart Rate:  [68-94] 89  Resp:  [15-22] 18  BP: ()/(42-85) 138/49  SpO2:  [96 %-100 %] 100 %  on  Flow (L/min):  [6-12] 6;   Device (Oxygen Therapy): room air  Body mass index is 28.63 kg/m².    Physical Exam  Vitals reviewed.   Constitutional:       General: He is not in acute distress.     Appearance: Normal appearance. He is normal weight. He is not ill-appearing, toxic-appearing or diaphoretic.   HENT:      Head: Normocephalic.      Right Ear: External ear normal.      Left Ear: External ear normal.      Nose: Nose normal.      Mouth/Throat:      Mouth: Mucous membranes are moist.   Eyes:      Extraocular Movements: Extraocular movements intact.   Cardiovascular:      Rate and Rhythm: Normal rate and regular rhythm.      Pulses: Normal pulses.      Heart sounds: Normal heart sounds.   Pulmonary:      Effort: Pulmonary effort is normal.      Breath sounds: Normal breath sounds.   Abdominal:      General: Bowel sounds are normal.      Palpations: Abdomen is soft.      Tenderness: There is no abdominal tenderness.   Musculoskeletal:         General: Normal range of motion.      Cervical back: Normal range of motion.      Right lower leg: No  edema.      Left lower leg: No edema.   Skin:     General: Skin is warm and dry.      Capillary Refill: Capillary refill takes less than 2 seconds.      Coloration: Skin is pale.   Neurological:      General: No focal deficit present.      Mental Status: He is alert and oriented to person, place, and time.   Psychiatric:         Mood and Affect: Mood normal.         Behavior: Behavior normal.         Thought Content: Thought content normal.         Judgment: Judgment normal.           Results Review:  I have personally reviewed most recent cardiac tracings, lab results and radiology images and interpretations and agree with findings, most notably: CMP, CBC, UA, CT of abdomen and pelvis and ultrasound of liver.    Results from last 7 days   Lab Units 12/27/22  1150   WBC 10*3/mm3 1.70*   HEMOGLOBIN g/dL 6.3*   HEMATOCRIT % 19.6*   PLATELETS 10*3/mm3 99*   INR  1.09     Results from last 7 days   Lab Units 12/27/22  0425 12/26/22  1148   SODIUM mmol/L 132* 130*   POTASSIUM mmol/L 4.5 4.1   CHLORIDE mmol/L 102 98   CO2 mmol/L 22.0 21.0*   BUN mg/dL 51* 51*   CREATININE mg/dL 2.03* 2.16*   GLUCOSE mg/dL 386* 441*   CALCIUM mg/dL 8.1* 8.7   ALT (SGPT) U/L  --  7   AST (SGOT) U/L  --  10     Estimated Creatinine Clearance: 44.8 mL/min (A) (by C-G formula based on SCr of 2.03 mg/dL (H)).  Brief Urine Lab Results  (Last result in the past 365 days)      Color   Clarity   Blood   Leuk Est   Nitrite   Protein   CREAT   Urine HCG        12/26/22 1305 Yellow   Cloudy  Comment: Result checked     Large (3+)   Negative   Negative   >=300 mg/dL (3+)                 Microbiology Results (last 10 days)     Procedure Component Value - Date/Time    Urine Culture - Urine, Urine, Clean Catch [[id number]]  (Normal) Collected: 12/26/22 1305    Lab Status: Preliminary result Specimen: Urine, Clean Catch Updated: 12/27/22 1006     Urine Culture No growth          ECG/EMG Results (most recent)     Procedure Component Value Units Date/Time     SCANNED - TELEMETRY   [[id number]] Resulted: 12/26/22     Updated: 12/27/22 1112              Results for orders placed during the hospital encounter of 08/11/22    Adult Transthoracic Echo Complete W/ Cont if Necessary Per Protocol    Interpretation Summary  · Left ventricular systolic function is normal.  · Left ventricular ejection fraction is 55 to 60%  · Left ventricular wall thickness is consistent with mild concentric hypertrophy.  · Left ventricular diastolic function is consistent with (grade I) impaired relaxation.      CT Abdomen Pelvis Without Contrast    Result Date: 12/26/2022   1.  New right-sided perinephric fat stranding suggesting urinary tract infection such as pyelonephritis.  There is been interval improvement in right-sided hydronephrosis.  Previously demonstrated 2 mm right ureteral stone is no longer visualized. 2.  Changes of cirrhosis with nodular appearance of the liver and splenomegaly. 3.  Moderate to large amount of ascites throughout the abdomen and pelvis similar prior exam. 4.  No change in moderate left pleural effusion. 5.  Cholelithiasis.  Electronically Signed By-Yong Harrison MD On:12/26/2022 5:17 PM This report was finalized on [id number] by  Yong Harrison MD.    CT Abdomen Pelvis Without Contrast    Result Date: 12/22/2022  New right-sided hydroureteronephrosis, mild and felt to be secondary to a faint 2 mm stone in the distal right ureter. Liver morphology of cirrhosis, diffuse ascites and splenomegaly are stable findings. There is a new left pleural effusion.    Electronically Signed By-Gage Jean Baptiste MD On:12/22/2022 7:50 PM This report was finalized on [id number] by  Gage Jean Baptiste MD.    US Liver    Result Date: 12/27/2022   1. Changes of cirrhosis and ascites.  Electronically Signed By-Larry Bowen MD On:12/27/2022 1:47 PM This report was finalized on [id number] by  Larry Bowen MD.        Estimated Creatinine Clearance: 44.8 mL/min (A) (by C-G formula based  on SCr of 2.03 mg/dL (H)).    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     Anemia with hematuria and history of cirrhosis  -Hemoglobin: 5.7 with an increased MCV and decreased MCHC  -Platelets: 99  -Hematuria reported  -CT of abdomen and pelvis shows new right-sided perinephric stranding suggesting urinary tract infection such as pyelonephritis with interval improvement of hydronephrosis and previously demonstrated right ureteral stone no longer visualized  -Moderate to large amount of ascites throughout the abdomen  -Octreotide started empirically and GI consulted, EGD performed with abnormal mucosa throughout the whole stomach consistent with moderate portal hypertensive gastropathy and friable mucosa noted and recommendations to complete current octreotide bag and then discontinue, continue with transfusions and initiate full liquid diet with continued monitoring of hemoglobin while transfusing and consideration for repeat colonoscopy if hemoglobin continues to decline or overt bleeding develops  -Paracentesis ordered  -Twice daily PPI  -Allergy recommends 1 week antibiotic coverage and follow-up outpatient in 2 weeks  -Hospitalist consulted for further management    Acute on chronic kidney injury  Lab Results   Component Value Date    CREATININE 2.03 (H) 12/27/2022    BUN 51 (H) 12/27/2022    BCR 25.1 (H) 12/27/2022   -Creatinine: 1.40 on 8/11/2022  -Continue to hold diuretics  -Avoid nephrotoxic medication IV dye unless urgently needed  -Monitor BMP and I's and O's while admitted    Leukopenia  -WBCs: 1.70  -Monitor while admitted    Diabetes mellitus  -Poorly controlled   Lab Results   Component Value Date    GLUCOSE 386 (H) 12/27/2022    GLUCOSE 441 (C) 12/26/2022    GLUCOSE  243 (H) 12/23/2022    GLUCOSE 302 (H) 12/22/2022   -Hold metformin and pioglitazone  -Basal and correctional insulin ordered  -Diabetic diet  -Monitor AC and HS    Hyperlipidemia  -Statin    Depression  -Zoloft    GERD  -PPI          VTE Prophylaxis -   Mechanical Order History:      Ordered        12/26/22 1748  Place Sequential Compression Device  Once            12/26/22 1748  Maintain Sequential Compression Device  Continuous                    Pharmalogical Order History:     None          CODE STATUS:    Code Status and Medical Interventions:   Ordered at: 12/26/22 1604     Level Of Support Discussed With:    Patient     Code Status (Patient has no pulse and is not breathing):    CPR (Attempt to Resuscitate)     Medical Interventions (Patient has pulse or is breathing):    Full Support       This patient has been examined wearing personal protective equipment.     I discussed the patient's findings and my recommendations with patient and nursing staff.      Signature:Electronically signed by Gilles Green PA-C, 12/27/22, 3:20 PM EST.

## 2022-12-27 NOTE — OP NOTE
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  Portal hypertensive gastropathy  2.  Gastric polyps  3.  Hiatal hernia  4.  Normal mucosa of the esophagus  5.  Normal mucosa of the duodenum      Procedure/CPT® Codes:      Procedure(s):  ESOPHAGOGASTRODUODENOSCOPY    Staff:  Surgeon(s):  TANVIR Calixto MD      Anesthesia: Monitored Anesthesia Care    Description of Procedure:  A description of the procedure as well as risks, benefits and alternative methods were explained to the patient who voiced understanding and signed the corresponding consent form. A physical exam was performed and vital signs were monitored throughout the procedure.    An upper GI endoscope was placed into the mouth and proceeded through the esophagus, stomach and second portion of the duodenum without difficulty. The scope was then retroflexed and the fundus was visualized. The procedure was not difficult and there were no immediate complications.  There was no blood loss.    Impression:  1.  Abnormal mucosa of the whole stomach with diffuse continuous mosaic pattern mucosa with patchy erythema and associated nodularity consistent with moderate portal hypertensive gastropathy.  The mucosa was very friable with mild contact bleeding.  There was no blood in the stomach, and no active bleeding.  2.  Several small sessile polyps in the stomach body and fundus consistent with fundic gland polyps.  3.  Small sliding hiatal hernia  4.  Normal mucosa of the whole esophagus.  There were no esophageal varices  5.  Normal mucosa in the duodenal bulb and second and third portion of the duodenum.   There was friable mucosa with mild contact bleeding, but no blood in the duodenum, and no active bleeding.    Recommendations:  -Anemia may be due to slow GI blood loss from portal hypertensive gastropathy.  There is no active upper GI bleeding at this time.  Consider anemia also may be due to hematuria, he is being evaluated by urology.  -Okay for full liquid diet today and continue advancing as tolerated   -Repeat hemoglobin after second unit of blood  -Trend hemoglobin daily and continue transfusing if hgb less than 7  -If further decline in hemoglobin or if overt bleeding would consider repeating his colonoscopy.  -Consider starting nadolol for portal hypertension if tolerated by blood pressure  -Continue PPI twice daily.  Can complete current bag of octreotide and then okay to discontinue.  -Plan for paracentesis and abdominal ultrasound as per consult note.     -Recall EGD 2 years w/ Dr Park for EV screening  -MELD-Na= 25 at present. Has f/u with Transplant team next month.      EDITA Calixto MD     Date: 12/27/2022    Time: 12:43 EST

## 2022-12-28 ENCOUNTER — INPATIENT HOSPITAL (AMBULATORY)
Dept: URBAN - METROPOLITAN AREA HOSPITAL 84 | Facility: HOSPITAL | Age: 67
End: 2022-12-28

## 2022-12-28 DIAGNOSIS — R74.01 ELEVATION OF LEVELS OF LIVER TRANSAMINASE LEVELS: ICD-10-CM

## 2022-12-28 DIAGNOSIS — R18.8 OTHER ASCITES: ICD-10-CM

## 2022-12-28 DIAGNOSIS — D53.9 NUTRITIONAL ANEMIA, UNSPECIFIED: ICD-10-CM

## 2022-12-28 DIAGNOSIS — D72.819 DECREASED WHITE BLOOD CELL COUNT, UNSPECIFIED: ICD-10-CM

## 2022-12-28 DIAGNOSIS — K75.81 NONALCOHOLIC STEATOHEPATITIS (NASH): ICD-10-CM

## 2022-12-28 DIAGNOSIS — R10.12 LEFT UPPER QUADRANT PAIN: ICD-10-CM

## 2022-12-28 DIAGNOSIS — Z85.528 PERSONAL HISTORY OF OTHER MALIGNANT NEOPLASM OF KIDNEY: ICD-10-CM

## 2022-12-28 DIAGNOSIS — K62.5 HEMORRHAGE OF ANUS AND RECTUM: ICD-10-CM

## 2022-12-28 PROBLEM — R31.9 HEMATURIA, UNSPECIFIED TYPE: Status: ACTIVE | Noted: 2022-12-28

## 2022-12-28 LAB
ALBUMIN FLD-MCNC: 1.1 G/DL
ALBUMIN SERPL-MCNC: 2.9 G/DL (ref 3.5–5.2)
ALBUMIN/GLOB SERPL: 1 G/DL
ALP SERPL-CCNC: 120 U/L (ref 39–117)
ALT SERPL W P-5'-P-CCNC: 6 U/L (ref 1–41)
AMMONIA BLD-SCNC: 28 UMOL/L (ref 16–60)
ANION GAP SERPL CALCULATED.3IONS-SCNC: 7 MMOL/L (ref 5–15)
APPEARANCE FLD: CLEAR
AST SERPL-CCNC: 9 U/L (ref 1–40)
BASOPHILS # BLD AUTO: 0 10*3/MM3 (ref 0–0.2)
BASOPHILS NFR BLD AUTO: 1 % (ref 0–1.5)
BH BB BLOOD EXPIRATION DATE: NORMAL
BH BB BLOOD EXPIRATION DATE: NORMAL
BH BB BLOOD TYPE BARCODE: 600
BH BB BLOOD TYPE BARCODE: 600
BH BB DISPENSE STATUS: NORMAL
BH BB DISPENSE STATUS: NORMAL
BH BB PRODUCT CODE: NORMAL
BH BB PRODUCT CODE: NORMAL
BH BB UNIT NUMBER: NORMAL
BH BB UNIT NUMBER: NORMAL
BILIRUB SERPL-MCNC: 2.8 MG/DL (ref 0–1.2)
BUN SERPL-MCNC: 46 MG/DL (ref 8–23)
BUN/CREAT SERPL: 23.6 (ref 7–25)
CALCIUM SPEC-SCNC: 8.1 MG/DL (ref 8.6–10.5)
CHLORIDE SERPL-SCNC: 101 MMOL/L (ref 98–107)
CO2 SERPL-SCNC: 22 MMOL/L (ref 22–29)
COLOR FLD: YELLOW
CREAT SERPL-MCNC: 1.95 MG/DL (ref 0.76–1.27)
CROSSMATCH INTERPRETATION: NORMAL
CROSSMATCH INTERPRETATION: NORMAL
DAT POLY-SP REAG RBC QL: NEGATIVE
DEPRECATED RDW RBC AUTO: 59.5 FL (ref 37–54)
DEPRECATED RDW RBC AUTO: NORMAL FL
EGFRCR SERPLBLD CKD-EPI 2021: 37 ML/MIN/1.73
EOSINOPHIL # BLD AUTO: 0 10*3/MM3 (ref 0–0.4)
EOSINOPHIL NFR BLD AUTO: 2 % (ref 0.3–6.2)
ERYTHROCYTE [DISTWIDTH] IN BLOOD BY AUTOMATED COUNT: 16.8 % (ref 12.3–15.4)
ERYTHROCYTE [DISTWIDTH] IN BLOOD BY AUTOMATED COUNT: NORMAL %
FERRITIN SERPL-MCNC: 791.9 NG/ML (ref 30–400)
FOLATE SERPL-MCNC: 4.42 NG/ML (ref 4.78–24.2)
GLOBULIN UR ELPH-MCNC: 2.9 GM/DL
GLUCOSE BLDC GLUCOMTR-MCNC: 329 MG/DL (ref 70–105)
GLUCOSE BLDC GLUCOMTR-MCNC: 356 MG/DL (ref 70–105)
GLUCOSE BLDC GLUCOMTR-MCNC: 394 MG/DL (ref 70–105)
GLUCOSE BLDC GLUCOMTR-MCNC: 417 MG/DL (ref 70–105)
GLUCOSE SERPL-MCNC: 409 MG/DL (ref 65–99)
HBA1C MFR BLD: 5 % (ref 3.5–5.6)
HCT VFR BLD AUTO: 20.6 % (ref 37.5–51)
HCT VFR BLD AUTO: 21.2 % (ref 37.5–51)
HCT VFR BLD AUTO: 21.3 % (ref 37.5–51)
HCT VFR BLD AUTO: 22.4 % (ref 37.5–51)
HCT VFR BLD AUTO: 23.1 % (ref 37.5–51)
HCT VFR BLD AUTO: NORMAL %
HGB BLD-MCNC: 6.5 G/DL (ref 13–17.7)
HGB BLD-MCNC: 7 G/DL (ref 13–17.7)
HGB BLD-MCNC: 7.1 G/DL (ref 13–17.7)
HGB BLD-MCNC: 7.1 G/DL (ref 13–17.7)
HGB BLD-MCNC: 7.4 G/DL (ref 13–17.7)
HGB BLD-MCNC: NORMAL G/DL
INR PPP: 1.2 (ref 0.93–1.1)
LDH SERPL-CCNC: 264 U/L (ref 135–225)
LYMPHOCYTES # BLD AUTO: 0.4 10*3/MM3 (ref 0.7–3.1)
LYMPHOCYTES NFR BLD AUTO: 17 % (ref 19.6–45.3)
LYMPHOCYTES NFR FLD MANUAL: 48 %
MCH RBC QN AUTO: 31.7 PG (ref 26.6–33)
MCH RBC QN AUTO: NORMAL PG
MCHC RBC AUTO-ENTMCNC: 33.1 G/DL (ref 31.5–35.7)
MCHC RBC AUTO-ENTMCNC: NORMAL G/DL
MCV RBC AUTO: 95.8 FL (ref 79–97)
MCV RBC AUTO: NORMAL FL
MESOTHL CELL NFR FLD MANUAL: 6 %
METHOD: NORMAL
MONOCYTES # BLD AUTO: 0.2 10*3/MM3 (ref 0.1–0.9)
MONOCYTES NFR BLD AUTO: 8.7 % (ref 5–12)
MONOCYTES NFR FLD: 33 %
NEUTROPHILS NFR BLD AUTO: 1.5 10*3/MM3 (ref 1.7–7)
NEUTROPHILS NFR BLD AUTO: 71.3 % (ref 42.7–76)
NEUTROPHILS NFR FLD MANUAL: 13 %
NRBC BLD AUTO-RTO: 0.3 /100 WBC (ref 0–0.2)
NUC CELL # FLD: 131 /MM3
PLATELET # BLD AUTO: 100 10*3/MM3 (ref 140–450)
PLATELET # BLD AUTO: NORMAL 10*3/UL
PMV BLD AUTO: 7.6 FL (ref 6–12)
PMV BLD AUTO: NORMAL FL
POTASSIUM SERPL-SCNC: 4.8 MMOL/L (ref 3.5–5.2)
PROT FLD-MCNC: 2.1 G/DL
PROT SERPL-MCNC: 5.8 G/DL (ref 6–8.5)
PROTHROMBIN TIME: 12.2 SECONDS (ref 9.6–11.7)
RBC # BLD AUTO: 2.21 10*6/MM3 (ref 4.14–5.8)
RBC # BLD AUTO: NORMAL 10*6/UL
RETICS # AUTO: 0.12 10*6/MM3 (ref 0.02–0.13)
RETICS/RBC NFR AUTO: 4.89 % (ref 0.7–1.9)
SODIUM SERPL-SCNC: 130 MMOL/L (ref 136–145)
UNIT  ABO: NORMAL
UNIT  ABO: NORMAL
UNIT  RH: NORMAL
UNIT  RH: NORMAL
WBC NRBC COR # BLD: 2.1 10*3/MM3 (ref 3.4–10.8)
WBC NRBC COR # BLD: NORMAL 10*3/UL

## 2022-12-28 PROCEDURE — 82042 OTHER SOURCE ALBUMIN QUAN EA: CPT | Performed by: NURSE PRACTITIONER

## 2022-12-28 PROCEDURE — 82962 GLUCOSE BLOOD TEST: CPT

## 2022-12-28 PROCEDURE — 25010000002 OCTREOTIDE PER 25 MCG: Performed by: PHYSICIAN ASSISTANT

## 2022-12-28 PROCEDURE — 85045 AUTOMATED RETICULOCYTE COUNT: CPT | Performed by: INTERNAL MEDICINE

## 2022-12-28 PROCEDURE — 89051 BODY FLUID CELL COUNT: CPT | Performed by: NURSE PRACTITIONER

## 2022-12-28 PROCEDURE — 97161 PT EVAL LOW COMPLEX 20 MIN: CPT

## 2022-12-28 PROCEDURE — 82728 ASSAY OF FERRITIN: CPT | Performed by: INTERNAL MEDICINE

## 2022-12-28 PROCEDURE — 80053 COMPREHEN METABOLIC PANEL: CPT | Performed by: NURSE PRACTITIONER

## 2022-12-28 PROCEDURE — 63710000001 INSULIN GLARGINE PER 5 UNITS: Performed by: PHYSICIAN ASSISTANT

## 2022-12-28 PROCEDURE — 87070 CULTURE OTHR SPECIMN AEROBIC: CPT | Performed by: NURSE PRACTITIONER

## 2022-12-28 PROCEDURE — 83615 LACTATE (LD) (LDH) ENZYME: CPT | Performed by: INTERNAL MEDICINE

## 2022-12-28 PROCEDURE — 63710000001 INSULIN LISPRO (HUMAN) PER 5 UNITS: Performed by: NURSE PRACTITIONER

## 2022-12-28 PROCEDURE — 82140 ASSAY OF AMMONIA: CPT | Performed by: NURSE PRACTITIONER

## 2022-12-28 PROCEDURE — 85018 HEMOGLOBIN: CPT | Performed by: FAMILY MEDICINE

## 2022-12-28 PROCEDURE — 25010000002 CEFTRIAXONE PER 250 MG: Performed by: PHYSICIAN ASSISTANT

## 2022-12-28 PROCEDURE — P9047 ALBUMIN (HUMAN), 25%, 50ML: HCPCS | Performed by: NURSE PRACTITIONER

## 2022-12-28 PROCEDURE — 83036 HEMOGLOBIN GLYCOSYLATED A1C: CPT | Performed by: FAMILY MEDICINE

## 2022-12-28 PROCEDURE — 25010000002 ALBUMIN HUMAN 25% PER 50 ML: Performed by: NURSE PRACTITIONER

## 2022-12-28 PROCEDURE — 85025 COMPLETE CBC W/AUTO DIFF WBC: CPT | Performed by: FAMILY MEDICINE

## 2022-12-28 PROCEDURE — 84165 PROTEIN E-PHORESIS SERUM: CPT | Performed by: NURSE PRACTITIONER

## 2022-12-28 PROCEDURE — 86880 COOMBS TEST DIRECT: CPT | Performed by: NURSE PRACTITIONER

## 2022-12-28 PROCEDURE — 25010000002 NA FERRIC GLUC CPLX PER 12.5 MG: Performed by: NURSE PRACTITIONER

## 2022-12-28 PROCEDURE — 86900 BLOOD TYPING SEROLOGIC ABO: CPT

## 2022-12-28 PROCEDURE — 36430 TRANSFUSION BLD/BLD COMPNT: CPT

## 2022-12-28 PROCEDURE — 85014 HEMATOCRIT: CPT | Performed by: FAMILY MEDICINE

## 2022-12-28 PROCEDURE — 99222 1ST HOSP IP/OBS MODERATE 55: CPT | Performed by: INTERNAL MEDICINE

## 2022-12-28 PROCEDURE — 0W9G3ZZ DRAINAGE OF PERITONEAL CAVITY, PERCUTANEOUS APPROACH: ICD-10-PCS | Performed by: INTERNAL MEDICINE

## 2022-12-28 PROCEDURE — 85610 PROTHROMBIN TIME: CPT | Performed by: NURSE PRACTITIONER

## 2022-12-28 PROCEDURE — P9016 RBC LEUKOCYTES REDUCED: HCPCS

## 2022-12-28 PROCEDURE — 99232 SBSQ HOSP IP/OBS MODERATE 35: CPT | Performed by: NURSE PRACTITIONER

## 2022-12-28 PROCEDURE — 87205 SMEAR GRAM STAIN: CPT | Performed by: NURSE PRACTITIONER

## 2022-12-28 PROCEDURE — 84157 ASSAY OF PROTEIN OTHER: CPT | Performed by: NURSE PRACTITIONER

## 2022-12-28 RX ORDER — SERTRALINE HYDROCHLORIDE 100 MG/1
200 TABLET, FILM COATED ORAL DAILY
Status: DISCONTINUED | OUTPATIENT
Start: 2022-12-28 | End: 2022-12-29 | Stop reason: HOSPADM

## 2022-12-28 RX ORDER — CHOLECALCIFEROL (VITAMIN D3) 125 MCG
5 CAPSULE ORAL NIGHTLY PRN
Status: DISCONTINUED | OUTPATIENT
Start: 2022-12-28 | End: 2022-12-29 | Stop reason: HOSPADM

## 2022-12-28 RX ADMIN — Medication 10 ML: at 20:57

## 2022-12-28 RX ADMIN — INSULIN LISPRO 8 UNITS: 100 INJECTION, SOLUTION INTRAVENOUS; SUBCUTANEOUS at 12:20

## 2022-12-28 RX ADMIN — SODIUM CHLORIDE 250 MG: 9 INJECTION, SOLUTION INTRAVENOUS at 12:20

## 2022-12-28 RX ADMIN — INSULIN LISPRO 8 UNITS: 100 INJECTION, SOLUTION INTRAVENOUS; SUBCUTANEOUS at 09:27

## 2022-12-28 RX ADMIN — CEFTRIAXONE 1 G: 1 INJECTION, POWDER, FOR SOLUTION INTRAMUSCULAR; INTRAVENOUS at 09:15

## 2022-12-28 RX ADMIN — INSULIN LISPRO 8 UNITS: 100 INJECTION, SOLUTION INTRAVENOUS; SUBCUTANEOUS at 17:55

## 2022-12-28 RX ADMIN — INSULIN GLARGINE 10 UNITS: 100 INJECTION, SOLUTION SUBCUTANEOUS at 21:32

## 2022-12-28 RX ADMIN — TAMSULOSIN HYDROCHLORIDE 0.4 MG: 0.4 CAPSULE ORAL at 09:08

## 2022-12-28 RX ADMIN — Medication 10 ML: at 09:08

## 2022-12-28 RX ADMIN — Medication 3 ML: at 21:00

## 2022-12-28 RX ADMIN — PANTOPRAZOLE SODIUM 40 MG: 40 TABLET, DELAYED RELEASE ORAL at 17:55

## 2022-12-28 RX ADMIN — PANTOPRAZOLE SODIUM 40 MG: 40 TABLET, DELAYED RELEASE ORAL at 09:08

## 2022-12-28 RX ADMIN — OCTREOTIDE ACETATE 25 MCG/HR: 500 INJECTION, SOLUTION INTRAVENOUS; SUBCUTANEOUS at 01:34

## 2022-12-28 RX ADMIN — ROSUVASTATIN 10 MG: 10 TABLET, FILM COATED ORAL at 20:57

## 2022-12-28 RX ADMIN — SODIUM CHLORIDE 100 ML/HR: 4.5 INJECTION, SOLUTION INTRAVENOUS at 04:53

## 2022-12-28 RX ADMIN — SODIUM CHLORIDE 100 ML/HR: 4.5 INJECTION, SOLUTION INTRAVENOUS at 16:46

## 2022-12-28 RX ADMIN — SERTRALINE 200 MG: 100 TABLET, FILM COATED ORAL at 20:57

## 2022-12-28 RX ADMIN — ALBUMIN (HUMAN) 62.5 G: 0.25 INJECTION, SOLUTION INTRAVENOUS at 17:08

## 2022-12-28 RX ADMIN — MONTELUKAST 10 MG: 10 TABLET, FILM COATED ORAL at 20:57

## 2022-12-28 NOTE — PLAN OF CARE
Problem: Adult Inpatient Plan of Care  Goal: Plan of Care Review  Recent Flowsheet Documentation  Taken 12/28/2022 1023 by Ying Ordoñez, PT  Progress: no change  Plan of Care Reviewed With: patient  Outcome Evaluation: Pt is a 66 y/o M discharged from [facility] last week after two day stay for similar complaints, who presented to [facility] w/ abdominal/R flank pain (likely 2*/2 probable stone), anemia w/ Hgb 7.4 after 2 transfusions, thrombocytopenia, leukopenia, and PERALES cirrhosis w/ rouinte ~bi-montly paracenteses (scheduled for paracenteseis 12/28/2022). Pt also w/ glucose 409 this AM and hx of partial R nephrectomy. Pt reports he is scheduled to meet w/ a surgeon on Jan. 24, 2022 regarding plans for liver transplant. Pt lives alone in two-story home w/ 1 YADI (no HR) and 12 steps to access upstairs bedroom/bathroom (B HRs). Pt typically independent w/ household/community mobility w/ no AD (does have SPC if needed), still drives, independent all ADLs, and reports 1 fall related to syncope. At [facility], pt demonstrates MOD I for bed mobility/transfers and requires supervision for gait training 50 ft x 2, pushing IV pole. Pt with increased SOA during gait training bout with reports of 77% Sp02 per monitor, however, unsure of accuracy of result, as pulse ox then read 98% Sp02 ~30 seconds later on another finger. Anticipate safe d/c home with no PT needs, plan to see for 1 additional visit to assess 02 and stair training. PPE: gloves, mask

## 2022-12-28 NOTE — PROCEDURES
Procedure:Ultrasound-guided Paracentesis    Consent: Informed    Pre-op diagnosis: Cirrhosis, Massive ascites    Post-op diagnosis: Cirrhosis, Massive ascites    Anesthesia Provider: .Pranav Cantu MD    Anesthesia type: Local infiltration with 1% Xylocaine    Surgeon: Dayanara Cantu MD    Assistant: none    Procedure summary:  Of Note;  Time out  was done at the bedside with RN and patient  Allergies , labs and medications were reviewed. Patient was made to lie supine and the area of interest was imaged with ultrasound and fluid verified and marked.  Area of interest was cleaned and draped in a sterile fashion. Subsequently local infiltration with Xylocaine. Trocar and cannula were advanced. Upon verification of the fluid, trocar was steadied and cannula advanced further. Trocar was drawn out. Cannula was connected to extension tubing and to the vacuum bottle. Subsequently the cannula was removed after completion of the procedure.     Findings: Clear straw yellow fluid was obtained. See nursing documentation for volume drained.    Lab Specimen: *40 mL sent for analysis    Complication: none    EBL: 0 mL    Post-op condition: Stable. Albumin was given by protocol if needed.    Post-op plan: Discharge back to the referring physician.

## 2022-12-28 NOTE — PLAN OF CARE
Goal Outcome Evaluation:    Hgb of 7.2 after transfusing 2U PRBCs. VSS on RA. Patient denies pain. IVF infusing. Octreotide gtt continued.

## 2022-12-28 NOTE — CONSULTS
Hematology/Oncology Inpatient Consultation    Patient name: Lei Mercado  : 1955  MRN: [id number]  Referring Provider:Bimal Hartman MD   Reason for Consultation: CYR cirrhosis with persistent anemia, thrombocytopenia, leukopenia    Chief complaint: Flank pain, blood in urine    History of present illness:    Lei Mercado is a 67 y.o. male who presented to [facility] on 2022 with complaints of right-sided abdominal pain, right flank pain and hematuria with clots.  The patient has a history of kidney stones, Cyr cirrhosis, microcytic anemia, recent paracentesis, and recent admission for severe anemia with hemoglobin of 4.  The patient relates that he has an appointment with a specialist in [place] next month to evaluate for possible liver transplantation.  The patient was admitted for further evaluation and management.    CT abdomen pelvis 2022- new right-sided hydroureteronephrosis-mild secondary to a faint 2 mm stone in the distal right ureter; liver morphology of cirrhosis, diffuse ascites and splenomegaly; new left pleural effusion    CT abdomen pelvis 2022- new right-sided perinephric fat stranding suggesting UTI such as pyelonephritis; improvement in right-sided hydronephrosis; previously demonstrated 2 mm right ureteral stone is no longer visualized; changes of cirrhosis with nodular appearance of liver and splenomegaly; moderate to large amount of ascites throughout the abdomen and pelvis; moderate left pleural effusion; cholecystolithiasis    US liver 2022- changes of cirrhosis, ascites    22  Hematology/Oncology was consulted for CYR cirrhosis, persistent anemia, thrombocytopenia, leukopenia.The patient has history of pancytopenia and labs sent .  His WBC averages 2-3, Hgb averages 7-8, and platelets average 100-110.  The patient's pancytopenia is likely related to his worsening liver function.  Patient's current level of WBC  "2.1, Hgb 7.0, platelets 100 with ANC at 1500.  His fibrinogen is elevated, recent LDH normal which rules out DIC. His Vitamin B12 is at adequate level. His iron studies reflect anemia of chronic disease with normal iron, adequate saturation and ferritin with low TIBC.  The patient has also had blood loss with urinary stone that has passed according to CT scan. The patient has also had melanotic stools and gastropathy on EGD. The patient's CT abdomen/pelvis shows enlarged spleen which also contributes to thrombocytopenia, anemia.  Oncology will assess stool for blood, SPEP for any malignant process of bone marrow, direct jonatan for hemolytic anemia process, and reticulocytes to assess bone marrow response to anemia.    He  has a past medical history of Anemia (09/2011), Anesthesia complication, Cancer (HCC), Cirrhosis (HCC), Colon polyp, Diabetes mellitus (HCC), Diverticulitis of colon, Esophageal varices (HCC), Fatty liver, Gallstones, Hyperlipidemia, Hypertension, and Liver disease.    PCP: Froylan June    History:  Past Medical History:   Diagnosis Date   • Anemia 09/2011   • Anesthesia complication     AWAKENED EARLY, ASPIRATION PNEUMONIA   • Cancer (HCC)     KIDNEY   • Cirrhosis (HCC)    • Colon polyp    • Diabetes mellitus (HCC)    • Diverticulitis of colon    • Esophageal varices (HCC)    • Fatty liver    • Gallstones    • Hyperlipidemia    • Hypertension    • Liver disease    ,   Past Surgical History:   Procedure Laterality Date   • COLONOSCOPY  approx 2018    benign polyps per pt    • ENDOSCOPY N/A 02/28/2022    Procedure: ESOPHAGOGASTRODUODENOSCOPY;  Surgeon: David Almeida MD;  Location: [facility] ENDOSCOPY;  Service: Gastroenterology;  Laterality: N/A;  pre - ruq pain, hx parikh, cirrhosis  post - hiatal hernia   • LIVER BIOPSY     • NEPHRECTOMY Right     partial    • SHOULDER SURGERY      right shoulder \"reverse\" per pt    • UPPER GASTROINTESTINAL ENDOSCOPY  approx 2018    negative per pt    ,   Family " History   Problem Relation Age of Onset   • Cirrhosis Mother    • Liver disease Sister    • Malig Hyperthermia Neg Hx    ,   Social History     Tobacco Use   • Smoking status: Former     Packs/day: 1.00     Years: 15.00     Pack years: 15.00     Types: Cigarettes     Start date: 1971     Quit date: 1984     Years since quittin.6   • Smokeless tobacco: Never   • Tobacco comments:     quit 20 plus years ago    Vaping Use   • Vaping Use: Never used   Substance Use Topics   • Alcohol use: Never   • Drug use: Not Currently     Types: Marijuana   ,   Medications Prior to Admission   Medication Sig Dispense Refill Last Dose   • esomeprazole (nexIUM) 40 MG capsule Take 40 mg by mouth Every Evening.      • metFORMIN ER (GLUCOPHAGE-XR) 500 MG 24 hr tablet Take 500 mg by mouth Every Evening.      • montelukast (SINGULAIR) 10 MG tablet Take 10 mg by mouth every night at bedtime.      • pioglitazone (ACTOS) 15 MG tablet Take 15 mg by mouth Every Evening.      • rosuvastatin (CRESTOR) 10 MG tablet Take 10 mg by mouth Every Night.      • sertraline (ZOLOFT) 100 MG tablet Take 200 mg by mouth Every Evening.      • terazosin (HYTRIN) 10 MG capsule Take 10 mg by mouth Every Night.      , Scheduled Meds:  albumin human, 37.5 g, Intravenous, Once   Or  albumin human, 50 g, Intravenous, Once   Or  albumin human, 62.5 g, Intravenous, Once   Or  albumin human, 75 g, Intravenous, Once   Or  albumin human, 87.5 g, Intravenous, Once   Or  albumin human, 100 g, Intravenous, Once   Or  albumin human, 112.5 g, Intravenous, Once  cefTRIAXone, 1 g, Intravenous, Q24H  insulin glargine, 10 Units, Subcutaneous, Nightly  insulin lispro, 2-9 Units, Subcutaneous, TID With Meals  montelukast, 10 mg, Oral, Daily  pantoprazole, 40 mg, Oral, BID AC  rosuvastatin, 10 mg, Oral, Daily  sertraline, 100 mg, Oral, Daily  sodium chloride, 10 mL, Intravenous, Q12H  sodium chloride, 3 mL, Intravenous, Q12H  tamsulosin, 0.4 mg, Oral, Daily    ,  "Continuous Infusions:  octreotide (SandoSTATIN) infusion, 25 mcg/hr, Last Rate: 25 mcg/hr (12/28/22 3191)  sodium chloride, 100 mL/hr, Last Rate: 100 mL/hr (12/28/22 5520)    , PRN Meds:  •  polyethylene glycol **AND** bisacodyl **AND** bisacodyl  •  dextrose  •  dextrose  •  glucagon (human recombinant)  •  HYDROmorphone  •  ondansetron **OR** ondansetron  •  [COMPLETED] Insert Peripheral IV **AND** sodium chloride  •  sodium chloride  •  sodium chloride  •  sodium chloride  •  sodium chloride   Allergies:  Patient has no known allergies.    Subjective     ROS:  Review of Systems   Constitutional: Negative for chills, fatigue and fever.   HENT: Negative.    Eyes: Negative.    Respiratory: Negative for shortness of breath.    Cardiovascular: Negative for chest pain and palpitations.   Gastrointestinal: Positive for abdominal distention and blood in stool (patient admits melena).   Endocrine: Negative.    Genitourinary: Positive for hematuria.   Musculoskeletal: Negative.    Skin: Negative for rash and wound.   Neurological: Negative for dizziness.   Psychiatric/Behavioral: Negative for behavioral problems.        Objective   Vital Signs:   /44 (BP Location: Right arm, Patient Position: Lying)   Pulse 70   Temp 98 °F (36.7 °C) (Oral)   Resp 17   Ht 188 cm (74\")   Wt 101 kg (223 lb)   SpO2 97%   BMI 28.63 kg/m²     Physical Exam: (performed by MD)  Physical Exam  Vitals and nursing note reviewed.   HENT:      Mouth/Throat:      Pharynx: Oropharynx is clear.   Eyes:      Pupils: Pupils are equal, round, and reactive to light.   Cardiovascular:      Rate and Rhythm: Normal rate and regular rhythm.      Pulses: Normal pulses.   Pulmonary:      Effort: Pulmonary effort is normal. No respiratory distress.   Abdominal:      General: There is distension.   Musculoskeletal:         General: Normal range of motion.      Cervical back: Normal range of motion.   Skin:     General: Skin is warm.   Neurological:      " Mental Status: He is alert and oriented to person, place, and time.   Psychiatric:         Behavior: Behavior normal.         Results Review:  Lab Results (last 48 hours)     Procedure Component Value Units Date/Time    Comprehensive Metabolic Panel [[id number]]  (Abnormal) Collected: 12/28/22 0614    Specimen: Blood Updated: 12/28/22 0654     Glucose 409 mg/dL      BUN 46 mg/dL      Creatinine 1.95 mg/dL      Sodium 130 mmol/L      Potassium 4.8 mmol/L      Chloride 101 mmol/L      CO2 22.0 mmol/L      Calcium 8.1 mg/dL      Total Protein 5.8 g/dL      Albumin 2.9 g/dL      ALT (SGPT) 6 U/L      AST (SGOT) 9 U/L      Alkaline Phosphatase 120 U/L      Total Bilirubin 2.8 mg/dL      Globulin 2.9 gm/dL      A/G Ratio 1.0 g/dL      BUN/Creatinine Ratio 23.6     Anion Gap 7.0 mmol/L      eGFR 37.0 mL/min/1.73      Comment: National Kidney Foundation and American Society of Nephrology (ASN) Task Force recommended calculation based on the Chronic Kidney Disease Epidemiology Collaboration (CKD-EPI) equation refit without adjustment for race.       Narrative:      GFR Normal >60  Chronic Kidney Disease <60  Kidney Failure <15      Ammonia [[id number]]  (Normal) Collected: 12/28/22 0614    Specimen: Blood Updated: 12/28/22 0653     Ammonia 28 umol/L     Protime-INR [[id number]]  (Abnormal) Collected: 12/28/22 0614    Specimen: Blood Updated: 12/28/22 0641     Protime 12.2 Seconds      INR 1.20    Hemoglobin & Hematocrit, Blood [[id number]]  (Abnormal) Collected: 12/28/22 0614    Specimen: Blood Updated: 12/28/22 0632     Hemoglobin 7.4 g/dL      Hematocrit 23.1 %     CBC & Differential [[id number]]  (Abnormal) Collected: 12/28/22 0058    Specimen: Blood Updated: 12/28/22 0124    Narrative:      The following orders were created for panel order CBC & Differential.  Procedure                               Abnormality         Status                     ---------                               -----------         ------                      CBC Auto Differential[[id number]]        Abnormal            Final result                 Please view results for these tests on the individual orders.    CBC Auto Differential [[id number]]  (Abnormal) Collected: 12/28/22 0058    Specimen: Blood Updated: 12/28/22 0124     WBC 2.10 10*3/mm3      RBC 2.21 10*6/mm3      Hemoglobin 7.0 g/dL      Hematocrit 21.2 %      MCV 95.8 fL      MCH 31.7 pg      MCHC 33.1 g/dL      RDW 16.8 %      RDW-SD 59.5 fl      MPV 7.6 fL      Platelets 100 10*3/mm3      Neutrophil % 71.3 %      Lymphocyte % 17.0 %      Monocyte % 8.7 %      Eosinophil % 2.0 %      Basophil % 1.0 %      Neutrophils, Absolute 1.50 10*3/mm3      Lymphocytes, Absolute 0.40 10*3/mm3      Monocytes, Absolute 0.20 10*3/mm3      Eosinophils, Absolute 0.00 10*3/mm3      Basophils, Absolute 0.00 10*3/mm3      nRBC 0.3 /100 WBC     CBC & Differential [[id number]] Collected: 12/27/22 2349    Specimen: Blood from Arm, Right Updated: 12/28/22 0119    Narrative:      The following orders were created for panel order CBC & Differential.  Procedure                               Abnormality         Status                     ---------                               -----------         ------                     CBC Auto Differential[[id number]]                            Final result               Scan Slide[[id number]]                                                                    Please view results for these tests on the individual orders.    CBC Auto Differential [[id number]] Collected: 12/27/22 2349    Specimen: Blood from Arm, Right Updated: 12/28/22 0119     WBC --     Comment: Specimen contaminated. Please see [id number]. NEHAL Aparicio notified   Modified report. Previous result was 1.90 10*3/mm3 on 12/28/2022 at 0013 EST.        RBC --     Comment: Specimen contaminated. Please see [id number]. NEHAL Aparicio notified   Modified report. Previous result was 1.97 10*6/mm3 on 12/28/2022 at 0013 EST.        Hemoglobin --      Comment: Specimen contaminated. Please see [id number]. RN Alessandra notified   Modified report. Previous result was 6.1 g/dL on 12/28/2022 at 0013 EST.        Hematocrit --     Comment: Specimen contaminated. Please see [id number]. RN Alessandra notified   Modified report. Previous result was 19.0 % on 12/28/2022 at 0013 EST.        MCV --     Comment: Specimen contaminated. Please see [id number]. RN Alessandra notified   Modified report. Previous result was 96.8 fL on 12/28/2022 at 0013 EST.        MCH --     Comment: Specimen contaminated. Please see [id number]. RN Alessandra notified   Modified report. Previous result was 31.1 pg on 12/28/2022 at 0013 EST.        MCHC --     Comment: Specimen contaminated. Please see [id number]. RN Alessandra notified   Modified report. Previous result was 32.1 g/dL on 12/28/2022 at 0013 EST.        RDW --     Comment: Specimen contaminated. Please see [id number]. RN Alessandra notified   Modified report. Previous result was 16.8 % on 12/28/2022 at 0013 EST.        RDW-SD --     Comment: Specimen contaminated. Please see [id number]. RN Alessandra notified   Modified report. Previous result was 60.4 fl on 12/28/2022 at 0013 EST.        MPV --     Comment: Specimen contaminated. Please see [id number]. RN Alessandra notified   Modified report. Previous result was 7.6 fL on 12/28/2022 at 0013 EST.        Platelets --     Comment: Specimen contaminated. Please see [id number]. RN Alessandra notified   Modified report. Previous result was 92 10*3/mm3 on 12/28/2022 at 0013 EST.       Narrative:      Modified report. Previous result was Hemogram on 12/28/2022 at 0013 EST.  The previously reported component NRBC is no longer being reported. Previous result was 0.2 /100 WBC (Reference Range: 0.0-0.2 /100 WBC) on 12/28/2022 at 0013 EST.    Hemoglobin & Hematocrit, Blood [[id number]]  (Abnormal) Collected: 12/28/22 0058    Specimen: Blood Updated: 12/28/22 0109     Hemoglobin 7.1 g/dL      Hematocrit 21.3 %     Urine Culture -  Urine, Urine, Clean Catch [[id number]]  (Normal) Collected: 12/26/22 1305    Specimen: Urine, Clean Catch Updated: 12/27/22 2227     Urine Culture No growth    POC Glucose Once [[id number]]  (Abnormal) Collected: 12/27/22 2130    Specimen: Blood Updated: 12/27/22 2132     Glucose 387 mg/dL      Comment: Serial Number: [id number]:  [id number]       Extra Tubes [[id number]] Collected: 12/27/22 1751    Specimen: Blood from Arm, Right Updated: 12/27/22 1901    Narrative:      The following orders were created for panel order Extra Tubes.  Procedure                               Abnormality         Status                     ---------                               -----------         ------                     Lavender Top[[id number]]                                     Final result                 Please view results for these tests on the individual orders.    Lavender Top [[id number]] Collected: 12/27/22 1751    Specimen: Blood from Arm, Right Updated: 12/27/22 1901     Extra Tube hold for add-on     Comment: Auto resulted       Hemoglobin & Hematocrit, Blood [[id number]]  (Abnormal) Collected: 12/27/22 1751    Specimen: Blood from Arm, Right Updated: 12/27/22 1826     Hemoglobin 7.2 g/dL      Hematocrit 22.2 %     AFP Tumor Marker [[id number]]  (Normal) Collected: 12/27/22 1150    Specimen: Blood from Arm, Left Updated: 12/27/22 1756     ALPHA-FETOPROTEIN <2 ng/mL     Narrative:      Alpha Fetoprotein Tumor Marker Reference Range:    0.0-8.3 ng/mL    Note: Normal values apply only to males and nonpregnant females. These results are not interpretable for pregnant females.    Results may be falsely decreased if patient taking Biotin.      Fibrinogen [[id number]]  (Abnormal) Collected: 12/27/22 1150    Specimen: Blood from Arm, Left Updated: 12/27/22 1716     Fibrinogen 556 mg/dL     POC Glucose Once [[id number]]  (Abnormal) Collected: 12/27/22 1641    Specimen: Blood Updated: 12/27/22 1708     Glucose 258 mg/dL       Comment: Serial Number: [id number]:  [id number]       POC Glucose Once [[id number]]  (Abnormal) Collected: 12/27/22 1329    Specimen: Blood Updated: 12/27/22 1330     Glucose 262 mg/dL      Comment: Serial Number: [id number]:  [id number]       Extra Tubes [[id number]] Collected: 12/27/22 1150    Specimen: Blood from Arm, Left Updated: 12/27/22 1301    Narrative:      The following orders were created for panel order Extra Tubes.  Procedure                               Abnormality         Status                     ---------                               -----------         ------                     Light Blue Top[[id number]]                                   Final result                 Please view results for these tests on the individual orders.    Light Blue Top [[id number]] Collected: 12/27/22 1150    Specimen: Blood from Arm, Left Updated: 12/27/22 1301     Extra Tube Hold for add-ons.     Comment: Auto resulted       POC Glucose Once [[id number]]  (Abnormal) Collected: 12/27/22 1134    Specimen: Blood Updated: 12/27/22 1227     Glucose 265 mg/dL      Comment: Serial Number: [id number]:  [id number]       Protime-INR [[id number]]  (Normal) Collected: 12/27/22 1150    Specimen: Blood from Arm, Left Updated: 12/27/22 1226     Protime 11.2 Seconds      INR 1.09    CBC (No Diff) [[id number]]  (Abnormal) Collected: 12/27/22 1150    Specimen: Blood from Arm, Left Updated: 12/27/22 1217     WBC 1.70 10*3/mm3      RBC 2.03 10*6/mm3      Hemoglobin 6.3 g/dL      Hematocrit 19.6 %      MCV 96.5 fL      MCH 31.1 pg      MCHC 32.3 g/dL      RDW 17.1 %      RDW-SD 60.8 fl      MPV 7.3 fL      Platelets 99 10*3/mm3     Hemoglobin A1c [[id number]]  (Normal) Collected: 12/26/22 1148    Specimen: Blood from Arm, Left Updated: 12/27/22 1106     Hemoglobin A1C 5.1 %     Narrative:      Hemoglobin A1C Reference Range:    <5.7 %        Normal  5.7-6.4 %     Increased risk for diabetes  > 6.4 %        Diabetes       These  guidelines have been recommended by the American Diabetic Association for Hgb A1c.      The following 2010 guidelines have been recommended by the American Diabetes Association for Hemoglobin A1c.    HBA1c 5.7-6.4% Increased risk for future diabetes (pre-diabetes)  HBA1c     >6.4% Diabetes      POC Glucose Once [[id number]]  (Abnormal) Collected: 12/27/22 0717    Specimen: Blood Updated: 12/27/22 0718     Glucose 318 mg/dL      Comment: Serial Number: [id number]:  [id number]       Manual Differential [[id number]]  (Abnormal) Collected: 12/27/22 0557    Specimen: Blood from Arm, Right Updated: 12/27/22 0709     Neutrophil % 80.0 %      Lymphocyte % 12.0 %      Monocyte % 1.0 %      Bands %  4.0 %      Metamyelocyte % 1.0 %      Atypical Lymphocyte % 2.0 %      Neutrophils Absolute 1.43 10*3/mm3      Lymphocytes Absolute 0.24 10*3/mm3      Monocytes Absolute 0.02 10*3/mm3      Anisocytosis Slight/1+     Dacrocytes Slight/1+     Poikilocytes Slight/1+     WBC Morphology Normal     Platelet Estimate Decreased    CBC & Differential [[id number]]  (Abnormal) Collected: 12/27/22 0557    Specimen: Blood from Arm, Right Updated: 12/27/22 0709    Narrative:      The following orders were created for panel order CBC & Differential.  Procedure                               Abnormality         Status                     ---------                               -----------         ------                     CBC Auto Differential[[id number]]        Abnormal            Final result               Scan Slide[[id number]]                                       Final result                 Please view results for these tests on the individual orders.    Scan Slide [[id number]] Collected: 12/27/22 0557    Specimen: Blood from Arm, Right Updated: 12/27/22 0709     Scan Slide --     Comment: See Manual Differential Results       CBC Auto Differential [[id number]]  (Abnormal) Collected: 12/27/22 0557    Specimen: Blood from Arm, Right Updated:  12/27/22 0709     WBC 1.70 10*3/mm3      RBC 1.82 10*6/mm3      Hemoglobin 5.7 g/dL      Hematocrit 18.2 %      Comment: Result checked          MCV 99.7 fL      MCH 31.2 pg      MCHC 31.3 g/dL      RDW 16.2 %      RDW-SD 57.8 fl      MPV 7.5 fL      Platelets 99 10*3/mm3     Narrative:      Modified report. Previous result was Hemogram on 12/27/2022 at 0635 EST.  The previously reported component NRBC is no longer being reported. Previous result was 0.1 /100 WBC (Reference Range: 0.0-0.2 /100 WBC) on 12/27/2022 at 0635 EST.    Extra Tubes [[id number]] Collected: 12/27/22 0557    Specimen: Blood from Arm, Right Updated: 12/27/22 0701    Narrative:      The following orders were created for panel order Extra Tubes.  Procedure                               Abnormality         Status                     ---------                               -----------         ------                     Green Top (No Gel)[[id number]]                               Final result                 Please view results for these tests on the individual orders.    Green Top (No Gel) [[id number]] Collected: 12/27/22 0557    Specimen: Blood from Arm, Right Updated: 12/27/22 0701     Extra Tube Hold for add-ons.     Comment: Auto resulted.       Basic Metabolic Panel [[id number]]  (Abnormal) Collected: 12/27/22 0425    Specimen: Blood from Arm, Right Updated: 12/27/22 0533     Glucose 386 mg/dL      BUN 51 mg/dL      Creatinine 2.03 mg/dL      Sodium 132 mmol/L      Potassium 4.5 mmol/L      Chloride 102 mmol/L      CO2 22.0 mmol/L      Calcium 8.1 mg/dL      BUN/Creatinine Ratio 25.1     Anion Gap 8.0 mmol/L      eGFR 35.3 mL/min/1.73      Comment: National Kidney Foundation and American Society of Nephrology (ASN) Task Force recommended calculation based on the Chronic Kidney Disease Epidemiology Collaboration (CKD-EPI) equation refit without adjustment for race.       Narrative:      GFR Normal >60  Chronic Kidney Disease <60  Kidney Failure  <15      POC Glucose Once [[id number]]  (Abnormal) Collected: 12/26/22 1800    Specimen: Blood Updated: 12/26/22 1801     Glucose 195 mg/dL      Comment: Serial Number: [id number]:  [id number]       Urinalysis, Microscopic Only - Urine, Clean Catch [[id number]]  (Abnormal) Collected: 12/26/22 1305    Specimen: Urine, Clean Catch Updated: 12/26/22 1328     RBC, UA Too Numerous to Count /HPF      WBC, UA 6-12 /HPF      Bacteria, UA Trace /HPF      Squamous Epithelial Cells, UA 0-2 /HPF      Hyaline Casts, UA None Seen /LPF      Methodology Automated Microscopy    Urinalysis With Microscopic If Indicated (No Culture) - Urine, Clean Catch [[id number]]  (Abnormal) Collected: 12/26/22 1305    Specimen: Urine, Clean Catch Updated: 12/26/22 1322     Color, UA Yellow     Appearance, UA Cloudy     Comment: Result checked          pH, UA 5.5     Specific Gravity, UA 1.025     Glucose,  mg/dL (1+)     Ketones, UA Negative     Bilirubin, UA Moderate (2+)     Comment: Confirmation testing is unavailable.  A serum bilirubin is recommended for further assessment.        Blood, UA Large (3+)     Protein, UA >=300 mg/dL (3+)     Leuk Esterase, UA Negative     Nitrite, UA Negative     Urobilinogen, UA 4.0 E.U./dL    Comprehensive Metabolic Panel [[id number]]  (Abnormal) Collected: 12/26/22 1148    Specimen: Blood from Arm, Left Updated: 12/26/22 1231     Glucose 441 mg/dL      BUN 51 mg/dL      Creatinine 2.16 mg/dL      Sodium 130 mmol/L      Potassium 4.1 mmol/L      Chloride 98 mmol/L      CO2 21.0 mmol/L      Calcium 8.7 mg/dL      Total Protein 6.3 g/dL      Albumin 3.30 g/dL      ALT (SGPT) 7 U/L      AST (SGOT) 10 U/L      Alkaline Phosphatase 126 U/L      Total Bilirubin 5.0 mg/dL      Globulin 3.0 gm/dL      A/G Ratio 1.1 g/dL      BUN/Creatinine Ratio 23.6     Anion Gap 11.0 mmol/L      eGFR 32.7 mL/min/1.73      Comment: National Kidney Foundation and American Society of Nephrology (ASN) Task Force recommended  calculation based on the Chronic Kidney Disease Epidemiology Collaboration (CKD-EPI) equation refit without adjustment for race.       Narrative:      GFR Normal >60  Chronic Kidney Disease <60  Kidney Failure <15      Lipase [[id number]]  (Normal) Collected: 12/26/22 1148    Specimen: Blood from Arm, Left Updated: 12/26/22 1219     Lipase 49 U/L     aPTT [[id number]]  (Abnormal) Collected: 12/26/22 1148    Specimen: Blood from Arm, Left Updated: 12/26/22 1215     PTT 34.5 seconds     Protime-INR [[id number]]  (Abnormal) Collected: 12/26/22 1148    Specimen: Blood from Arm, Left Updated: 12/26/22 1215     Protime 11.7 Seconds      INR 1.14    CBC & Differential [[id number]]  (Abnormal) Collected: 12/26/22 1148    Specimen: Blood from Arm, Left Updated: 12/26/22 1201    Narrative:      The following orders were created for panel order CBC & Differential.  Procedure                               Abnormality         Status                     ---------                               -----------         ------                     CBC Auto Differential[[id number]]        Abnormal            Final result                 Please view results for these tests on the individual orders.    CBC Auto Differential [[id number]]  (Abnormal) Collected: 12/26/22 1148    Specimen: Blood from Arm, Left Updated: 12/26/22 1201     WBC 3.70 10*3/mm3      RBC 2.41 10*6/mm3      Hemoglobin 7.5 g/dL      Hematocrit 24.0 %      MCV 99.6 fL      MCH 31.2 pg      MCHC 31.3 g/dL      RDW 16.4 %      RDW-SD 57.8 fl      MPV 7.5 fL      Platelets 120 10*3/mm3      Neutrophil % 84.4 %      Lymphocyte % 8.2 %      Monocyte % 6.1 %      Eosinophil % 0.7 %      Basophil % 0.6 %      Neutrophils, Absolute 3.10 10*3/mm3      Lymphocytes, Absolute 0.30 10*3/mm3      Monocytes, Absolute 0.20 10*3/mm3      Eosinophils, Absolute 0.00 10*3/mm3      Basophils, Absolute 0.00 10*3/mm3      nRBC 0.0 /100 WBC            Pending Results:     Imaging Reviewed:   CT Abdomen  Pelvis Without Contrast    Result Date: 12/26/2022   1.  New right-sided perinephric fat stranding suggesting urinary tract infection such as pyelonephritis.  There is been interval improvement in right-sided hydronephrosis.  Previously demonstrated 2 mm right ureteral stone is no longer visualized. 2.  Changes of cirrhosis with nodular appearance of the liver and splenomegaly. 3.  Moderate to large amount of ascites throughout the abdomen and pelvis similar prior exam. 4.  No change in moderate left pleural effusion. 5.  Cholelithiasis.  Electronically Signed By-Yong Harrison MD On:12/26/2022 5:17 PM This report was finalized on [id number] by  Yong Harrison MD.    CT Abdomen Pelvis Without Contrast    Result Date: 12/22/2022  New right-sided hydroureteronephrosis, mild and felt to be secondary to a faint 2 mm stone in the distal right ureter. Liver morphology of cirrhosis, diffuse ascites and splenomegaly are stable findings. There is a new left pleural effusion.    Electronically Signed By-Gage Jean Baptiste MD On:12/22/2022 7:50 PM This report was finalized on [id number] by  Gage Jean Baptiste MD.    US Liver    Result Date: 12/27/2022   1. Changes of cirrhosis and ascites.  Electronically Signed By-Larry Bowen MD On:12/27/2022 1:47 PM This report was finalized on [id number] by  Larry Bowen MD.           Assessment & Plan   ASSESSMENT  This is a 67-year-old male with Hx PERALES cirrhosis and recent worsening anemia requiring transfusions who also has mild thrombocytopenia and leukopenia.  Patient is noted to have hematuria with stone seen on initial CT and gone on follow-up CT, and dark and tarry stools with EGD showing gastropathy.  Oncology was consulted for PERALES cirrhosis with worsening anemia, thrombocytopenia, and leukopenia.  Oncology recommends transfusion for Hgb<7.0 with monitoring, CBC daily while obtaining labs to rule out hemolytic anemia, malignancy blood in stool, and bone marrow response as well  as nutritional deficiency.    Anemia: Hgb 7.0, normocytic, normochromic  Requiring multiple PRBC transfusions to maintain Hgb level  Related to cirrhosis, urinary blood loss with renal stone, dark/tarry stools  EGD 12/27/2022- portal hypertensive gastropathy, patchy erythema of entire stomach with friable mucosa    Thrombocytopenia: Platelets 100, mild  Related to cirrhosis    Leukopenia: WBC 2.1, ANC 1500  Related to cirrhosis    Cirrhosis  CT 12/22/2022- cirrhotic morphology of liver, diffuse ascites  CT 12/26/2022- liver is small and nodular in configuration compatible with cirrhosis, moderate amount of abdominal ascites  US liver 12/27/2022- changes of cirrhosis and ascites    Other chronic conditions: Diabetes mellitus, depression, hyperlipidemia, GERD    PLAN  1. Direct Sam, reticulocyte, folate, SPEP, occult blood fecal assay  2. Monitor CBC daily  3. Transfuse 1 unit PRBCs for Hgb<7.0    Electronically signed by DONALD Patel, 12/28/22, 7:30 AM EST.    I was asked to see Mr. Mercado, who was admitted with severe anemia.  He had previously been diagnosed with urolithiasis and had been experiencing pain and hematuria.  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  He was being considered for transplantation at the time of this consultation.  On the day prior to the consultation he was seen by gastroenterology and underwent an upper gastrointestinal endoscopy.  This reported portal hypertension gastropathy and a friable mucosa that bled easily upon contact.  Blood was not identified within the stomach or the first part of the duodenum however bleeding was identified at the time of the procedure.  Mr. Mercado carried a history of anemia that dated back a few years before this admission but it never had been a severe as it was at the time of the admission.  He reported being very fatigued and having to sleep for long periods of time.  He tended to feel  better after the transfusions but his fatigue would recur rapidly.  He gave a history of an early stage right renal cell carcinoma for which he underwent a nephron preserving surgery.  He also had a history of diabetes mellitus and cholelithiasis.  He was receiving treatment for hyperlipidemia and hypertension.  He had undergone several surgical procedures and an upper gastrointestinal endoscopy sometime around 2018.  Both his parents were .  His mother, apparently,  of complications of nonalcoholic steatohepatitis.  On exam he was alert, in good spirits, oriented and conversant.  Not pale or jaundiced and seemed well-hydrated.  Respirations nonlabored and lungs clear.  Heart regular.  The abdomen was rounded and soft.  Liver and spleen did not seem to be enlarged and bowel sounds were present.  The extremities showed no edema.  Laboratory exams indeed reported anemia.  There was no suggestion of iron deficiency and there was a brisk reticular Citic response.  Given the history, the endoscopic findings and the laboratory exams the impression was that his anemia was secondary to gastrointestinal bleeding.  No clear evidence of a hematologic disorder was identified.  Fecal occult blood was requested.  A long discussion was had with him in regards to his laboratory exams and my impression.  I discussed with Ms. Kurt BENNETT and concur with her note.    Evangelista Jones MD on 2022 at 1638

## 2022-12-28 NOTE — ANESTHESIA POSTPROCEDURE EVALUATION
Patient: Lei Mercado    Procedure Summary     Date: 12/27/22 Room / Location: [facility] ENDOSCOPY 1 / [facility] ENDOSCOPY    Anesthesia Start: 1221 Anesthesia Stop: 1240    Procedure: ESOPHAGOGASTRODUODENOSCOPY Diagnosis:       Anemia, unspecified type      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)      (Anemia, unspecified type [D64.9])      (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) [K75.81, K74.60])    Surgeons: TANVIR Calixto MD Provider: Peyman England MD    Anesthesia Type: MAC ASA Status: 4          Anesthesia Type: MAC    Vitals  Vitals Value Taken Time   /51 12/27/22 1300   Temp     Pulse 71 12/27/22 1300   Resp 18 12/27/22 1300   SpO2 98 % 12/27/22 1300           Post Anesthesia Care and Evaluation    Patient location during evaluation: PACU  Patient participation: complete - patient participated  Level of consciousness: awake  Pain scale: See nurse's notes for pain score.  Pain management: adequate    Airway patency: patent  Anesthetic complications: No anesthetic complications  PONV Status: none  Cardiovascular status: acceptable  Respiratory status: acceptable  Hydration status: acceptable    Comments: Patient seen and examined postoperatively; vital signs stable; SpO2 greater than or equal to 90%; cardiopulmonary status stable; nausea/vomiting adequately controlled; pain adequately controlled; no apparent anesthesia complications; patient discharged from anesthesia care when discharge criteria were met

## 2022-12-28 NOTE — PROGRESS NOTES
LOS: 0 days   Patient Care Team:  Froylan June as PCP - General (Preventative Medicine)      Subjective     Interval History:     Subjective: Patient with no new complaints.  Denies any bright red blood per rectum or melena.  No nausea/vomiting.  Continues to have some hematuria.  Denies abdominal pain      ROS:   No chest pain, shortness of breath, or cough.        Medication Review:     Current Facility-Administered Medications:   •  albumin human 25 % IV SOLN 37.5 g, 37.5 g, Intravenous, Once **OR** albumin human 25 % IV SOLN 50 g, 50 g, Intravenous, Once **OR** albumin human 25 % IV SOLN 62.5 g, 62.5 g, Intravenous, Once **OR** albumin human 25 % IV SOLN 75 g, 75 g, Intravenous, Once **OR** albumin human 25 % IV SOLN 87.5 g, 87.5 g, Intravenous, Once **OR** albumin human 25 % IV SOLN 100 g, 100 g, Intravenous, Once **OR** albumin human 25 % IV SOLN 112.5 g, 112.5 g, Intravenous, Once, Michelle Ro, APRN  •  polyethylene glycol (MIRALAX) packet 17 g, 17 g, Oral, Daily PRN **AND** bisacodyl (DULCOLAX) EC tablet 5 mg, 5 mg, Oral, Daily PRN **AND** bisacodyl (DULCOLAX) suppository 10 mg, 10 mg, Rectal, Daily PRN, Edie Cole, APRN  •  cefTRIAXone (ROCEPHIN) 1 g in sodium chloride 0.9 % 100 mL IVPB, 1 g, Intravenous, Q24H, Gilles Green PA-C, Last Rate: 200 mL/hr at 12/28/22 0915, 1 g at 12/28/22 0915  •  dextrose (D50W) (25 g/50 mL) IV injection 25 g, 25 g, Intravenous, Q15 Min PRN, Edie Cole, APRN  •  dextrose (GLUTOSE) oral gel 15 g, 15 g, Oral, Q15 Min PRN, Edie Cole, DONALD  •  ferric gluconate (FERRLECIT) 250 MG in sodium chloride 0.9% 250 mL IVPB, 250 mg, Intravenous, Once, Michelle Ro, APRN  •  glucagon (human recombinant) (GLUCAGEN DIAGNOSTIC) 1 mg in sterile water (preservative free) 1 mL injection, 1 mg, Intramuscular, Q15 Min PRN, Edie Cole APRN  •  HYDROmorphone (DILAUDID) injection 0.25 mg, 0.25 mg, Intravenous, Q4H PRN, Juan Davis MD, 0.25 mg at 12/27/22 0546  •  insulin  glargine (LANTUS, SEMGLEE) injection 10 Units, 10 Units, Subcutaneous, Nightly, Gilles Green PA-C, 10 Units at 12/27/22 2120  •  insulin lispro (ADMELOG) injection 2-9 Units, 2-9 Units, Subcutaneous, TID With Meals, Edie Cole APRN, 8 Units at 12/28/22 0927  •  montelukast (SINGULAIR) tablet 10 mg, 10 mg, Oral, Daily, Edie Cole APRN, 10 mg at 12/27/22 2120  •  [COMPLETED] octreotide (SANDOSTATIN) bolus from bag 5 mcg/mL 50 mcg, 50 mcg, Intravenous, Once, 50 mcg at 12/27/22 1125 **FOLLOWED BY** octreotide (sandoSTATIN) 500 mcg in sodium chloride 0.9 % 100 mL (5 mcg/mL) infusion, 25 mcg/hr, Intravenous, Continuous, Gilles Green PA-C, Last Rate: 5 mL/hr at 12/28/22 0752, 25 mcg/hr at 12/28/22 0752  •  ondansetron (ZOFRAN) tablet 4 mg, 4 mg, Oral, Q6H PRN **OR** ondansetron (ZOFRAN) injection 4 mg, 4 mg, Intravenous, Q6H PRN, Edie Cole APRN, 4 mg at 12/27/22 0221  •  pantoprazole (PROTONIX) EC tablet 40 mg, 40 mg, Oral, BID LESLIE, Michelle Ro APRN, 40 mg at 12/28/22 0908  •  rosuvastatin (CRESTOR) tablet 10 mg, 10 mg, Oral, Daily, Edie Cole APRN, 10 mg at 12/27/22 2120  •  sertraline (ZOLOFT) tablet 200 mg, 200 mg, Oral, Daily, Bimal Hartman MD  •  sodium chloride 0.45 % infusion, 100 mL/hr, Intravenous, Continuous, Danilo Decker MD, Last Rate: 100 mL/hr at 12/28/22 0620, 100 mL/hr at 12/28/22 0620  •  [COMPLETED] Insert Peripheral IV, , , Once **AND** sodium chloride 0.9 % flush 10 mL, 10 mL, Intravenous, PRN, Shaylee Gonzales, APRN  •  sodium chloride 0.9 % flush 10 mL, 10 mL, Intravenous, Q12H, Edie Cole APRN, 10 mL at 12/28/22 0908  •  sodium chloride 0.9 % flush 10 mL, 10 mL, Intravenous, PRN, Edie Cole APRN, 10 mL at 12/27/22 0835  •  sodium chloride 0.9 % flush 3 mL, 3 mL, Intravenous, Q12H, Michelle Ro, APRN  •  sodium chloride 0.9 % flush 3-10 mL, 3-10 mL, Intravenous, PRN, Michelle Ro, APRN  •  sodium chloride 0.9 % infusion 40 mL, 40 mL,  Intravenous, PRN, Edie Cole APRN  •  sodium chloride 0.9 % infusion 40 mL, 40 mL, Intravenous, PRN, Michelle Ro APRN, 40 mL at 12/27/22 1222  •  tamsulosin (FLOMAX) 24 hr capsule 0.4 mg, 0.4 mg, Oral, Daily, Danilo Decker MD, 0.4 mg at 12/28/22 0908      Objective     Vital Signs  Vitals:    12/27/22 2130 12/27/22 2200 12/27/22 2219 12/28/22 0541   BP:   115/66 105/44   BP Location:   Right arm Right arm   Patient Position:   Lying Lying   Pulse: 84 81 83 70   Resp:   17 17   Temp:   97 °F (36.1 °C) 98 °F (36.7 °C)   TempSrc:   Oral Oral   SpO2:   95% 97%   Weight:       Height:           Physical Exam:     General Appearance:    Awake and alert, in no acute distress   Head:    Normocephalic, without obvious abnormality   Eyes:          Conjunctivae normal, anicteric sclera   Throat:   No oral lesions, no thrush, oral mucosa moist   Neck:   No adenopathy, supple, no JVD   Lungs:     respirations regular, even and unlabored   Abdomen:     Soft, non-tender, no rebound or guarding, moderate distention   Rectal:     Deferred   Extremities:   No edema, no cyanosis   Skin:   No bruising or rash, no jaundice        Results Review:    CBC    Results from last 7 days   Lab Units 12/28/22  0614 12/28/22  0058 12/27/22  1751 12/27/22  1150 12/27/22  0557 12/26/22  1148 12/23/22  0723 12/23/22  0001 12/22/22  1630   WBC 10*3/mm3  --  2.10*  --  1.70* 1.70* 3.70 2.20* 2.10* 1.60*   HEMOGLOBIN g/dL 7.4* 7.0*  7.1* 7.2* 6.3* 5.7* 7.5* 8.4* 6.8* 4.7*   PLATELETS 10*3/mm3  --  100*  --  99* 99* 120* 106* 101* 92*     CMP   Results from last 7 days   Lab Units 12/28/22  0614 12/27/22  0425 12/26/22  1148 12/23/22  0723 12/22/22  1630   SODIUM mmol/L 130* 132* 130* 133* 133*   POTASSIUM mmol/L 4.8 4.5 4.1 4.3 4.0   CHLORIDE mmol/L 101 102 98 103 100   CO2 mmol/L 22.0 22.0 21.0* 20.0* 22.0   BUN mg/dL 46* 51* 51* 33* 33*   CREATININE mg/dL 1.95* 2.03* 2.16* 2.16* 2.20*   GLUCOSE mg/dL 409* 386* 441* 243* 302*   ALBUMIN  g/dL 2.9*  --  3.30*  --  4.00   BILIRUBIN mg/dL 2.8*  --  5.0*  --  2.8*   ALK PHOS U/L 120*  --  126*  --  100   AST (SGOT) U/L 9  --  10  --  7   ALT (SGPT) U/L 6  --  7  --  8   LIPASE U/L  --   --  49  --   --    AMMONIA umol/L 28  --   --   --   --      Cr Clearance Estimated Creatinine Clearance: 46.6 mL/min (A) (by C-G formula based on SCr of 1.95 mg/dL (H)).  Coag   Results from last 7 days   Lab Units 12/28/22  0614 12/27/22  1150 12/26/22  1148 12/22/22  1630 12/22/22  1303   INR  1.20* 1.09 1.14* 1.18* 1.18*   APTT seconds  --   --  34.5* 27.5*  --      HbA1C   Lab Results   Component Value Date    HGBA1C 5.0 12/28/2022    HGBA1C 5.1 12/26/2022    HGBA1C 7.9 (H) 05/09/2022     Blood Glucose   Glucose   Date/Time Value Ref Range Status   12/28/2022 0713 394 (H) 70 - 105 mg/dL Final     Comment:     Serial Number: [id number]:  [id number]   12/27/2022 2130 387 (H) 70 - 105 mg/dL Final     Comment:     Serial Number: [id number]:  [id number]   12/27/2022 1641 258 (H) 70 - 105 mg/dL Final     Comment:     Serial Number: [id number]:  [id number]   12/27/2022 1329 262 (H) 70 - 105 mg/dL Final     Comment:     Serial Number: [id number]:  [id number]   12/27/2022 1134 265 (H) 70 - 105 mg/dL Final     Comment:     Serial Number: [id number]:  [id number]   12/27/2022 0717 318 (H) 70 - 105 mg/dL Final     Comment:     Serial Number: [id number]:  [id number]   12/26/2022 1800 195 (H) 70 - 105 mg/dL Final     Comment:     Serial Number: [id number]:  [id number]     Infection   Results from last 7 days   Lab Units 12/26/22  1305   URINECX  No growth     UA    Results from last 7 days   Lab Units 12/26/22  1305   NITRITE UA  Negative   WBC UA /HPF 6-12*   BACTERIA UA /HPF Trace*   SQUAM EPITHEL UA /HPF 0-2   URINECX  No growth     Radiology(recent) CT Abdomen Pelvis Without Contrast    Result Date: 12/26/2022   1.  New right-sided perinephric fat stranding suggesting urinary tract  infection such as pyelonephritis.  There is been interval improvement in right-sided hydronephrosis.  Previously demonstrated 2 mm right ureteral stone is no longer visualized. 2.  Changes of cirrhosis with nodular appearance of the liver and splenomegaly. 3.  Moderate to large amount of ascites throughout the abdomen and pelvis similar prior exam. 4.  No change in moderate left pleural effusion. 5.  Cholelithiasis.  Electronically Signed By-Yong Harrison MD On:12/26/2022 5:17 PM This report was finalized on [id number] by  Yong Harrison MD.    US Liver    Result Date: 12/27/2022   1. Changes of cirrhosis and ascites.  Electronically Signed By-Larry Bowen MD On:12/27/2022 1:47 PM This report was finalized on [id number] by  Larry Bowen MD.         Assessment & Plan     ASSESSMENT:  -Severe macrocytic anemia  -Hematuria  -Nausea  -LUQ pain  -PERALES cirrhosis complicated by recurrent ascites  -Elevated LFTs -suspect due to above  -Leukopenia  -MARY  -Abnormal CT suggestive of pyelonephritis  -History of renal cancer s/p right partial nephrectomy (9/2021)  -Diabetes  -Hypertension  -Hyperlipidemia      PLAN:  Patient is a 67-year-old male with history of Perales cirrhosis complicated by recurrent ascites, renal cancer s/p right partial nephrectomy, and diabetes who presented on 12/26 with complaints of weakness, anemia, and hematuria.    Patient reports that he is feeling okay today.  Denies any abdominal pain.  No overt GI bleeding.  No vomiting.  Hemoglobin 7.4 today from 7.0 yesterday.  Continue to monitor H/H and transfuse as needed.  S/p EGD yesterday showing portal hypertensive gastropathy, patchy erythema of the entire stomach with mucosa that was very friable, stomach body/fundus polyps, and hiatal hernia.    Continue PPI twice daily.  Plan IV iron x1 today.  We will plan outpatient colonoscopy for further evaluation of anemia.  Our office will contact the patient to arrange this following  discharge.  Plan for paracentesis today with fluid studies.  Replace albumin as needed.  Total bilirubin 2.8 today from 5.0 yesterday.  Alk phos 120 from 126.  RUQ US showing cirrhosis and ascites.  Urology is following for hematuria and UTI/pyelonephritis.  Patient has been referred to [facility] for transplant evaluation.  He has a pending appointment on 1/24/2023.  Okay for discharge home from GI standpoint today following paracentesis and IV iron infusion.  Keep telehealth appointment as scheduled with Dr. Park on 1/5/2023 at 11:15 AM.      Electronically signed by DONALD English, 12/28/22, 10:31 AM EST.

## 2022-12-28 NOTE — THERAPY EVALUATION
"Patient Name: Lei Mercado  : 1955    MRN: [id number]                              Today's Date: 2022       Admit Date: 2022    Visit Dx:     ICD-10-CM ICD-9-CM   1. Hematuria, unspecified type  R31.9 599.70   2. Anemia, unspecified type  D64.9 285.9   3. Right flank pain  R10.9 789.09   4. 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)   • Hematuria   • Anemia   • Hematuria, unspecified type     Past Medical History:   Diagnosis Date   • Anemia 2011   • Anesthesia complication     AWAKENED EARLY, ASPIRATION PNEUMONIA   • Cancer (HCC)     KIDNEY   • Cirrhosis (HCC)    • Colon polyp    • Diabetes mellitus (HCC)    • Diverticulitis of colon    • Esophageal varices (HCC)    • Fatty liver    • Gallstones    • Hyperlipidemia    • Hypertension    • Liver disease      Past Surgical History:   Procedure Laterality Date   • COLONOSCOPY  approx 2018    benign polyps per pt    • ENDOSCOPY N/A 2022    Procedure: ESOPHAGOGASTRODUODENOSCOPY;  Surgeon: David Almeida MD;  Location: [facility] ENDOSCOPY;  Service: Gastroenterology;  Laterality: N/A;  pre - ruq pain, hx parikh, cirrhosis  post - hiatal hernia   • ENDOSCOPY N/A 2022    Procedure: ESOPHAGOGASTRODUODENOSCOPY;  Surgeon: TANVIR Calixto MD;  Location: [facility] ENDOSCOPY;  Service: Gastroenterology;  Laterality: N/A;  post: portal hypertensive gastropathy, gastric polyps   • LIVER BIOPSY     • NEPHRECTOMY Right     partial    • SHOULDER SURGERY      right shoulder \"reverse\" per pt    • UPPER GASTROINTESTINAL ENDOSCOPY  approx     negative per pt       General Information     Row Name 22 1023          Physical Therapy Time and Intention    Document Type evaluation  -AO     Mode of Treatment physical therapy  -AO     Row Name " 12/28/22 1023          General Information    Patient Profile Reviewed yes  -AO     Prior Level of Function independent:;all household mobility;community mobility;gait;dressing;bathing;driving;using stairs  -AO     Existing Precautions/Restrictions fall  -AO     Barriers to Rehab none identified  -AO     Row Name 12/28/22 1023          Living Environment    People in Home alone  -AO     Row Name 12/28/22 1023          Home Main Entrance    Number of Stairs, Main Entrance one  -AO     Row Name 12/28/22 1023          Stairs Within Home, Primary    Stairs, Within Home, Primary Upstairs bedroom/bathroom  -AO     Number of Stairs, Within Home, Primary twelve  -AO     Stair Railings, Within Home, Primary railings safe and in good condition  -AO     Row Name 12/28/22 1023          Cognition    Orientation Status (Cognition) oriented x 4  -AO     Row Name 12/28/22 1023          Safety Issues, Functional Mobility    Impairments Affecting Function (Mobility) balance;endurance/activity tolerance;shortness of breath  -AO           User Key  (r) = Recorded By, (t) = Taken By, (c) = Cosigned By    Initials Name Provider Type    AO Ying Ordoñez, PT Physical Therapist               Mobility     Row Name 12/28/22 1023          Bed Mobility    Bed Mobility supine-sit  -AO     Supine-Sit [place] (Bed Mobility) modified independence  -AO     Assistive Device (Bed Mobility) bed rails;head of bed elevated  -AO     Row Name 12/28/22 1023          Sit-Stand Transfer    Sit-Stand [place] (Transfers) modified independence  -AO     Row Name 12/28/22 1023          Gait/Stairs (Locomotion)    [place] Level (Gait) supervision  -AO     Assistive Device (Gait) --  pushes IV pole  -AO     Distance in Feet (Gait) 50 ft x 2  -AO           User Key  (r) = Recorded By, (t) = Taken By, (c) = Cosigned By    Initials Name Provider Type    Ying Robertson, PT Physical Therapist               Obj/Interventions     Row Name 12/28/22  1023          Range of Motion Comprehensive    General Range of Motion no range of motion deficits identified  -AO     Row Name 12/28/22 1023          Strength Comprehensive (MMT)    General Manual Muscle Testing (MMT) Assessment no strength deficits identified  -AO     [facility] Name 12/28/22 1023          Sensory Assessment (Somatosensory)    Sensory Assessment (Somatosensory) sensation intact  -AO           User Key  (r) = Recorded By, (t) = Taken By, (c) = Cosigned By    Initials Name Provider Type    AO Ying Ordoñez, PT Physical Therapist               Goals/Plan     Row Name 12/28/22 1023          Gait Training Goal 1 (PT)    Activity/Assistive Device (Gait Training Goal 1, PT) gait (walking locomotion)  -AO     [place] Level (Gait Training Goal 1, PT) modified independence  -AO     Distance (Gait Training Goal 1, PT) 150 ft with Sp02 >90%  -AO     Time Frame (Gait Training Goal 1, PT) long term goal (LTG);2 weeks  -AO     Progress/Outcome (Gait Training Goal 1, PT) goal not met  -AO     Row Name 12/28/22 1023          Stairs Goal 1 (PT)    Activity/Assistive Device (Stairs Goal 1, PT) stairs, all skills  -AO     [place] Level/Cues Needed (Stairs Goal 1, PT) modified independence  -AO     Number of Stairs (Stairs Goal 1, PT) 12 steps  -AO     Time Frame (Stairs Goal 1, PT) long term goal (LTG);2 weeks  -AO     Progress/Outcome (Stairs Goal 1, PT) goal not met  -AO     [facility] Name 12/28/22 1023          Therapy Assessment/Plan (PT)    Planned Therapy Interventions (PT) balance training;bed mobility training;gait training;home exercise program;neuromuscular re-education;patient/family education;stair training;transfer training  -AO           User Key  (r) = Recorded By, (t) = Taken By, (c) = Cosigned By    Initials Name Provider Type    AO Ying Ordoñez, PT Physical Therapist               Clinical Impression     Row Name 12/28/22 1023          Pain    Pretreatment Pain Rating 3/10  -AO     Posttreatment  Pain Rating 3/10  -AO     Pain Location - abdomen  -AO     Row Name 12/28/22 1023          Plan of Care Review    Plan of Care Reviewed With patient  -AO     Progress no change  -AO     Outcome Evaluation Pt is a 68 y/o M discharged from [facility] last week after two day stay for similar complaints, who presented to [facility] w/ abdominal/R flank pain (likely 2*/2 probable stone), anemia w/ Hgb 7.4 after 2 transfusions, thrombocytopenia, leukopenia, and PERALES cirrhosis w/ rouinte ~bi-montly paracenteses (scheduled for paracenteseis 12/28/2022). Pt also w/ glucose 409 this AM and hx of partial R nephrectomy. Pt reports he is scheduled to meet w/ a surgeon on Jan. 24, 2022 regarding plans for liver transplant. Pt lives alone in two-story home w/ 1 YADI (no HR) and 12 steps to access upstairs bedroom/bathroom (B HRs). Pt typically independent w/ household/community mobility w/ no AD (does have SPC if needed), still drives, independent all ADLs, and reports 1 fall related to syncope. At [facility], pt demonstrates MOD I for bed mobility/transfers and requires supervision for gait training 50 ft x 2, pushing IV pole. Pt with increased SOA during gait training bout with reports of 77% Sp02 per monitor, however, unsure of accuracy of result, as pulse ox then read 98% Sp02 ~30 seconds later on another finger. Anticipate safe d/c home with no PT needs, plan to see for 1 additional visit to assess 02 and stair training. PPE: gloves, mask  -AO     Row Name 12/28/22 1023          Therapy Assessment/Plan (PT)    Rehab Potential (PT) good, to achieve stated therapy goals  -AO     Criteria for Skilled Interventions Met (PT) yes;meets criteria;skilled treatment is necessary  -AO     Therapy Frequency (PT) 2 times/wk  -AO     Predicted Duration of Therapy Intervention (PT) until d/c  -AO     Row Name 12/28/22 1023          Positioning and Restraints    Pre-Treatment Position in bed  -AO     Post Treatment Position bed  -AO     In Bed supine;call light  within reach;encouraged to call for assist  -AO           User Key  (r) = Recorded By, (t) = Taken By, (c) = Cosigned By    Initials Name Provider Type    AO Ying Ordoñez, PT Physical Therapist               Outcome Measures    No documentation.                              Physical Therapy Education     Title: PT OT SLP Therapies (Done)     Topic: Physical Therapy (Done)     Point: Mobility training (Done)     Learning Progress Summary           Patient Acceptance, E,TB, VU by AO at 12/28/2022 1054                               User Key     Initials Effective Dates Name Provider Type Discipline    AO 06/16/21 -  Ying Ordoñez, PT Physical Therapist PT              PT Recommendation and Plan  Planned Therapy Interventions (PT): balance training, bed mobility training, gait training, home exercise program, neuromuscular re-education, patient/family education, stair training, transfer training  Plan of Care Reviewed With: patient  Progress: no change  Outcome Evaluation: Pt is a 66 y/o M discharged from [facility] last week after two day stay for similar complaints, who presented to [facility] w/ abdominal/R flank pain (likely 2*/2 probable stone), anemia w/ Hgb 7.4 after 2 transfusions, thrombocytopenia, leukopenia, and PERALES cirrhosis w/ rouinte ~bi-montly paracenteses (scheduled for paracenteseis 12/28/2022). Pt also w/ glucose 409 this AM and hx of partial R nephrectomy. Pt reports he is scheduled to meet w/ a surgeon on Jan. 24, 2022 regarding plans for liver transplant. Pt lives alone in two-story home w/ 1 YADI (no HR) and 12 steps to access upstairs bedroom/bathroom (B HRs). Pt typically independent w/ household/community mobility w/ no AD (does have SPC if needed), still drives, independent all ADLs, and reports 1 fall related to syncope. At [facility], pt demonstrates MOD I for bed mobility/transfers and requires supervision for gait training 50 ft x 2, pushing IV pole. Pt with increased SOA during gait training bout with  reports of 77% Sp02 per monitor, however, unsure of accuracy of result, as pulse ox then read 98% Sp02 ~30 seconds later on another finger. Anticipate safe d/c home with no PT needs, plan to see for 1 additional visit to assess 02 and stair training. PPE: gloves, mask     Time Calculation:    PT Charges     Row Name 12/28/22 1054             Time Calculation    Start Time 1023  -AO      Stop Time 1054  -AO      Time Calculation (min) 31 min  -AO      PT Received On 12/28/22  -AO      PT - Next Appointment 12/30/22  -AO      PT Goal Re-Cert Due Date 01/11/23  -AO         Time Calculation- PT    Total Timed Code Minutes- PT 0 minute(s)  -AO            User Key  (r) = Recorded By, (t) = Taken By, (c) = Cosigned By    Initials Name Provider Type    AO Ying Ordoñez, PT Physical Therapist              Therapy Charges for Today     Code Description Service Date Service Provider Modifiers Qty    31257505309 HC PT EVAL LOW COMPLEXITY 4 12/28/2022 Ying Ordoñez, PT GP 1          PT G-Codes  AM-PAC 6 Clicks Score (PT): 18  PT Discharge Summary  Anticipated Discharge Disposition (PT): home    Ying Ordoñez PT  12/28/2022

## 2022-12-28 NOTE — NURSING NOTE
Spoke with Dr San regarding hemoglobin level of 7.1 following earlier transfusions. No orders to transfuse at this time. Will recheck H&H in the AM.

## 2022-12-28 NOTE — PLAN OF CARE
Goal Outcome Evaluation:  Plan of Care Reviewed With: patient           Outcome Evaluation: Patient had a paracentesis today which removed 8.9L of fluid. Pt hemoglobin dropped to 6.5 this afternoon. Pt receiving 1unit PRBC's. continue to monitor

## 2022-12-28 NOTE — CONSULTS
"Diabetes Education  Assessment/Teaching    Patient Name:  Lei Mercado  YOB: 1955  MRN: [id number]  Admit Date:  12/26/2022      Assessment Date:  12/28/2022  Flowsheet Row Most Recent Value   General Information     Referral From: Blood glucose  [Admission blood sugar 441.]   Height 188 cm (74\")   Height Method Stated   Weight 101 kg (223 lb)   Weight Method Standing scale   Pregnancy Assessment    Diabetes History    What type of diabetes do you have? Type 2   Current DM knowledge fair   Have you had diabetes education/teaching in the past? yes   When and where was your diabetes education? Inpatient hospitalization at [facility] on 12/23/2022   Do you test your blood sugar at home? yes   Frequency of checks as often as needs   Meter type Freestyle Shay   Who performs the test? patient   Typical readings 300s this past week   Have you had low blood sugar? (<70mg/dl) no   Have you had high blood sugar? (>140mg/dl) yes   How often do you have high blood sugar? frequently   When was your last high blood sugar? Admission blood sugar 441.   Education Preferences    What areas of diabetes would you like to learn about? avoiding high blood sugar, diabetes complications, medications for diabetes   Nutrition Information    Assessment Topics    Problem Solving - Assessment Needs education   Reducing Risk - Assessment Needs education   DM Goals    Problem Solving - Goal Today   Reducing Risk - Goal Today          Flowsheet Row Most Recent Value   DM Education Needs    Meter Has own   Meter Type Freestyle   Medication Oral  [At home patient stated he takes Metformin  mg every evening and Actos 15 mg every evening.]   Problem Solving Hyperglycemia, Signs, Symptoms, Treatment   Reducing Risks A1C testing  [On 12/28/2022 A1c was 5.0%. Discussed with patient that this result does not match with his high blood sugar readings and that his health conditions are affecting his A1c result.]   Discharge Plan Home "   Motivation Moderate   Teaching Method Discussion, Handouts   Patient Response Verbalized understanding            Other Comments:  Patient stated he cannot take more than 1 Metformin a day due to severe diarrhea when dose increased. Patient stated he does not want to be on insulin. Patient stated he needs to have a good A1c result to be able to get a transplant. Explained to patient that his admission blood sugar was 441 and even if he has a good A1c result they will not want to do the transplant with high blood sugar results. Patient stated he will discuss with his PCP. Patient has no further questions or concerns related to diabetes at this time.        Electronically signed by:  Dianne Lutz RN  12/28/22 14:49 EST

## 2022-12-28 NOTE — PROGRESS NOTES
[facility] Medicine Services Daily Progress Note    Patient Name: Lei Mercado  : 1955  MRN: [id number]  Primary Care Physician:  Froylan June  Date of admission: 2022      Subjective      Chief Complaint: Blood in urine    Patient reports no further blood in urine.  Patient overall feeling much better.  Hematology evaluated patient ordered a few more tests.  Patient getting paracentesis today.  Hemoglobin initially stabilizing and then decreased to 6.5 showing continue drift and transfusing another unit.      Review of Systems   Gastrointestinal: Positive for bloating.   All other systems reviewed and are negative.        Objective      Vitals:   Temp:  [97 °F (36.1 °C)-98.3 °F (36.8 °C)] 97.8 °F (36.6 °C)  Heart Rate:  [] 92  Resp:  [16-18] 16  BP: (100-130)/(44-79) 110/74    Physical Exam     General: Elderly male lying in bed breathing comfortably on room air no acute distress  HEENT: NC/AT, EOMI, mucosa moist  Heart: Regular, rate controlled  Chest: Normal work of breathing moving air well no wheezing  Abdominal: Firm, distended, nontender  Musculoskeletal: Normal ROM.  No cyanosis. No calf tenderness.  Neurological: AAOx3, no focal deficits  Skin: Skin is warm and dry. No rash  Psychiatric: Normal mood and affect.       Result Review    Result Review:  I have personally reviewed the results from the time of this admission to 2022 14:15 EST and agree with these findings:  [x]  Laboratory  [x]  Microbiology  [x]  Radiology  [x]  EKG/Telemetry   []  Cardiology/Vascular   []  Pathology  []  Old records  []  Other:  Most notable findings include: Leukopenia, anemia, thrombocytopenia, renal insufficiency          Assessment & Plan      Brief Patient Summary:  Lei Mercado is a 67 y.o. male with history of cirrhosis presenting for evaluation of right-sided flank/abdominal pain and initiation of milo red blood and urine.  Patient reports he has been able to void but  there have been episodes of clots in his urine.  Patient recently in the hospital for paracentesis with significant anemia getting multiple transfusions before being admitted observation and then being discharged.  Patient return to emergency department after the initiation of, pain with hematuria.  Patient with a initial hemoglobin of 7.5 on 12/26/2022 which decreased to 5.7 patient transfused 3 units of PRBCs.  GI was consulted from the observation unit for endoscopy.    12/27/2022: Patient reports hematuria overall improving.  Continue to monitor hemoglobin.  Patient had EGD today with no definitive signs of active bleeding.  Urology evaluated hematuria recommending to continue antibiotics at this time and monitor.  Discussion of possible colonoscopy in the near future.    albumin human, 37.5 g, Intravenous, Once   Or  albumin human, 50 g, Intravenous, Once   Or  albumin human, 62.5 g, Intravenous, Once   Or  albumin human, 75 g, Intravenous, Once   Or  albumin human, 87.5 g, Intravenous, Once   Or  albumin human, 100 g, Intravenous, Once   Or  albumin human, 112.5 g, Intravenous, Once  cefTRIAXone, 1 g, Intravenous, Q24H  ferric gluconate, 250 mg, Intravenous, Once  insulin glargine, 10 Units, Subcutaneous, Nightly  insulin lispro, 2-9 Units, Subcutaneous, TID With Meals  montelukast, 10 mg, Oral, Daily  pantoprazole, 40 mg, Oral, BID AC  rosuvastatin, 10 mg, Oral, Daily  sertraline, 200 mg, Oral, Daily  sodium chloride, 10 mL, Intravenous, Q12H  sodium chloride, 3 mL, Intravenous, Q12H  tamsulosin, 0.4 mg, Oral, Daily       octreotide (SandoSTATIN) infusion, 25 mcg/hr, Last Rate: 25 mcg/hr (12/28/22 0752)  sodium chloride, 100 mL/hr, Last Rate: 100 mL/hr (12/28/22 0620)         Active Hospital Problems:  Active Hospital Problems    Diagnosis    • **Hematuria    • Hematuria, unspecified type    • Anemia    • 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)      Plan:     Right flank pain-with associated hematuria  evaluated by urology and felt to be secondary to underlying stone compounded by patient's bleeding and possible coagulopathy has since resolved  -Patient voiding   -Rocephin  -Urology consulted and recommended 1 week of antibiotics  -Urine culture no growth at this time  -Monitor hemoglobin    Anemia-uncertain source of bleeding though concern for GI and urinary source patient is required multiple transfusions over the last week now dropping again  -Given the leukopenia and thrombocytopenia with consult hematology  -Transfusing another unit 12/28/2022 t  -Continue trending hemoglobin  -If continues to require transfusion may need to transfuse coagulation factors    Thrombocytopenia-uncertain source at this time if related to patient's cirrhosis  -Continue trending  -Fibrinogen appropriate  -Hematology consultation    Leukopenia-uncertain if secondary to underlying infection or if associated with some bone marrow suppression  -Continue trending  -Continue Rocephin  -Hematology consultation    Cirrhosis-patient previous diagnosed history with recent paracentesis outpatient, being worked up by GI at this time  -Octreotide started by GI  -EGD with no active bleeding  -Monitor volume status  -Paracentesis planned 12/28/2022  -Monitor for any coagulopathy    Diabetes mellitus-keep blood sugars less than 200  -Sliding scale  -Long-acting  -Diabetic diet    Depression/hyperlipidemia/GERD-chronic in nature  -Resume home medication as clinically appropriate    DVT prophylaxis:  Mechanical DVT prophylaxis orders are present.    CODE STATUS:    Level Of Support Discussed With: Patient  Code Status (Patient has no pulse and is not breathing): CPR (Attempt to Resuscitate)  Medical Interventions (Patient has pulse or is breathing): Full Support      Disposition:  I expect patient to be discharged in possibly 24 hours 12/29/2022.    This patient has been examined wearing appropriate Personal Protective Equipment and discussed with  hospital infection control department. 12/28/22      Electronically signed by Bimal Hartman MD, 12/28/22, 14:15 EST.  [facility] Hospitalist Team

## 2022-12-29 ENCOUNTER — READMISSION MANAGEMENT (OUTPATIENT)
Dept: CALL CENTER | Facility: HOSPITAL | Age: 67
End: 2022-12-29

## 2022-12-29 VITALS
OXYGEN SATURATION: 95 % | TEMPERATURE: 97.9 F | RESPIRATION RATE: 18 BRPM | BODY MASS INDEX: 29.26 KG/M2 | HEIGHT: 74 IN | DIASTOLIC BLOOD PRESSURE: 61 MMHG | SYSTOLIC BLOOD PRESSURE: 107 MMHG | WEIGHT: 227.96 LBS | HEART RATE: 82 BPM

## 2022-12-29 LAB
ALBUMIN SERPL ELPH-MCNC: 2.7 G/DL (ref 2.9–4.4)
ALBUMIN/GLOB SERPL: 1.1 {RATIO} (ref 0.7–1.7)
ALPHA1 GLOB SERPL ELPH-MCNC: 0.4 G/DL (ref 0–0.4)
ALPHA2 GLOB SERPL ELPH-MCNC: 0.4 G/DL (ref 0.4–1)
ANION GAP SERPL CALCULATED.3IONS-SCNC: 9 MMOL/L (ref 5–15)
B-GLOBULIN SERPL ELPH-MCNC: 0.8 G/DL (ref 0.7–1.3)
BASOPHILS # BLD AUTO: 0 10*3/MM3 (ref 0–0.2)
BASOPHILS NFR BLD AUTO: 1.1 % (ref 0–1.5)
BUN SERPL-MCNC: 41 MG/DL (ref 8–23)
BUN/CREAT SERPL: 20.5 (ref 7–25)
CALCIUM SPEC-SCNC: 8.3 MG/DL (ref 8.6–10.5)
CHLORIDE SERPL-SCNC: 102 MMOL/L (ref 98–107)
CO2 SERPL-SCNC: 22 MMOL/L (ref 22–29)
CREAT SERPL-MCNC: 2 MG/DL (ref 0.76–1.27)
DEPRECATED RDW RBC AUTO: 68.3 FL (ref 37–54)
EGFRCR SERPLBLD CKD-EPI 2021: 35.9 ML/MIN/1.73
EOSINOPHIL # BLD AUTO: 0.1 10*3/MM3 (ref 0–0.4)
EOSINOPHIL NFR BLD AUTO: 2.4 % (ref 0.3–6.2)
ERYTHROCYTE [DISTWIDTH] IN BLOOD BY AUTOMATED COUNT: 20.6 % (ref 12.3–15.4)
GAMMA GLOB SERPL ELPH-MCNC: 1 G/DL (ref 0.4–1.8)
GLOBULIN SER CALC-MCNC: 2.5 G/DL (ref 2.2–3.9)
GLUCOSE BLDC GLUCOMTR-MCNC: 358 MG/DL (ref 70–105)
GLUCOSE SERPL-MCNC: 389 MG/DL (ref 65–99)
HCT VFR BLD AUTO: 24.9 % (ref 37.5–51)
HGB BLD-MCNC: 7.9 G/DL (ref 13–17.7)
LABORATORY COMMENT REPORT: ABNORMAL
LYMPHOCYTES # BLD AUTO: 0.4 10*3/MM3 (ref 0.7–3.1)
LYMPHOCYTES NFR BLD AUTO: 17.3 % (ref 19.6–45.3)
M PROTEIN SERPL ELPH-MCNC: ABNORMAL G/DL
MCH RBC QN AUTO: 29.9 PG (ref 26.6–33)
MCHC RBC AUTO-ENTMCNC: 31.5 G/DL (ref 31.5–35.7)
MCV RBC AUTO: 94.9 FL (ref 79–97)
MONOCYTES # BLD AUTO: 0.2 10*3/MM3 (ref 0.1–0.9)
MONOCYTES NFR BLD AUTO: 9.6 % (ref 5–12)
NEUTROPHILS NFR BLD AUTO: 1.7 10*3/MM3 (ref 1.7–7)
NEUTROPHILS NFR BLD AUTO: 69.6 % (ref 42.7–76)
NRBC BLD AUTO-RTO: 0.2 /100 WBC (ref 0–0.2)
PLATELET # BLD AUTO: 112 10*3/MM3 (ref 140–450)
PMV BLD AUTO: 7.4 FL (ref 6–12)
POTASSIUM SERPL-SCNC: 4.5 MMOL/L (ref 3.5–5.2)
PROT PATTERN SERPL ELPH-IMP: ABNORMAL
PROT SERPL-MCNC: 5.2 G/DL (ref 6–8.5)
RBC # BLD AUTO: 2.62 10*6/MM3 (ref 4.14–5.8)
SODIUM SERPL-SCNC: 133 MMOL/L (ref 136–145)
WBC NRBC COR # BLD: 2.5 10*3/MM3 (ref 3.4–10.8)

## 2022-12-29 PROCEDURE — 82962 GLUCOSE BLOOD TEST: CPT

## 2022-12-29 PROCEDURE — 99232 SBSQ HOSP IP/OBS MODERATE 35: CPT | Performed by: INTERNAL MEDICINE

## 2022-12-29 PROCEDURE — 85025 COMPLETE CBC W/AUTO DIFF WBC: CPT | Performed by: NURSE PRACTITIONER

## 2022-12-29 PROCEDURE — 25010000002 CEFTRIAXONE PER 250 MG: Performed by: PHYSICIAN ASSISTANT

## 2022-12-29 PROCEDURE — 80048 BASIC METABOLIC PNL TOTAL CA: CPT | Performed by: NURSE PRACTITIONER

## 2022-12-29 PROCEDURE — 63710000001 INSULIN LISPRO (HUMAN) PER 5 UNITS: Performed by: NURSE PRACTITIONER

## 2022-12-29 RX ORDER — CEFDINIR 300 MG/1
300 CAPSULE ORAL 2 TIMES DAILY
Qty: 8 CAPSULE | Refills: 0 | Status: SHIPPED | OUTPATIENT
Start: 2022-12-29 | End: 2023-01-02

## 2022-12-29 RX ORDER — FOLIC ACID 1 MG/1
1 TABLET ORAL DAILY
Qty: 30 TABLET | Refills: 0 | Status: SHIPPED | OUTPATIENT
Start: 2022-12-30 | End: 2023-01-20

## 2022-12-29 RX ORDER — PANTOPRAZOLE SODIUM 40 MG/1
40 TABLET, DELAYED RELEASE ORAL
Qty: 60 TABLET | Refills: 0 | Status: SHIPPED | OUTPATIENT
Start: 2022-12-29 | End: 2023-01-28

## 2022-12-29 RX ORDER — FOLIC ACID 1 MG/1
1 TABLET ORAL DAILY
Status: DISCONTINUED | OUTPATIENT
Start: 2022-12-29 | End: 2022-12-29 | Stop reason: HOSPADM

## 2022-12-29 RX ADMIN — Medication 10 ML: at 08:34

## 2022-12-29 RX ADMIN — INSULIN LISPRO 8 UNITS: 100 INJECTION, SOLUTION INTRAVENOUS; SUBCUTANEOUS at 08:33

## 2022-12-29 RX ADMIN — FOLIC ACID 1 MG: 1 TABLET ORAL at 08:32

## 2022-12-29 RX ADMIN — TAMSULOSIN HYDROCHLORIDE 0.4 MG: 0.4 CAPSULE ORAL at 08:32

## 2022-12-29 RX ADMIN — PANTOPRAZOLE SODIUM 40 MG: 40 TABLET, DELAYED RELEASE ORAL at 08:32

## 2022-12-29 RX ADMIN — Medication 3 ML: at 08:34

## 2022-12-29 RX ADMIN — CEFTRIAXONE 1 G: 1 INJECTION, POWDER, FOR SOLUTION INTRAMUSCULAR; INTRAVENOUS at 08:32

## 2022-12-29 RX ADMIN — SODIUM CHLORIDE 100 ML/HR: 4.5 INJECTION, SOLUTION INTRAVENOUS at 03:00

## 2022-12-29 RX ADMIN — Medication 5 MG: at 00:15

## 2022-12-29 NOTE — CONSULTS
Hematology/Oncology Inpatient Progress Note    PATIENT NAME: Lei Mercado  : 1955  MRN: [id number]    CHIEF COMPLAINT: Flank pain, blood in urine    HISTORY OF PRESENT ILLNESS:   Lei Mercado is a 67 y.o. male who presented to [facility] on 2022 with complaints of right-sided abdominal pain, right flank pain and hematuria with clots.  The patient has a history of kidney stones, Perales cirrhosis, microcytic anemia, recent paracentesis, and recent admission for severe anemia with hemoglobin of 4.  The patient relates that he has an appointment with a specialist in [place] next month to evaluate for possible liver transplantation.  The patient was admitted for further evaluation and management.     CT abdomen pelvis 2022- new right-sided hydroureteronephrosis-mild secondary to a faint 2 mm stone in the distal right ureter; liver morphology of cirrhosis, diffuse ascites and splenomegaly; new left pleural effusion     CT abdomen pelvis 2022- new right-sided perinephric fat stranding suggesting UTI such as pyelonephritis; improvement in right-sided hydronephrosis; previously demonstrated 2 mm right ureteral stone is no longer visualized; changes of cirrhosis with nodular appearance of liver and splenomegaly; moderate to large amount of ascites throughout the abdomen and pelvis; moderate left pleural effusion; cholecystolithiasis     US liver 2022- changes of cirrhosis, ascites     22  Hematology/Oncology was consulted for PERALES cirrhosis, persistent anemia, thrombocytopenia, leukopenia.The patient has history of pancytopenia and labs sent .  His WBC averages 2-3, Hgb averages 7-8, and platelets average 100-110.  The patient's pancytopenia is likely related to his worsening liver function.  Patient's current level of WBC 2.1, Hgb 7.0, platelets 100 with ANC at 1500.  His fibrinogen is elevated, recent LDH normal which rules out DIC. His Vitamin B12 is at adequate  level. His iron studies reflect anemia of chronic disease with normal iron, adequate saturation and ferritin with low TIBC.  The patient has also had blood loss with urinary stone that has passed according to CT scan. The patient has also had melanotic stools and gastropathy on EGD. The patient's CT abdomen/pelvis shows enlarged spleen which also contributes to thrombocytopenia, anemia.  Oncology will assess stool for blood, SPEP for any malignant process of bone marrow, direct jonatan for hemolytic anemia process, and reticulocytes to assess bone marrow response to anemia.     He  has a past medical history of Anemia (09/2011), Anesthesia complication, Cancer (HCC), Cirrhosis (HCC), Colon polyp, Diabetes mellitus (HCC), Diverticulitis of colon, Esophageal varices (HCC), Fatty liver, Gallstones, Hyperlipidemia, Hypertension, and Liver disease.     PCP: Froylan June    History of present illness was reviewed and is unchanged from the previous visit. 12/29/22    Subjective   12/29/2022: Feeling reasonably well and would like to be discharged.  Eating well and no nausea.  Defecated but did not notify his nurse and does the sample for occult blood was not sent.  No hematemesis.  No chest pains or abdominal pain.  Underwent paracentesis without difficulties.  ROS:  Review of Systems   Constitutional: Negative for chills, fatigue and fever.   HENT: Negative.    Eyes: Negative.    Respiratory: Negative for shortness of breath.    Cardiovascular: Negative for chest pain and palpitations.   Gastrointestinal: Positive for abdominal pain and blood in stool (patient admits melena).   Endocrine: Negative.    Genitourinary: Positive for hematuria.   Musculoskeletal: Negative.    Skin: Negative for rash and wound.   Neurological: Negative.    Psychiatric/Behavioral: Negative for behavioral problems.      MEDICATIONS:    Scheduled Meds:  cefTRIAXone, 1 g, Intravenous, Q24H  folic acid, 1 mg, Oral, Daily  insulin glargine, 10 Units,  "Subcutaneous, Nightly  insulin lispro, 2-9 Units, Subcutaneous, TID With Meals  montelukast, 10 mg, Oral, Daily  pantoprazole, 40 mg, Oral, BID AC  rosuvastatin, 10 mg, Oral, Daily  sertraline, 200 mg, Oral, Daily  sodium chloride, 10 mL, Intravenous, Q12H  sodium chloride, 3 mL, Intravenous, Q12H       Continuous Infusions:  octreotide (SandoSTATIN) infusion, 25 mcg/hr, Last Rate: Stopped (12/28/22 1130)  sodium chloride, 100 mL/hr, Last Rate: 100 mL/hr (12/29/22 0643)       PRN Meds:    polyethylene glycol **AND** bisacodyl **AND** bisacodyl    dextrose    dextrose    glucagon (human recombinant)    HYDROmorphone    melatonin    ondansetron **OR** ondansetron    [COMPLETED] Insert Peripheral IV **AND** sodium chloride    sodium chloride    sodium chloride    sodium chloride    sodium chloride     ALLERGIES:  No Known Allergies    Objective    VITALS:   /61 (BP Location: Right arm, Patient Position: Lying)   Pulse 72   Temp 97.7 °F (36.5 °C) (Oral)   Resp 17   Ht 188 cm (74\")   Wt 103 kg (227 lb 15.3 oz)   SpO2 98%   BMI 29.27 kg/m²     PHYSICAL EXAM: (performed by MD)  Physical Exam  Vitals and nursing note reviewed.   HENT:      Head: Normocephalic.   Eyes:      Pupils: Pupils are equal, round, and reactive to light.   Cardiovascular:      Rate and Rhythm: Normal rate and regular rhythm.      Pulses: Normal pulses.   Pulmonary:      Effort: Pulmonary effort is normal.   Abdominal:      General: Bowel sounds are normal. There is distension.   Musculoskeletal:         General: Normal range of motion.      Cervical back: Normal range of motion.   Skin:     General: Skin is warm.   Neurological:      Mental Status: He is alert and oriented to person, place, and time.   Psychiatric:         Behavior: Behavior normal.           RECENT LABS:  Lab Results (last 24 hours)       Procedure Component Value Units Date/Time    Body Fluid Culture - Body Fluid, Peritoneum [[id number]] Collected: 12/28/22 1403    " Specimen: Body Fluid from Peritoneum Updated: 12/29/22 0705     Body Fluid Culture No growth at less than 24 hours     Gram Stain Rare (1+) WBCs per low power field      No organisms seen    CBC & Differential [[id number]]  (Abnormal) Collected: 12/29/22 0534    Specimen: Blood from Arm, Left Updated: 12/29/22 0632    Narrative:      The following orders were created for panel order CBC & Differential.  Procedure                               Abnormality         Status                     ---------                               -----------         ------                     CBC Auto Differential[[id number]]        Abnormal            Final result                 Please view results for these tests on the individual orders.    CBC Auto Differential [[id number]]  (Abnormal) Collected: 12/29/22 0534    Specimen: Blood from Arm, Left Updated: 12/29/22 0632     WBC 2.50 10*3/mm3      RBC 2.62 10*6/mm3      Hemoglobin 7.9 g/dL      Hematocrit 24.9 %      MCV 94.9 fL      MCH 29.9 pg      MCHC 31.5 g/dL      RDW 20.6 %      RDW-SD 68.3 fl      MPV 7.4 fL      Platelets 112 10*3/mm3      Neutrophil % 69.6 %      Lymphocyte % 17.3 %      Monocyte % 9.6 %      Eosinophil % 2.4 %      Basophil % 1.1 %      Neutrophils, Absolute 1.70 10*3/mm3      Lymphocytes, Absolute 0.40 10*3/mm3      Monocytes, Absolute 0.20 10*3/mm3      Eosinophils, Absolute 0.10 10*3/mm3      Basophils, Absolute 0.00 10*3/mm3      nRBC 0.2 /100 WBC     Basic Metabolic Panel [[id number]]  (Abnormal) Collected: 12/29/22 0534    Specimen: Blood from Arm, Left Updated: 12/29/22 0630     Glucose 389 mg/dL      BUN 41 mg/dL      Creatinine 2.00 mg/dL      Sodium 133 mmol/L      Potassium 4.5 mmol/L      Chloride 102 mmol/L      CO2 22.0 mmol/L      Calcium 8.3 mg/dL      BUN/Creatinine Ratio 20.5     Anion Gap 9.0 mmol/L      eGFR 35.9 mL/min/1.73      Comment: National Kidney Foundation and American Society of Nephrology (ASN) Task Force recommended calculation  based on the Chronic Kidney Disease Epidemiology Collaboration (CKD-EPI) equation refit without adjustment for race.       Narrative:      GFR Normal >60  Chronic Kidney Disease <60  Kidney Failure <15      Hemoglobin & Hematocrit, Blood [[id number]]  (Abnormal) Collected: 12/28/22 2140    Specimen: Blood from Arm, Right Updated: 12/28/22 2151     Hemoglobin 7.1 g/dL      Hematocrit 22.4 %     POC Glucose Once [[id number]]  (Abnormal) Collected: 12/28/22 2123    Specimen: Blood Updated: 12/28/22 2125     Glucose 417 mg/dL      Comment: Serial Number: [id number]:  [id number]       Albumin, Fluid - Body Fluid, Peritoneum [[id number]] Collected: 12/28/22 1403    Specimen: Body Fluid from Peritoneum Updated: 12/28/22 1938     Albumin, Fluid 1.10 g/dL     Narrative:      No Reference Ranges Established.    A Serous fluid albumin gradient (serum albumin-fluid) <1.1 g/dL suggests the fluid is an exudate.  Cirrhosis usually results in an ascites fluid albumin gradient >1.1 g/dL.    This test was developed, its performance characteristics determined and judged suitable for clinical purposes by [facility].  It has not been cleared or approved by the FDA.  The laboratory is regulated under CLIA as qualified to perfom high-complexity testing.      Protein, Body Fluid - Peritoneal Fluid, Peritoneum [[id number]] Collected: 12/28/22 1403    Specimen: Peritoneal Fluid from Peritoneum Updated: 12/28/22 1936     Protein, Total, Fluid 2.1 g/dL     Narrative:      No Reference Ranges Established.    A serous fluid total fluid (TP) greater than 50 percent of the serum TP suggests the fluid is an exudate.      1. Pleural TP/Serum TP >0.5  2. Pleural LD/Serum LD >0.6  3. Pleural LD >2/3 of the upper limit of normal for serum LDH    This test was developed, it performance characteristics determined and judged suitable for clinical purposes by [facility].  It has not been cleared or  approved by the FDA.  The laboratory is regulated under CLIA as qualified to perform high-complexity testing.     POC Glucose Once [[id number]]  (Abnormal) Collected: 12/28/22 1639    Specimen: Blood Updated: 12/28/22 1640     Glucose 329 mg/dL      Comment: Serial Number: [id number]:  [id number]       Body Fluid Cell Count With Differential - Body Fluid, Peritoneum [[id number]] Collected: 12/28/22 1403    Specimen: Body Fluid from Peritoneum Updated: 12/28/22 1456    Narrative:      The following orders were created for panel order Body Fluid Cell Count With Differential - Body Fluid, Peritoneum.  Procedure                               Abnormality         Status                     ---------                               -----------         ------                     Body fluid cell count - ...[[id number]]                      Final result               Body fluid differential ...[[id number]]                      Final result                 Please view results for these tests on the individual orders.    Body fluid differential - Body Fluid, Peritoneum [[id number]] Collected: 12/28/22 1403    Specimen: Body Fluid from Peritoneum Updated: 12/28/22 1456     Neutrophils, Fluid 13 %      Lymphocytes, Fluid 48 %      Monocytes, Fluid 33 %      Mesothelial Cells, Fluid 6 %     Narrative:      Concentrated Smear by Cytocentrifuge Prep.    Body fluid cell count - Body Fluid, Peritoneum [[id number]] Collected: 12/28/22 1403    Specimen: Body Fluid from Peritoneum Updated: 12/28/22 1454     Color, Fluid Yellow     Appearance, Fluid Clear     Nucleated Cells, Fluid 131 /mm3      Method: Automated DXH Analyzer    Hemoglobin & Hematocrit, Blood [[id number]]  (Abnormal) Collected: 12/28/22 1244    Specimen: Blood Updated: 12/28/22 1309     Hemoglobin 6.5 g/dL      Hematocrit 20.6 %     Protein Elec + Interp, Serum [[id number]] Collected: 12/28/22 1244    Specimen: Blood Updated: 12/28/22 1254    POC Glucose Once [[id number]]   (Abnormal) Collected: 12/28/22 1126    Specimen: Blood Updated: 12/28/22 1128     Glucose 356 mg/dL      Comment: Serial Number: [id number]:  [id number]       Hemoglobin A1c [[id number]]  (Normal) Collected: 12/28/22 0614    Specimen: Blood Updated: 12/28/22 1002     Hemoglobin A1C 5.0 %     Narrative:      Hemoglobin A1C Reference Range:    <5.7 %        Normal  5.7-6.4 %     Increased risk for diabetes  > 6.4 %        Diabetes       These guidelines have been recommended by the American Diabetic Association for Hgb A1c.      The following 2010 guidelines have been recommended by the American Diabetes Association for Hemoglobin A1c.    HBA1c 5.7-6.4% Increased risk for future diabetes (pre-diabetes)  HBA1c     >6.4% Diabetes              PENDING RESULTS:     IMAGING REVIEWED:  US Liver    Result Date: 12/27/2022   1. Changes of cirrhosis and ascites.  Electronically Signed By-Larry Bowen MD On:12/27/2022 1:47 PM This report was finalized on [id number] by  Larry Bowen MD.      Assessment & Plan   ASSESSMENT:  A 67-year-old patient admitted with severe anemia and previously diagnosed with urolithiasis experiencing pain and hematuria.  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  Patient is being considered for transplantation at the time of this consultation.  Patient underwent EGD with reported portal hypertension gastropathy and friable mucosa that bled easily upon contact.  There was no blood identified within the stomach or the first part of the duodenum however bleeding was identified at the time of the procedure.  The patient has carried a history of anemia that dates back a few years before this admission but it never had been as severe as this admission.  He reports being very fatigued and having to sleep for long periods of time.  The patient admits feeling better after transfusions but his fatigue would recur rapidly.  He gave a history of  early-stage right renal cell carcinoma for which she underwent nephron preserving surgery.  He also has history of diabetes and cholelithiasis.  He is also receiving treatment for hyperlipidemia and hypertension.  The patient had undergone several surgical procedures and an upper gastrointestinal endoscopy sometime around 2018.  The patient's mother apparently  of complications of nonalcoholic steatohepatitis.  The patient does not exhibit any jaundice upon exam, abdomen is rounded and soft and liver and spleen did not seem to be enlarged.  His extremities do show edema and laboratory exams reported anemia.  A fecal occult blood test has been requested, SHANIKA ordered was negative, recheck ordered and showing elevation with response of bone marrow to anemia, and SPEP is pending.      Anemia: Hgb 7.9, normocytic, normochromic  Patient is requiring multiple transfusions to maintain his hemoglobin level  Likely related to cirrhosis, urinary blood loss with renal stone, patient admission of dark/tarry stools  EGD 2022- showing portal hypertensive gastropathy, patchy erythema of entire stomach with friable mucosa  SHANIKA-negative, SPEP pending at this point my inclination is to continue to think that he has anemia secondary to bleeding.  He turned out to have low folate and thus supplementation has been started but I am not sure that this has had a significant participation in the picture that brought him to the hospital.  We will continue to monitor.    Thrombocytopenia: Platelets 112, mild, improved.  At this point he does not need any intervention.      Leukopenia: WBC 2.5, ANC 1.7.  Mild leukopenia, likely related to his splenomegaly.  At this point no intervention but will continue to monitor.    Cirrhosis  CT 2022-showing cirrhotic morphology of the liver with diffuse ascites  CT 2022-liver is small and nodular in configuration that is compatible with cirrhosis, moderate amount of abdominal  ascites  Ultrasound of liver 12/27/2022-changes of cirrhosis and ascites      Other chronic conditions: Diabetes mellitus, depression, hyperlipidemia, GERD      PLAN:  As above      Evangelista Jones MD on 12/29/2022 at 1629

## 2022-12-29 NOTE — PLAN OF CARE
Goal Outcome Evaluation:  Plan of Care Reviewed With: patient        Progress: improving  Outcome Evaluation: Patient is hoping to discharge home today and follow up with ambulatory care for monitoring of his blood count and continued paracentesis.

## 2022-12-29 NOTE — DISCHARGE SUMMARY
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         Hospital Course     Hospital Course:    Lei Mercado is a 67 y.o. male with history of cirrhosis presenting for evaluation of right-sided flank/abdominal pain and initiation of milo red blood and urine.  Patient reports he has been able to void but there have been episodes of clots in his urine.  Patient recently in the hospital for paracentesis with significant anemia getting multiple transfusions before being admitted observation and then being discharged.  Patient return to emergency department after the initiation of, pain with hematuria.  Patient with a initial hemoglobin of 7.5 on 2022 which decreased to 5.7 patient transfused 3 units of PRBCs.  GI was consulted from the observation unit for endoscopy.     2022: Patient reports hematuria overall improving.  Continue to monitor hemoglobin.  Patient had EGD today with no definitive signs of active bleeding.  Urology evaluated hematuria recommending to continue antibiotics at this time and monitor.  Discussion of possible colonoscopy in the near future.    2022:   Patient reports no further blood in urine.  Patient overall feeling much better.  Hematology evaluated patient ordered a few  more tests.  Patient getting paracentesis today.  Patient's hemoglobin A1c of 5.1. Hemoglobin initially stabilizing and then decreased to 6.5 showing continue drift and transfusing another unit.    12/29/2022:  After transfusion yesterday afternoon patient's hemoglobin appears to be stabilizing most recent at 7.9.  Patient denies any gross bleeding.  Patient seen by hematologist discussed patient may be having microbleed's from GI tract and will likely need to be transfusion dependent at least for the near future.  Started on folic acid supplementation.  Patient will have follow-up hemoglobin with regularly scheduled outpatient paracentesis.  Patient's ascitic fluid with no growth.  At this time patient will be switched to oral antibiotics per urologist to complete 7-day course for presumed urinary infection.  Follow-up organized with primary care, GI for colonoscopy discussion and hematologist.  Patient discharged home in good condition with strict return precautions given.      DISCHARGE Follow Up Recommendations for labs and diagnostics:     Patient wishes to discuss discontinuing metformin and trying different oral hypoglycemic due to intolerance of side effects    Follow-up hemoglobin ordered for 1/3/2023 1 patient getting next paracentesis    Reasons For Change In Medications and Indications for New Medications:    Stop Nexium start Protonix 40 mg twice daily for gastric inflammation  Omnicef 3 mg twice daily to complete 7-day course of antibiotics for acute cystitis possibly complicated  Folic acid 1 mg daily for supplementation    Day of Discharge     Vital Signs:  Temp:  [97 °F (36.1 °C)-98.8 °F (37.1 °C)] 97.7 °F (36.5 °C)  Heart Rate:  [68-92] 72  Resp:  [16-17] 17  BP: (101-131)/(38-74) 109/61    Physical Exam:  Physical Exam     General: Elderly male lying in bed breathing comfortably on room air no acute distress  HEENT: NC/AT, EOMI, mucosa moist  Heart: Regular, rate controlled  Chest: Normal work of  breathing moving air well no wheezing  Abdominal: Soft, mild distention, nontender  Musculoskeletal: Normal ROM.  No cyanosis. No calf tenderness.  Neurological: AAOx3, no focal deficits  Skin: Skin is warm and dry. No rash  Psychiatric: Normal mood and affect.      Pertinent  and/or Most Recent Results     LAB RESULTS:      Lab 12/29/22  0534 12/28/22  2140 12/28/22  1244 12/28/22  0614 12/28/22  0058 12/27/22  1751 12/27/22  1150 12/27/22  0557 12/26/22  1148 12/23/22  0723 12/23/22  0001 12/22/22  1630 12/22/22  1630 12/22/22  1303   WBC 2.50*  --   --   --  2.10*  --  1.70* 1.70* 3.70 2.20* 2.10*  --  1.60* 1.90*   HEMOGLOBIN 7.9* 7.1* 6.5* 7.4* 7.0*  7.1*   < > 6.3* 5.7* 7.5* 8.4* 6.8*  --  4.7* 4.9*   HEMATOCRIT 24.9* 22.4* 20.6* 23.1* 21.2*  21.3*   < > 19.6* 18.2* 24.0* 26.6* 21.4*  20.7*  --  15.1* 15.8*   PLATELETS 112*  --   --   --  100*  --  99* 99* 120* 106* 101*  --  92* 93*   NEUTROS ABS 1.70  --   --   --  1.50*  --   --  1.43* 3.10 1.60* 1.50*   < > 1.10*  --    LYMPHS ABS 0.40*  --   --   --  0.40*  --   --   --  0.30* 0.40* 0.30*  --   --   --    MONOS ABS 0.20  --   --   --  0.20  --   --   --  0.20 0.20 0.30  --   --   --    EOS ABS 0.10  --   --   --  0.00  --   --   --  0.00 0.00 0.00  --  0.03  --    MCV 94.9  --   --   --  95.8  --  96.5 99.7* 99.6* 97.7* 97.3*  --  101.4* 102.3*   LDH  --   --   --  264*  --   --   --   --   --   --   --   --  183  --    PROTIME  --   --   --  12.2*  --   --  11.2  --  11.7  --   --   --  12.0* 12.0*   APTT  --   --   --   --   --   --   --   --  34.5*  --   --   --  27.5*  --     < > = values in this interval not displayed.         Lab 12/29/22  0534 12/28/22  0614 12/27/22  0425 12/26/22  1148 12/23/22  0723   SODIUM 133* 130* 132* 130* 133*   POTASSIUM 4.5 4.8 4.5 4.1 4.3   CHLORIDE 102 101 102 98 103   CO2 22.0 22.0 22.0 21.0* 20.0*   ANION GAP 9.0 7.0 8.0 11.0 10.0   BUN 41* 46* 51* 51* 33*   CREATININE 2.00* 1.95* 2.03* 2.16* 2.16*   EGFR 35.9*  37.0* 35.3* 32.7* 32.7*   GLUCOSE 389* 409* 386* 441* 243*   CALCIUM 8.3* 8.1* 8.1* 8.7 8.1*   HEMOGLOBIN A1C  --  5.0  --  5.1  --          Lab 12/28/22  0614 12/26/22  1148 12/22/22  1630   TOTAL PROTEIN 5.8* 6.3 6.2   ALBUMIN 2.9* 3.30* 4.00   GLOBULIN 2.9 3.0 2.2   ALT (SGPT) 6 7 8   AST (SGOT) 9 10 7   BILIRUBIN 2.8* 5.0* 2.8*   ALK PHOS 120* 126* 100   LIPASE  --  49  --          Lab 12/28/22  0614 12/27/22  1150 12/26/22  1148 12/22/22  1630 12/22/22  1303   PROTIME 12.2* 11.2 11.7 12.0* 12.0*   INR 1.20* 1.09 1.14* 1.18* 1.18*             Lab 12/28/22  0614 12/27/22  1150 12/26/22  1148 12/23/22  0001 12/22/22  1630 12/22/22  1431   IRON  --   --   --   --  73  --    IRON SATURATION  --   --   --   --  39  --    TIBC  --   --   --   --  186*  --    TRANSFERRIN  --   --   --   --  125*  --    FERRITIN 791.90*  --   --   --   --   --    FOLATE  --  4.42*  --   --   --   --    VITAMIN B 12  --   --   --  439  --   --    ABO TYPING  --   --  A  --   --  A   RH TYPING  --   --  Positive  --   --  Positive   ANTIBODY SCREEN  --   --  Positive  --   --  Positive         Brief Urine Lab Results  (Last result in the past 365 days)      Color   Clarity   Blood   Leuk Est   Nitrite   Protein   CREAT   Urine HCG        12/26/22 1305 Yellow   Cloudy  Comment: Result checked     Large (3+)   Negative   Negative   >=300 mg/dL (3+)               Microbiology Results (last 10 days)     Procedure Component Value - Date/Time    Body Fluid Culture - Body Fluid, Peritoneum [[id number]] Collected: 12/28/22 1403    Lab Status: Preliminary result Specimen: Body Fluid from Peritoneum Updated: 12/29/22 0705     Body Fluid Culture No growth at less than 24 hours     Gram Stain Rare (1+) WBCs per low power field      No organisms seen    Urine Culture - Urine, Urine, Clean Catch [[id number]]  (Normal) Collected: 12/26/22 1305    Lab Status: Final result Specimen: Urine, Clean Catch Updated: 12/27/22 2227     Urine Culture No growth           CT Abdomen Pelvis Without Contrast    Result Date: 12/26/2022  Impression:  1.  New right-sided perinephric fat stranding suggesting urinary tract infection such as pyelonephritis.  There is been interval improvement in right-sided hydronephrosis.  Previously demonstrated 2 mm right ureteral stone is no longer visualized. 2.  Changes of cirrhosis with nodular appearance of the liver and splenomegaly. 3.  Moderate to large amount of ascites throughout the abdomen and pelvis similar prior exam. 4.  No change in moderate left pleural effusion. 5.  Cholelithiasis.  Electronically Signed By-Yong Harrison MD On:12/26/2022 5:17 PM This report was finalized on [id number] by  Yong Harrison MD.    CT Abdomen Pelvis Without Contrast    Result Date: 12/22/2022  Impression: New right-sided hydroureteronephrosis, mild and felt to be secondary to a faint 2 mm stone in the distal right ureter. Liver morphology of cirrhosis, diffuse ascites and splenomegaly are stable findings. There is a new left pleural effusion.    Electronically Signed By-Gage Jean Baptiste MD On:12/22/2022 7:50 PM This report was finalized on [id number] by  Gage Jean Baptiste MD.    US Liver    Result Date: 12/27/2022  Impression:  1. Changes of cirrhosis and ascites.  Electronically Signed By-Larry Bowen MD On:12/27/2022 1:47 PM This report was finalized on [id number] by  Larry Bowen MD.              Results for orders placed during the hospital encounter of 08/11/22    Adult Transthoracic Echo Complete W/ Cont if Necessary Per Protocol    Interpretation Summary  · Left ventricular systolic function is normal.  · Left ventricular ejection fraction is 55 to 60%  · Left ventricular wall thickness is consistent with mild concentric hypertrophy.  · Left ventricular diastolic function is consistent with (grade I) impaired relaxation.      Labs Pending at Discharge:  Pending Labs     Order Current Status    Protein Elec + Interp, Serum In process    Body  Fluid Culture - Body Fluid, Peritoneum Preliminary result          Procedures Performed  Procedure(s):  ESOPHAGOGASTRODUODENOSCOPY  12/28 1453 Diagnostic Bedside Paracentesis Without  Radiology  12/27 1030 UPPER GI ENDOSCOPY      Consults:   Consults     Date and Time Order Name Status Description    12/27/2022  4:52 PM Hematology & Oncology Inpatient Consult      12/27/2022  3:19 PM Inpatient Hospitalist Consult      12/27/2022  8:26 AM Inpatient Gastroenterology Consult Completed     12/26/2022  1:57 PM Urology (on-call MD unless specified) Completed             Discharge Details        Discharge Medications      New Medications      Instructions Start Date   folic acid 1 MG tablet  Commonly known as: FOLVITE   1 mg, Oral, Daily   Start Date: December 30, 2022     pantoprazole 40 MG EC tablet  Commonly known as: PROTONIX  Replaces: esomeprazole 40 MG capsule   40 mg, Oral, 2 Times Daily Before Meals         Continue These Medications      Instructions Start Date   metFORMIN  MG 24 hr tablet  Commonly known as: GLUCOPHAGE-XR   500 mg, Oral, Every Evening      montelukast 10 MG tablet  Commonly known as: SINGULAIR   10 mg, Oral, Every Night at Bedtime      pioglitazone 15 MG tablet  Commonly known as: ACTOS   15 mg, Oral, Every Evening      rosuvastatin 10 MG tablet  Commonly known as: CRESTOR   10 mg, Oral, Nightly      sertraline 100 MG tablet  Commonly known as: ZOLOFT   200 mg, Oral, Every Evening      terazosin 10 MG capsule  Commonly known as: HYTRIN   10 mg, Oral, Nightly         Stop These Medications    esomeprazole 40 MG capsule  Commonly known as: nexIUM  Replaced by: pantoprazole 40 MG EC tablet            No Known Allergies      Discharge Disposition: Good  Home or Self Care    Diet:  Hospital:  Diet Order   Procedures   • Diet: Diabetic Diets, Cardiac Diets; Healthy Heart (2-3 Na+); Consistent Carbohydrate; Texture: Regular Texture (IDDSI 7); Fluid Consistency: Thin (IDDSI 0)         Discharge  Activity:   Activity Instructions     Activity as Tolerated              CODE STATUS:  Code Status and Medical Interventions:   Ordered at: 12/26/22 1604     Level Of Support Discussed With:    Patient     Code Status (Patient has no pulse and is not breathing):    CPR (Attempt to Resuscitate)     Medical Interventions (Patient has pulse or is breathing):    Full Support         Future Appointments   Date Time Provider Department Center   1/3/2023  8:00 AM ROOM 09 - [facility] None   1/16/2023 11:45 AM ROOM 09 - [facility] None       Additional Instructions for the Follow-ups that You Need to Schedule     Discharge Follow-up with PCP   As directed       Currently Documented PCP:    Froylan June    PCP Phone Number:    [phone]     Follow Up Details: Please follow-up primary care within a week         Discharge Follow-up with Specified Provider: Please follow-up with gastroenterology in 4 to 6 weeks to discuss colonoscopy   As directed      To: Please follow-up with gastroenterology in 4 to 6 weeks to discuss colonoscopy         Discharge Follow-up with Specified Provider: Please follow-up with hematologist in 2 to 3 weeks continue monitoring your hemoglobin levels   As directed      To: Please follow-up with hematologist in 2 to 3 weeks continue monitoring your hemoglobin levels         Hemoglobin & Hematocrit, Blood    Jan 03, 2023 (Approximate)      Release to patient: Routine Release               Time spent on Discharge including face to face service:  35 minutes    This patient has been examined wearing appropriate Personal Protective Equipment and discussed with hospital infection control department. 12/29/22      Signature: Electronically signed by Bimal Hartman MD, 12/29/22, 9:13 AM EST.

## 2022-12-29 NOTE — PLAN OF CARE
Problem: Adult Inpatient Plan of Care  Goal: Plan of Care Review  Outcome: Ongoing, Not Progressing  Flowsheets (Taken 12/29/2022 0441)  Progress: no change  Plan of Care Reviewed With: patient  Outcome Evaluation: patient getting H&H every 12 hours this evening's level was 7.1 per hem/onc note do not have to transfuse unless it is less than 7, VSS, SR on the monitor, falls risk maintained, pt stable will monitor  Goal: Patient-Specific Goal (Individualized)  Outcome: Ongoing, Not Progressing  Goal: Absence of Hospital-Acquired Illness or Injury  Outcome: Ongoing, Not Progressing  Intervention: Identify and Manage Fall Risk  Recent Flowsheet Documentation  Taken 12/29/2022 0400 by Cynthia Manzano, RN  Safety Promotion/Fall Prevention: safety round/check completed  Taken 12/29/2022 0200 by Cynthia Manzano RN  Safety Promotion/Fall Prevention: safety round/check completed  Taken 12/28/2022 2200 by Cynthia Manzano RN  Safety Promotion/Fall Prevention: safety round/check completed  Taken 12/28/2022 2002 by Cynthia Manzano, RN  Safety Promotion/Fall Prevention:   safety round/check completed   activity supervised   assistive device/personal items within reach   clutter free environment maintained   fall prevention program maintained   lighting adjusted   nonskid shoes/slippers when out of bed   room organization consistent  Intervention: Prevent Skin Injury  Recent Flowsheet Documentation  Taken 12/28/2022 2002 by Cynthia Manzano, RN  Body Position:   supine   sitting up in bed   position changed independently  Intervention: Prevent and Manage VTE (Venous Thromboembolism) Risk  Recent Flowsheet Documentation  Taken 12/28/2022 2002 by Cynthia Manzano, RN  Activity Management: activity adjusted per tolerance  Goal: Optimal Comfort and Wellbeing  Outcome: Ongoing, Not Progressing  Intervention: Provide Person-Centered Care  Recent Flowsheet Documentation  Taken 12/28/2022 2002 by Cynthia Manzano, RN  Trust  Relationship/Rapport:   care explained   choices provided   emotional support provided   empathic listening provided   questions answered   questions encouraged   reassurance provided   thoughts/feelings acknowledged  Goal: Readiness for Transition of Care  Outcome: Ongoing, Not Progressing     Problem: Urinary Elimination Management  Goal: Effective Urinary Elimination/Continence  Outcome: Ongoing, Not Progressing     Problem: Pain Acute  Goal: Acceptable Pain Control and Functional Ability  Outcome: Ongoing, Not Progressing  Intervention: Prevent or Manage Pain  Recent Flowsheet Documentation  Taken 12/28/2022 2002 by Cynthia Manzano, RN  Sensory Stimulation Regulation: care clustered  Sleep/Rest Enhancement: awakenings minimized  Medication Review/Management: medications reviewed  Intervention: Optimize Psychosocial Wellbeing  Recent Flowsheet Documentation  Taken 12/28/2022 2002 by Cynthia Manzano, RN  Supportive Measures: active listening utilized  Diversional Activities:   smartphone   television     Problem: Anemia  Goal: Anemia Symptom Improvement  Outcome: Ongoing, Not Progressing  Intervention: Monitor and Manage Anemia  Recent Flowsheet Documentation  Taken 12/29/2022 0400 by Cynthia Manzano, RN  Safety Promotion/Fall Prevention: safety round/check completed  Taken 12/29/2022 0200 by Cynthia Manzano, RN  Safety Promotion/Fall Prevention: safety round/check completed  Taken 12/28/2022 2200 by Cynthia Manzano RN  Safety Promotion/Fall Prevention: safety round/check completed  Taken 12/28/2022 2002 by Cynthia Manzano, RN  Safety Promotion/Fall Prevention:   safety round/check completed   activity supervised   assistive device/personal items within reach   clutter free environment maintained   fall prevention program maintained   lighting adjusted   nonskid shoes/slippers when out of bed   room organization consistent     Problem: Coping Ineffective (Oncology Care)  Goal: Effective Coping  Outcome: Ongoing,  Not Progressing  Intervention: Support and Enhance Coping Strategies  Recent Flowsheet Documentation  Taken 12/28/2022 2002 by Cynthia Manzano RN  Supportive Measures: active listening utilized  Environmental Support: calm environment promoted  Family/Support System Care:   self-care encouraged   support provided     Problem: Fatigue (Oncology Care)  Goal: Improved Activity Tolerance  Outcome: Ongoing, Not Progressing  Intervention: Promote Improved Energy  Recent Flowsheet Documentation  Taken 12/28/2022 2002 by Cynthia Manzano RN  Activity Management: activity adjusted per tolerance  Sleep/Rest Enhancement: awakenings minimized     Problem: Oral Intake Altered (Oncology Care)  Goal: Optimal Oral Intake  Outcome: Ongoing, Not Progressing     Problem: Oral Mucositis (Oncology Care)  Goal: Improved Oral Mucous Membrane Integrity  Outcome: Ongoing, Not Progressing     Problem: Pain Acute (Oncology Care)  Goal: Optimal Pain Control  Outcome: Ongoing, Not Progressing  Intervention: Prevent or Manage Pain  Recent Flowsheet Documentation  Taken 12/28/2022 2002 by Cynthia Manzano RN  Sensory Stimulation Regulation: care clustered  Sleep/Rest Enhancement: awakenings minimized  Medication Review/Management: medications reviewed  Intervention: Optimize Psychosocial Wellbeing  Recent Flowsheet Documentation  Taken 12/28/2022 2002 by Cynthia Manzano RN  Supportive Measures: active listening utilized  Diversional Activities:   smartphone   television     Problem: Skin Injury Risk Increased  Goal: Skin Health and Integrity  Outcome: Ongoing, Not Progressing  Intervention: Optimize Skin Protection  Recent Flowsheet Documentation  Taken 12/28/2022 2002 by Cynthia Manzano RN  Head of Bed (HOB) Positioning: HOB at 30-45 degrees     Problem: Diabetes Comorbidity  Goal: Blood Glucose Level Within Targeted Range  Outcome: Ongoing, Not Progressing  Intervention: Monitor and Manage Glycemia  Recent Flowsheet Documentation  Taken  12/28/2022 2002 by Cynthia Manzano RN  Glycemic Management:   blood glucose monitored   supplemental insulin given     Problem: Hypertension Comorbidity  Goal: Blood Pressure in Desired Range  Outcome: Ongoing, Not Progressing  Intervention: Maintain Blood Pressure Management  Recent Flowsheet Documentation  Taken 12/28/2022 2002 by Cynthia Manzano RN  Medication Review/Management: medications reviewed     Problem: Breathing Pattern Ineffective  Goal: Effective Breathing Pattern  Outcome: Ongoing, Not Progressing  Intervention: Promote Improved Breathing Pattern  Recent Flowsheet Documentation  Taken 12/28/2022 2002 by Cynthia Manzano RN  Supportive Measures: active listening utilized  Head of Bed (HOB) Positioning: HOB at 30-45 degrees     Problem: Behavioral Health Comorbidity  Goal: Maintenance of Behavioral Health Symptom Control  Outcome: Ongoing, Not Progressing  Intervention: Maintain Behavioral Health Symptom Control  Recent Flowsheet Documentation  Taken 12/28/2022 2002 by Cynthia Manzano RN  Medication Review/Management: medications reviewed     Problem: Activity and Energy Impairment (Anxiety Signs/Symptoms)  Goal: Optimized Energy Level (Anxiety Signs/Symptoms)  Outcome: Ongoing, Not Progressing     Problem: Cognitive Impairment (Anxiety Signs/Symptoms)  Goal: Optimized Cognitive Function (Anxiety Signs/Symptoms)  Outcome: Ongoing, Not Progressing     Problem: Mood Impairment (Anxiety Signs/Symptoms)  Goal: Improved Mood Symptoms (Anxiety Signs/Symptoms)  Outcome: Ongoing, Not Progressing  Intervention: Optimize Emotion and Mood  Recent Flowsheet Documentation  Taken 12/28/2022 2002 by Cynthia Manzano RN  Supportive Measures: active listening utilized     Problem: Sleep Impairment (Anxiety Signs/Symptoms)  Goal: Improved Sleep (Anxiety Signs/Symptoms)  Outcome: Ongoing, Not Progressing     Problem: Social, Occupational or Functional Impairment (Anxiety Signs/Symptoms)  Goal: Enhanced Social,  Occupational or Functional Skills (Anxiety Signs/Symptoms)  Outcome: Ongoing, Not Progressing  Intervention: Promote Social, Occupational and Functional Ability  Recent Flowsheet Documentation  Taken 12/28/2022 2002 by Cynthia Manzano RN  Trust Relationship/Rapport:   care explained   choices provided   emotional support provided   empathic listening provided   questions answered   questions encouraged   reassurance provided   thoughts/feelings acknowledged     Problem: Somatic Disturbance (Anxiety Signs/Symptoms)  Goal: Improved Somatic Symptoms (Anxiety Signs/Symptoms)  Outcome: Ongoing, Not Progressing     Problem: Fall Injury Risk  Goal: Absence of Fall and Fall-Related Injury  Outcome: Ongoing, Not Progressing  Intervention: Identify and Manage Contributors  Recent Flowsheet Documentation  Taken 12/28/2022 2002 by Cynthia Manzano RN  Medication Review/Management: medications reviewed  Intervention: Promote Injury-Free Environment  Recent Flowsheet Documentation  Taken 12/29/2022 0400 by Cynthia Manzano, RN  Safety Promotion/Fall Prevention: safety round/check completed  Taken 12/29/2022 0200 by Cynthia Manzano RN  Safety Promotion/Fall Prevention: safety round/check completed  Taken 12/28/2022 2200 by Cynthia Manzano RN  Safety Promotion/Fall Prevention: safety round/check completed  Taken 12/28/2022 2002 by Cynthia Manzano, RN  Safety Promotion/Fall Prevention:   safety round/check completed   activity supervised   assistive device/personal items within reach   clutter free environment maintained   fall prevention program maintained   lighting adjusted   nonskid shoes/slippers when out of bed   room organization consistent   Goal Outcome Evaluation:  Plan of Care Reviewed With: patient        Progress: no change  Outcome Evaluation: patient getting H&H every 12 hours this evening's level was 7.1 per hem/onc note do not have to transfuse unless it is less than 7, VSS, SR on the monitor, falls risk  maintained, pt stable will monitor

## 2022-12-30 LAB
BH BB BLOOD EXPIRATION DATE: NORMAL
BH BB BLOOD EXPIRATION DATE: NORMAL
BH BB BLOOD TYPE BARCODE: 6200
BH BB BLOOD TYPE BARCODE: 6200
BH BB DISPENSE STATUS: NORMAL
BH BB DISPENSE STATUS: NORMAL
BH BB PRODUCT CODE: NORMAL
BH BB PRODUCT CODE: NORMAL
BH BB UNIT NUMBER: NORMAL
BH BB UNIT NUMBER: NORMAL
CROSSMATCH INTERPRETATION: NORMAL
CROSSMATCH INTERPRETATION: NORMAL
UNIT  ABO: NORMAL
UNIT  ABO: NORMAL
UNIT  RH: NORMAL
UNIT  RH: NORMAL

## 2022-12-30 NOTE — CASE MANAGEMENT/SOCIAL WORK
Case Management Discharge Note      Final Note: Patient to follow up with ambulatory care for OP labs and paracentesis.      Transportation Services  Private: Car (assumed with family)    Final Discharge Disposition Code: 01 - home or self-care

## 2022-12-30 NOTE — OUTREACH NOTE
Prep Survey    Flowsheet Row Responses   [facility] patient discharged from? [facility]   Is LACE score < 7 ? No   Eligibility Readm Mgmt   Discharge diagnosis **Hematuria    Does the patient have one of the following disease processes/diagnoses(primary or secondary)? Other   Does the patient have Home health ordered? No   Is there a DME ordered? No   Prep survey completed? Yes          KAY CRANE - Registered Nurse

## 2023-01-02 LAB
BACTERIA FLD CULT: NORMAL
GRAM STN SPEC: NORMAL
GRAM STN SPEC: NORMAL

## 2023-01-02 RX ORDER — ALBUMIN (HUMAN) 12.5 G/50ML
12.5 SOLUTION INTRAVENOUS DAILY PRN
Status: CANCELLED | OUTPATIENT
Start: 2023-01-03

## 2023-01-02 RX ORDER — ALBUMIN (HUMAN) 12.5 G/50ML
25 SOLUTION INTRAVENOUS DAILY PRN
Status: CANCELLED | OUTPATIENT
Start: 2023-01-03

## 2023-01-02 RX ORDER — LIDOCAINE HYDROCHLORIDE 10 MG/ML
10 INJECTION, SOLUTION INFILTRATION; PERINEURAL AS NEEDED
Status: CANCELLED | OUTPATIENT
Start: 2023-01-03

## 2023-01-03 ENCOUNTER — HOSPITAL ENCOUNTER (OUTPATIENT)
Dept: INFUSION THERAPY | Facility: HOSPITAL | Age: 68
Discharge: HOME | End: 2023-01-03
Admitting: INTERNAL MEDICINE
Payer: MEDICARE

## 2023-01-03 ENCOUNTER — READMISSION MANAGEMENT (OUTPATIENT)
Dept: CALL CENTER | Facility: HOSPITAL | Age: 68
End: 2023-01-03
Payer: MEDICARE

## 2023-01-03 VITALS
DIASTOLIC BLOOD PRESSURE: 50 MMHG | OXYGEN SATURATION: 100 % | RESPIRATION RATE: 17 BRPM | SYSTOLIC BLOOD PRESSURE: 120 MMHG | HEART RATE: 70 BPM

## 2023-01-03 DIAGNOSIS — D64.9 ANEMIA, UNSPECIFIED TYPE: Primary | ICD-10-CM

## 2023-01-03 LAB
BB HOLD TUBE: NORMAL
HCT VFR BLD AUTO: 19.8 % (ref 37.5–51)
HGB BLD-MCNC: 6.2 G/DL (ref 13–17.7)
PRESERVATIVE ANTIBODY: NORMAL

## 2023-01-03 PROCEDURE — 36415 COLL VENOUS BLD VENIPUNCTURE: CPT

## 2023-01-03 PROCEDURE — 36430 TRANSFUSION BLD/BLD COMPNT: CPT

## 2023-01-03 PROCEDURE — 76942 ECHO GUIDE FOR BIOPSY: CPT

## 2023-01-03 PROCEDURE — 86900 BLOOD TYPING SEROLOGIC ABO: CPT | Performed by: INTERNAL MEDICINE

## 2023-01-03 PROCEDURE — 86901 BLOOD TYPING SEROLOGIC RH(D): CPT | Performed by: INTERNAL MEDICINE

## 2023-01-03 PROCEDURE — 86870 RBC ANTIBODY IDENTIFICATION: CPT | Performed by: INTERNAL MEDICINE

## 2023-01-03 PROCEDURE — 86923 COMPATIBILITY TEST ELECTRIC: CPT

## 2023-01-03 PROCEDURE — 96365 THER/PROPH/DIAG IV INF INIT: CPT

## 2023-01-03 PROCEDURE — P9047 ALBUMIN (HUMAN), 25%, 50ML: HCPCS | Performed by: INTERNAL MEDICINE

## 2023-01-03 PROCEDURE — P9016 RBC LEUKOCYTES REDUCED: HCPCS

## 2023-01-03 PROCEDURE — 86900 BLOOD TYPING SEROLOGIC ABO: CPT

## 2023-01-03 PROCEDURE — 86850 RBC ANTIBODY SCREEN: CPT | Performed by: INTERNAL MEDICINE

## 2023-01-03 PROCEDURE — 96366 THER/PROPH/DIAG IV INF ADDON: CPT

## 2023-01-03 PROCEDURE — 85018 HEMOGLOBIN: CPT | Performed by: INTERNAL MEDICINE

## 2023-01-03 PROCEDURE — 25010000002 ALBUMIN HUMAN 25% PER 50 ML: Performed by: INTERNAL MEDICINE

## 2023-01-03 PROCEDURE — 85014 HEMATOCRIT: CPT | Performed by: INTERNAL MEDICINE

## 2023-01-03 RX ORDER — SODIUM CHLORIDE 9 MG/ML
250 INJECTION, SOLUTION INTRAVENOUS AS NEEDED
Status: CANCELLED | OUTPATIENT
Start: 2023-01-04

## 2023-01-03 RX ORDER — LIDOCAINE HYDROCHLORIDE 10 MG/ML
10 INJECTION, SOLUTION INFILTRATION; PERINEURAL AS NEEDED
Status: DISCONTINUED | OUTPATIENT
Start: 2023-01-03 | End: 2023-01-05 | Stop reason: HOSPADM

## 2023-01-03 RX ORDER — SODIUM CHLORIDE 9 MG/ML
250 INJECTION, SOLUTION INTRAVENOUS AS NEEDED
Status: DISCONTINUED | OUTPATIENT
Start: 2023-01-04 | End: 2023-01-05 | Stop reason: HOSPADM

## 2023-01-03 RX ORDER — ALBUMIN (HUMAN) 12.5 G/50ML
12.5 SOLUTION INTRAVENOUS DAILY PRN
Status: DISCONTINUED | OUTPATIENT
Start: 2023-01-03 | End: 2023-01-05 | Stop reason: HOSPADM

## 2023-01-03 RX ORDER — ALBUMIN (HUMAN) 12.5 G/50ML
25 SOLUTION INTRAVENOUS DAILY PRN
Status: DISCONTINUED | OUTPATIENT
Start: 2023-01-03 | End: 2023-01-05 | Stop reason: HOSPADM

## 2023-01-03 RX ADMIN — ALBUMIN (HUMAN) 75 G: 0.25 INJECTION, SOLUTION INTRAVENOUS at 14:50

## 2023-01-03 NOTE — OUTREACH NOTE
Medical Week 1 Survey    Flowsheet Row Responses   [facility] patient discharged from? [facility]   Does the patient have one of the following disease processes/diagnoses(primary or secondary)? Other   Week 1 attempt successful? No  [Reached son, Sergio, who talked with patient yesterday-states \"doing okay\". Unsure of meds/appts-will have patient call if any questions/concerns.]   Unsuccessful attempts Attempt 1          LOREN MCDONALD - Registered Nurse

## 2023-01-04 ENCOUNTER — HOSPITAL ENCOUNTER (OUTPATIENT)
Dept: INFUSION THERAPY | Facility: HOSPITAL | Age: 68
Setting detail: INFUSION SERIES
Discharge: HOME | End: 2023-01-04
Payer: MEDICARE

## 2023-01-04 VITALS
SYSTOLIC BLOOD PRESSURE: 122 MMHG | TEMPERATURE: 97.8 F | RESPIRATION RATE: 18 BRPM | OXYGEN SATURATION: 100 % | DIASTOLIC BLOOD PRESSURE: 67 MMHG | HEART RATE: 70 BPM

## 2023-01-04 DIAGNOSIS — D50.8 OTHER IRON DEFICIENCY ANEMIA: Primary | ICD-10-CM

## 2023-01-04 LAB
ABO GROUP BLD: NORMAL
BLD GP AB SCN SERPL QL: NEGATIVE
BLD GP AB SCN SERPL QL: POSITIVE
RH BLD: POSITIVE
T&S EXPIRATION DATE: NORMAL

## 2023-01-04 PROCEDURE — 36415 COLL VENOUS BLD VENIPUNCTURE: CPT

## 2023-01-04 PROCEDURE — 36430 TRANSFUSION BLD/BLD COMPNT: CPT

## 2023-01-04 PROCEDURE — P9016 RBC LEUKOCYTES REDUCED: HCPCS

## 2023-01-04 PROCEDURE — 86900 BLOOD TYPING SEROLOGIC ABO: CPT

## 2023-01-04 RX ORDER — SODIUM CHLORIDE 9 MG/ML
250 INJECTION, SOLUTION INTRAVENOUS AS NEEDED
Status: CANCELLED | OUTPATIENT
Start: 2023-01-04

## 2023-01-05 ENCOUNTER — TELEHEALTH PROVIDED IN PATIENT'S HOME (AMBULATORY)
Dept: URBAN - METROPOLITAN AREA TELEHEALTH 4 | Facility: TELEHEALTH | Age: 68
End: 2023-01-05

## 2023-01-05 VITALS — HEIGHT: 74 IN

## 2023-01-05 DIAGNOSIS — D50.9 IRON DEFICIENCY ANEMIA, UNSPECIFIED: ICD-10-CM

## 2023-01-05 DIAGNOSIS — K74.60 UNSPECIFIED CIRRHOSIS OF LIVER: ICD-10-CM

## 2023-01-05 DIAGNOSIS — K62.5 HEMORRHAGE OF ANUS AND RECTUM: ICD-10-CM

## 2023-01-05 PROCEDURE — 99214 OFFICE O/P EST MOD 30 MIN: CPT | Performed by: INTERNAL MEDICINE

## 2023-01-06 ENCOUNTER — ANESTHESIA (OUTPATIENT)
Dept: GASTROENTEROLOGY | Facility: HOSPITAL | Age: 68
End: 2023-01-06
Payer: MEDICARE

## 2023-01-06 ENCOUNTER — HOSPITAL ENCOUNTER (OUTPATIENT)
Facility: HOSPITAL | Age: 68
Setting detail: HOSPITAL OUTPATIENT SURGERY
Discharge: HOME | End: 2023-01-06
Attending: INTERNAL MEDICINE | Admitting: INTERNAL MEDICINE
Payer: MEDICARE

## 2023-01-06 ENCOUNTER — ANESTHESIA EVENT (OUTPATIENT)
Dept: GASTROENTEROLOGY | Facility: HOSPITAL | Age: 68
End: 2023-01-06
Payer: MEDICARE

## 2023-01-06 ENCOUNTER — READMISSION MANAGEMENT (OUTPATIENT)
Dept: CALL CENTER | Facility: HOSPITAL | Age: 68
End: 2023-01-06
Payer: MEDICARE

## 2023-01-06 ENCOUNTER — ON CAMPUS - OUTPATIENT (AMBULATORY)
Dept: URBAN - METROPOLITAN AREA HOSPITAL 85 | Facility: HOSPITAL | Age: 68
End: 2023-01-06

## 2023-01-06 VITALS
HEIGHT: 74 IN | HEART RATE: 77 BPM | OXYGEN SATURATION: 100 % | TEMPERATURE: 97.7 F | RESPIRATION RATE: 18 BRPM | WEIGHT: 220.02 LBS | BODY MASS INDEX: 28.24 KG/M2 | DIASTOLIC BLOOD PRESSURE: 46 MMHG | SYSTOLIC BLOOD PRESSURE: 99 MMHG

## 2023-01-06 DIAGNOSIS — K64.0 FIRST DEGREE HEMORRHOIDS: ICD-10-CM

## 2023-01-06 DIAGNOSIS — R18.8 OTHER ASCITES: ICD-10-CM

## 2023-01-06 DIAGNOSIS — R18.8 CIRRHOSIS OF LIVER WITH ASCITES, UNSPECIFIED HEPATIC CIRRHOSIS TYPE: ICD-10-CM

## 2023-01-06 DIAGNOSIS — D50.9 IDA (IRON DEFICIENCY ANEMIA): ICD-10-CM

## 2023-01-06 DIAGNOSIS — K74.60 UNSPECIFIED CIRRHOSIS OF LIVER: ICD-10-CM

## 2023-01-06 DIAGNOSIS — K74.60 CIRRHOSIS OF LIVER WITH ASCITES, UNSPECIFIED HEPATIC CIRRHOSIS TYPE: ICD-10-CM

## 2023-01-06 DIAGNOSIS — D50.9 IRON DEFICIENCY ANEMIA, UNSPECIFIED: ICD-10-CM

## 2023-01-06 DIAGNOSIS — R18.8 ASCITES: ICD-10-CM

## 2023-01-06 DIAGNOSIS — K75.81 NASH (NONALCOHOLIC STEATOHEPATITIS): ICD-10-CM

## 2023-01-06 DIAGNOSIS — K74.60 CIRRHOSIS: ICD-10-CM

## 2023-01-06 DIAGNOSIS — D12.2 BENIGN NEOPLASM OF ASCENDING COLON: ICD-10-CM

## 2023-01-06 DIAGNOSIS — K62.5 RECTAL BLEEDING: ICD-10-CM

## 2023-01-06 DIAGNOSIS — K62.5 HEMORRHAGE OF ANUS AND RECTUM: ICD-10-CM

## 2023-01-06 DIAGNOSIS — K55.21 ANGIODYSPLASIA OF COLON WITH HEMORRHAGE: ICD-10-CM

## 2023-01-06 DIAGNOSIS — K62.1 RECTAL POLYP: ICD-10-CM

## 2023-01-06 LAB
BASOPHILS # BLD AUTO: 0 10*3/MM3 (ref 0–0.2)
BASOPHILS NFR BLD AUTO: 1 % (ref 0–1.5)
DEPRECATED RDW RBC AUTO: 64.3 FL (ref 37–54)
EOSINOPHIL # BLD AUTO: 0 10*3/MM3 (ref 0–0.4)
EOSINOPHIL NFR BLD AUTO: 0.6 % (ref 0.3–6.2)
ERYTHROCYTE [DISTWIDTH] IN BLOOD BY AUTOMATED COUNT: 19.3 % (ref 12.3–15.4)
GLUCOSE BLDC GLUCOMTR-MCNC: 439 MG/DL (ref 70–105)
HCT VFR BLD AUTO: 23.2 % (ref 37.5–51)
HGB BLD-MCNC: 7.3 G/DL (ref 13–17.7)
LYMPHOCYTES # BLD AUTO: 0.3 10*3/MM3 (ref 0.7–3.1)
LYMPHOCYTES NFR BLD AUTO: 11.1 % (ref 19.6–45.3)
MCH RBC QN AUTO: 30.2 PG (ref 26.6–33)
MCHC RBC AUTO-ENTMCNC: 31.4 G/DL (ref 31.5–35.7)
MCV RBC AUTO: 96.2 FL (ref 79–97)
MONOCYTES # BLD AUTO: 0.2 10*3/MM3 (ref 0.1–0.9)
MONOCYTES NFR BLD AUTO: 6.8 % (ref 5–12)
NEUTROPHILS NFR BLD AUTO: 2 10*3/MM3 (ref 1.7–7)
NEUTROPHILS NFR BLD AUTO: 80.5 % (ref 42.7–76)
NRBC BLD AUTO-RTO: 0.1 /100 WBC (ref 0–0.2)
PLATELET # BLD AUTO: 139 10*3/MM3 (ref 140–450)
PMV BLD AUTO: 7.5 FL (ref 6–12)
RBC # BLD AUTO: 2.42 10*6/MM3 (ref 4.14–5.8)
WBC NRBC COR # BLD: 2.5 10*3/MM3 (ref 3.4–10.8)

## 2023-01-06 PROCEDURE — 45380 COLONOSCOPY AND BIOPSY: CPT | Mod: 59 | Performed by: INTERNAL MEDICINE

## 2023-01-06 PROCEDURE — C1769 GUIDE WIRE: HCPCS | Performed by: INTERNAL MEDICINE

## 2023-01-06 PROCEDURE — 25010000002 PROPOFOL 10 MG/ML EMULSION: Performed by: ANESTHESIOLOGY

## 2023-01-06 PROCEDURE — 63710000001 INSULIN LISPRO (HUMAN) PER 5 UNITS

## 2023-01-06 PROCEDURE — 88305 TISSUE EXAM BY PATHOLOGIST: CPT | Performed by: INTERNAL MEDICINE

## 2023-01-06 PROCEDURE — 85025 COMPLETE CBC W/AUTO DIFF WBC: CPT | Performed by: INTERNAL MEDICINE

## 2023-01-06 PROCEDURE — 25010000002 METOCLOPRAMIDE PER 10 MG: Performed by: INTERNAL MEDICINE

## 2023-01-06 PROCEDURE — 82962 GLUCOSE BLOOD TEST: CPT

## 2023-01-06 PROCEDURE — 45385 COLONOSCOPY W/LESION REMOVAL: CPT | Performed by: INTERNAL MEDICINE

## 2023-01-06 PROCEDURE — 45388 COLONOSCOPY W/ABLATION: CPT | Mod: 59 | Performed by: INTERNAL MEDICINE

## 2023-01-06 RX ORDER — SODIUM CHLORIDE 0.9 % (FLUSH) 0.9 %
10 SYRINGE (ML) INJECTION AS NEEDED
Status: DISCONTINUED | OUTPATIENT
Start: 2023-01-06 | End: 2023-01-07 | Stop reason: HOSPADM

## 2023-01-06 RX ORDER — ONDANSETRON 2 MG/ML
4 INJECTION INTRAMUSCULAR; INTRAVENOUS ONCE AS NEEDED
Status: DISCONTINUED | OUTPATIENT
Start: 2023-01-06 | End: 2023-01-07 | Stop reason: HOSPADM

## 2023-01-06 RX ORDER — PROPOFOL 10 MG/ML
VIAL (ML) INTRAVENOUS AS NEEDED
Status: DISCONTINUED | OUTPATIENT
Start: 2023-01-06 | End: 2023-01-06 | Stop reason: SURG

## 2023-01-06 RX ORDER — METOCLOPRAMIDE HYDROCHLORIDE 5 MG/ML
10 INJECTION INTRAMUSCULAR; INTRAVENOUS
Status: DISCONTINUED | OUTPATIENT
Start: 2023-01-06 | End: 2023-01-07 | Stop reason: HOSPADM

## 2023-01-06 RX ORDER — SODIUM CHLORIDE 0.9 % (FLUSH) 0.9 %
3 SYRINGE (ML) INJECTION EVERY 12 HOURS SCHEDULED
Status: DISCONTINUED | OUTPATIENT
Start: 2023-01-06 | End: 2023-01-07 | Stop reason: HOSPADM

## 2023-01-06 RX ORDER — INSULIN LISPRO 100 [IU]/ML
INJECTION, SOLUTION INTRAVENOUS; SUBCUTANEOUS
Status: COMPLETED
Start: 2023-01-06 | End: 2023-01-06

## 2023-01-06 RX ORDER — SODIUM CHLORIDE 9 MG/ML
50 INJECTION, SOLUTION INTRAVENOUS CONTINUOUS
Status: DISCONTINUED | OUTPATIENT
Start: 2023-01-06 | End: 2023-01-07 | Stop reason: HOSPADM

## 2023-01-06 RX ORDER — LIDOCAINE HYDROCHLORIDE 20 MG/ML
INJECTION, SOLUTION INFILTRATION; PERINEURAL AS NEEDED
Status: DISCONTINUED | OUTPATIENT
Start: 2023-01-06 | End: 2023-01-06 | Stop reason: SURG

## 2023-01-06 RX ORDER — INSULIN LISPRO 100 [IU]/ML
9 INJECTION, SOLUTION INTRAVENOUS; SUBCUTANEOUS ONCE
Status: COMPLETED | OUTPATIENT
Start: 2023-01-06 | End: 2023-01-06

## 2023-01-06 RX ADMIN — INSULIN LISPRO 9 UNITS: 100 INJECTION, SOLUTION INTRAVENOUS; SUBCUTANEOUS at 11:38

## 2023-01-06 RX ADMIN — METOCLOPRAMIDE 10 MG: 5 INJECTION, SOLUTION INTRAMUSCULAR; INTRAVENOUS at 11:05

## 2023-01-06 RX ADMIN — PROPOFOL 300 MG: 10 INJECTION, EMULSION INTRAVENOUS at 12:17

## 2023-01-06 RX ADMIN — SODIUM CHLORIDE 50 ML/HR: 9 INJECTION, SOLUTION INTRAVENOUS at 11:04

## 2023-01-06 RX ADMIN — LIDOCAINE HYDROCHLORIDE 100 MG: 20 INJECTION, SOLUTION INFILTRATION; PERINEURAL at 12:17

## 2023-01-06 NOTE — ANESTHESIA POSTPROCEDURE EVALUATION
Patient: Lei Mercado    Procedure Summary     Date: 01/06/23 Room / Location: [facility] ENDOSCOPY 2 / [facility] ENDOSCOPY    Anesthesia Start: 1210 Anesthesia Stop: 1240    Procedure: COLONOSCOPY with argon plasma coagulation and polypectomy x 3 Diagnosis:       Ascites      Rectal bleeding      Cirrhosis (HCC)      FERNANDO (iron deficiency anemia)      (Ascites [R18.8])      (Rectal bleeding [K62.5])      (Cirrhosis (HCC) [K74.60])      (FERNANDO (iron deficiency anemia) [D50.9])    Surgeons: Kevin Park MD Provider: Munir Whitley MD    Anesthesia Type: MAC ASA Status: 4          Anesthesia Type: MAC    Vitals  Vitals Value Taken Time   /62 01/06/23 1302   Temp     Pulse 75 01/06/23 1307   Resp 18 01/06/23 1243   SpO2 100 % 01/06/23 1307   Vitals shown include unvalidated device data.        Post Anesthesia Care and Evaluation    Patient location during evaluation: PACU  Patient participation: complete - patient participated  Level of consciousness: awake  Pain scale: See nurse's notes for pain score.  Pain management: adequate    Airway patency: patent  Anesthetic complications: No anesthetic complications  PONV Status: none  Cardiovascular status: acceptable  Respiratory status: acceptable and spontaneous ventilation  Hydration status: acceptable    Comments: Patient seen and examined postoperatively; vital signs stable; SpO2 greater than or equal to 90%; cardiopulmonary status stable; nausea/vomiting adequately controlled; pain adequately controlled; no apparent anesthesia complications; patient discharged from anesthesia care when discharge criteria were met

## 2023-01-06 NOTE — OP NOTE
COLONOSCOPY Procedure Report    Patient Name:  Lei Mercado  YOB: 1955    Date of Surgery:  1/6/2023     Pre-Op Diagnosis:  Ascites [R18.8]  Rectal bleeding [K62.5]  Cirrhosis (HCC) [K74.60]  FERNANDO (iron deficiency anemia) [D50.9]       Postop diagnosis:  1.  Colon polyp  2.  Colon AVMs  3.  Internal hemorrhoids    Procedure/CPT® Codes:      Procedure(s):  COLONOSCOPY with argon plasma coagulation and polypectomy x 3    Staff:  Surgeon(s):  Kevin Park MD      Anesthesia: Monitored Anesthesia Care    Description of Procedure:  A description of the procedure as well as risks, benefits and alternative methods were explained to the patient who voiced understanding and signed the corresponding consent form. A physical exam was performed and vital signs were monitored throughout the procedure.    A rectal exam was performed which was normal. An Olympus colonoscope was placed into the rectum and proceeded under direct visualization through the colon until the cecum and appendiceal orifice were identified. Careful visualization occurred upon slow withdraw of the scope. The scope was then retroflexed and the distal rectum was visualized. The quality of the prep was good. The procedure was not difficult and there were no immediate complications.  There was no blood loss.    Impression:  1.  1 polyp in the ascending colon that was 2 mm and sessile removed via cold forcep biopsies and sent for histopathology  2.  1 polyp in the ascending colon that was 4 mm and sessile removed via cold snare and sent for histopathology  3.  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  The AVMs were small to medium size and APC was performed for ablation and hemostasis which was achieved.  4.  1 polyp in the rectum that was 4 mm and sessile removed via cold snare and sent for histopathology.   5.  Grade 1 large internal hemorrhoids with no signs of  active bleeding were present.    Recommendations:  1.  It is difficult to tell if the AVMs were the sole source of bleeding or if this from internal hemorrhoids.  Recommend internal hemorrhoid banding in the office and hydrocortisone suppositories  Check CBC right now to ensure hemoglobin greater than 7  No NSAIDs or blood thinners  Repeat colonoscopy in 5 years      Kevin Park MD     Date: 1/6/2023    Time: 12:46 EST

## 2023-01-06 NOTE — OUTREACH NOTE
Medical Week 1 Survey    Flowsheet Row Responses   [facility] facility patient discharged from? [facility]   Does the patient have one of the following disease processes/diagnoses(primary or secondary)? Other   Week 1 attempt successful? No   Unsuccessful attempts Attempt 2   Revoke Readmitted          GARRY NICK - Registered Nurse

## 2023-01-06 NOTE — ANESTHESIA PREPROCEDURE EVALUATION
Anesthesia Evaluation     Patient summary reviewed and Nursing notes reviewed   history of anesthetic complications:  NPO Solid Status: > 8 hours  NPO Liquid Status: > 8 hours           Airway   Dental      Pulmonary    (+) a smoker Former,   Cardiovascular     ECG reviewed    (+) hypertension, hyperlipidemia,       Neuro/Psych  GI/Hepatic/Renal/Endo    (+)  GI bleeding , liver disease cirrhosis, renal disease CRI, diabetes mellitus,     Musculoskeletal     Abdominal    Substance History      OB/GYN          Other   blood dyscrasia anemia thrombocytopenia,   history of cancer    ROS/Med Hx Other: Hyponatremia, hyperglycemia, hypocalcemia, low protein/albumin, elevated bilirubin, allergies, ascites, hematuria, esophageal varices, renal cancer, diverticulitis    Aspiration with anesthesia    Echocardiogram Findings    Left Ventricle Calculated left ventricular EF = 65% Left ventricular systolic function is normal.   Normal left ventricular cavity size noted. Left ventricular wall thickness is consistent with mild concentric hypertrophy. All left ventricular wall segments contract normally. Left ventricular diastolic function is consistent with (grade I) impaired relaxation. Normal left atrial pressure. No evidence of left ventricular thrombus or mass present.  Right Ventricle Normal right ventricular cavity size and systolic function noted.  Left Atrium Normal left atrial cavity size noted.  Right Atrium Normal right atrial cavity size noted.  Aortic Valve The aortic valve is structurally normal with no regurgitation or stenosis present.  Mitral Valve The mitral valve is grossly normal in structure. No mitral valve regurgitation or significant stenosis is present.  Tricuspid Valve The tricuspid valve is structurally normal with no significant stenosis present. Trace tricuspid valve regurgitation is present.  Pulmonic Valve The pulmonic valve is not well visualized.  Greater Vessels No dilation of the aortic root is  present.  Pericardium There is no evidence of pericardial effusion. .    PSH  NEPHRECTOMY SHOULDER SURGERY  UPPER GASTROINTESTINAL ENDOSCOPY COLONOSCOPY  ENDOSCOPY LIVER BIOPSY  ENDOSCOPY                 Anesthesia Plan    ASA 4     MAC     (Patient identified; pre-operative vital signs, all relevant labs/studies, complete medical/surgical/anesthetic history, full medication list, full allergy list, and NPO status obtained/reviewed; physical assessment performed; anesthetic options, side effects, potential complications, risks, and benefits discussed; questions answered; written anesthesia consent obtained; patient cleared for procedure; anesthesia machine and equipment checked and functioning)    Anesthetic plan, risks, benefits, and alternatives have been provided, discussed and informed consent has been obtained with: patient.    Plan discussed with CRNA and CAA.        CODE STATUS:

## 2023-01-06 NOTE — H&P
GI CONSULT  NOTE:    Referring Provider:    Froylan June  [unfilled]    Chief complaint: <principal problem not specified>    Subjective .       Pre op diagnosis  Ascites [R18.8]  Rectal bleeding [K62.5]  Cirrhosis (HCC) [K74.60]  FERNANDO (iron deficiency anemia) [D50.9]      History of present illness:      Lei Mercado is a 67 y.o. male who presents today for Procedure(s):  COLONOSCOPY for the indications listed below.     The updated Patient Profile was reviewed prior to the procedure, in conjunction with the Physical Exam, including medical conditions, surgical procedures, medications, allergies, family history and social history.     Pre-operatively, I reviewed the indication(s) for the procedure, the risks of the procedure [including but not limited to: unexpected bleeding possibly requiring hospitalization and/or unplanned repeat procedures, perforation possibly requiring surgical treatment, missed lesions and complications of sedation/MAC (also explained by anesthesia staff)].     I have evaluated the patient for risks associated with the planned anesthesia and the procedure to be performed and find the patient an acceptable candidate for IV sedation.    Multiple opportunities were provided for any questions or concerns, and all questions were answered satisfactorily before any anesthesia was administered. We will proceed with the planned procedure.    Past Medical History:  Past Medical History:   Diagnosis Date   • Anemia 09/2011   • Anesthesia complication     AWAKENED EARLY, ASPIRATION PNEUMONIA   • Cancer (HCC)     KIDNEY   • Cirrhosis (HCC)    • Colon polyp    • Diabetes mellitus (HCC)    • Diverticulitis of colon    • Esophageal varices (HCC)    • Fatty liver    • Gallstones    • Hyperlipidemia    • Hypertension    • Liver disease        Past Surgical History:  Past Surgical History:   Procedure Laterality Date   • COLONOSCOPY  approx 2018    benign polyps per pt    • ENDOSCOPY N/A 02/28/2022     "Procedure: ESOPHAGOGASTRODUODENOSCOPY;  Surgeon: David Almeida MD;  Location: [facility] ENDOSCOPY;  Service: Gastroenterology;  Laterality: N/A;  pre - ruq pain, hx parikh, cirrhosis  post - hiatal hernia   • ENDOSCOPY N/A 2022    Procedure: ESOPHAGOGASTRODUODENOSCOPY;  Surgeon: TANVIR Calixto MD;  Location: [facility] ENDOSCOPY;  Service: Gastroenterology;  Laterality: N/A;  post: portal hypertensive gastropathy, gastric polyps   • LIVER BIOPSY     • NEPHRECTOMY Right     partial    • SHOULDER SURGERY      right shoulder \"reverse\" per pt    • UPPER GASTROINTESTINAL ENDOSCOPY  approx 2018    negative per pt        Social History:  Social History     Tobacco Use   • Smoking status: Former     Packs/day: 1.00     Years: 15.00     Pack years: 15.00     Types: Cigarettes     Start date: 1971     Quit date: 1984     Years since quittin.6   • Smokeless tobacco: Never   • Tobacco comments:     quit 20 plus years ago    Vaping Use   • Vaping Use: Never used   Substance Use Topics   • Alcohol use: Never   • Drug use: Not Currently     Types: Marijuana     Comment: Abstinent for since early adulthood       Family History:  Family History   Problem Relation Age of Onset   • Cirrhosis Mother    • Other Father         Unknown   • Liver disease Sister    • Malig Hyperthermia Neg Hx        Medications:  Medications Prior to Admission   Medication Sig Dispense Refill Last Dose   • folic acid (FOLVITE) 1 MG tablet Take 1 tablet by mouth Daily for 30 days. 30 tablet 0 Past Week   • metFORMIN ER (GLUCOPHAGE-XR) 500 MG 24 hr tablet Take 500 mg by mouth Every Evening.   Past Week   • pantoprazole (PROTONIX) 40 MG EC tablet Take 1 tablet by mouth 2 (Two) Times a Day Before Meals for 30 days. 60 tablet 0 Past Week   • pioglitazone (ACTOS) 15 MG tablet Take 15 mg by mouth Every Evening.   Past Week   • rosuvastatin (CRESTOR) 10 MG tablet Take 10 mg by mouth Every Night.   Past Week   • sertraline (ZOLOFT) 100 MG tablet " "Take 200 mg by mouth Every Evening.   Past Week   • terazosin (HYTRIN) 10 MG capsule Take 10 mg by mouth Every Night.   Past Week   • montelukast (SINGULAIR) 10 MG tablet Take 10 mg by mouth every night at bedtime.          Scheduled Meds:metoclopramide, 10 mg, Intravenous, 4x Daily AC & at Bedtime      Continuous Infusions:sodium chloride, 50 mL/hr, Last Rate: 50 mL/hr (01/06/23 1104)      PRN Meds:.    ALLERGIES:  Patient has no known allergies.    ROS:  The following systems were reviewed and negative;  Constitution:  No fevers, chills, no unintentional weight loss  Skin: no rash, no jaundice  Eyes:  No blurry vision, no eye pain  HENT:  No change in hearing or smell  Resp:  No dyspnea or cough  CV:  No chest pain or palpitations  :  No dysuria, hematuria  Musculoskeletal:  No leg cramps or arthralgias  Neuro:  No tremor, no numbness  Psych:  No depression or confsusion    Objective     Vital Signs:   Vitals:    01/05/23 1518 01/06/23 1030   BP:  134/69   BP Location:  Left arm   Patient Position:  Sitting   Pulse:  99   Resp:  10   Temp:  97.7 °F (36.5 °C)   TempSrc:  Oral   SpO2:  100%   Weight: 99.8 kg (220 lb) 99.8 kg (220 lb 0.3 oz)   Height: 188 cm (74\") 188 cm (74\")       Physical Exam:       General Appearance:    Awake and alert, in no acute distress   Head:    Normocephalic, without obvious abnormality, atraumatic   Throat:   No oral lesions, no thrush, oral mucosa moist   Lungs:     respirations regular, even and unlabored   Skin:   No rash, no jaundice       Results Review:  Lab Results (last 24 hours)     Procedure Component Value Units Date/Time    POC Glucose Once [[id number]]  (Abnormal) Collected: 01/06/23 1118    Specimen: Blood Updated: 01/06/23 1122     Glucose 439 mg/dL      Comment: Serial Number: [id number]:  [id number]             Imaging Results (Last 24 Hours)     ** No results found for the last 24 hours. **           I reviewed the patient's labs and imaging.    ASSESSMENT AND " PLAN:      Active Problems:    * No active hospital problems. *       Procedure(s):  COLONOSCOPY      I discussed the patient's findings and my recommendations with the patient.    Kevin Park MD  01/06/23  12:12 EST

## 2023-01-06 NOTE — PROCEDURES
Procedure:Ultrasound-guided Paracentesis    Consent: Informed    Pre-op diagnosis: Cirrhosis, Massive ascites    Post-op diagnosis: Cirrhosis, Massive ascites    Anesthesia Provider: Sammie Nichols MD    Anesthesia type: Local infiltration with 1% Xylocaine    Surgeon: Sammie Nichols MD    Assistant: none    Procedure summary:  Of Note;  Time out  was done at the bedside with RN and patient  Allergies , labs and medications were reviewed. Patient was made to lie supine and the area of interest was imaged with ultrasound and fluid verified and marked.  Area of interest was cleaned and draped in a sterile fashion. Subsequently local infiltration with Xylocaine. Trocar and cannula were advanced. Upon verification of the fluid, trocar was steadied and cannula advanced further. Trocar was drawn out. Cannula was connected to extension tubing and to the vacuum bottle. Subsequently the cannula was removed after completion of the procedure.     Findings: Clear straw yellow fluid was obtained. See nursing documentation for volume drained.    Lab Specimen: NA    Complication: none    EBL: 0 mL    Post-op condition: Stable. Albumin was given by protocol if needed.    Post-op plan: Discharge back to the referring physician.        Procedure:Ultrasound-guided Paracentesis    Consent: Informed    Pre-op diagnosis: Cirrhosis, Massive ascites    Post-op diagnosis: Cirrhosis, Massive ascites    Anesthesia Provider: Sammie Nichols MD    Anesthesia type: Local infiltration with 1% Xylocaine    Surgeon: Sammie Nichols MD    Assistant: none    Procedure summary:  Of Note;  Time out  was done at the bedside with RN and patient  Allergies , labs and medications were reviewed. Patient was made to lie supine and the area of interest was imaged with ultrasound and fluid verified and marked.  Area of interest was cleaned and draped in a sterile fashion. Subsequently local infiltration with Xylocaine. Trocar and  cannula were advanced. Upon verification of the fluid, trocar was steadied and cannula advanced further. Trocar was drawn out. Cannula was connected to extension tubing and to the vacuum bottle. Subsequently the cannula was removed after completion of the procedure.     Findings: Clear straw yellow fluid was obtained. See nursing documentation for volume drained.    Lab Specimen: NA    Complication: none    EBL: 0 mL    Post-op condition: Stable. Albumin was given by protocol if needed.    Post-op plan: Discharge back to the referring physician.    Electronically signed by Maurilio Nichols MD, 01/06/23, 5:52 PM EST.

## 2023-01-06 NOTE — DISCHARGE INSTRUCTIONS
A responsible adult should stay with you and you should rest quietly for the rest of the day.    Do not drink alcohol, drive, operate any heavy machinery or power tools or make any legal/important decisions for the next 24 hours.    Progress your diet as tolerated.  If you begin to experience severe pain, increased shortness of breath, racing heartbeat or a fever above 101 F, seek immediate medical attention.    Follow up with MD as instructed. Call office for results in 3 to 5 days if needed. [phone]    Description of Procedure:  A description of the procedure as well as risks, benefits and alternative methods were explained to the patient who voiced understanding and signed the corresponding consent form. A physical exam was performed and vital signs were monitored throughout the procedure.     A rectal exam was performed which was normal. An Olympus colonoscope was placed into the rectum and proceeded under direct visualization through the colon until the cecum and appendiceal orifice were identified. Careful visualization occurred upon slow withdraw of the scope. The scope was then retroflexed and the distal rectum was visualized. The quality of the prep was good. The procedure was not difficult and there were no immediate complications.  There was no blood loss.     Impression:  1.  1 polyp in the ascending colon that was 2 mm and sessile removed via cold forcep biopsies and sent for histopathology  2.  1 polyp in the ascending colon that was 4 mm and sessile removed via cold snare and sent for histopathology  3.  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  The AVMs were small to medium size and APC was performed for ablation and hemostasis which was achieved.  4.  1 polyp in the rectum that was 4 mm and sessile removed via cold snare and sent for histopathology.   5.  Grade 1 large internal hemorrhoids with no signs of active  bleeding were present.     Recommendations:  1.  It is difficult to tell if the AVMs were the sole source of bleeding or if this from internal hemorrhoids.  Recommend internal hemorrhoid banding in the office and hydrocortisone suppositories  Check CBC right now to ensure hemoglobin greater than 7  No NSAIDs or blood thinners  Repeat colonoscopy in 5 years

## 2023-01-09 LAB
LAB AP CASE REPORT: NORMAL
PATH REPORT.FINAL DX SPEC: NORMAL
PATH REPORT.GROSS SPEC: NORMAL

## 2023-01-10 ENCOUNTER — APPOINTMENT (OUTPATIENT)
Dept: INFUSION THERAPY | Facility: HOSPITAL | Age: 68
End: 2023-01-10
Payer: MEDICARE

## 2023-01-10 ENCOUNTER — HOSPITAL ENCOUNTER (OUTPATIENT)
Dept: INFUSION THERAPY | Facility: HOSPITAL | Age: 68
Discharge: HOME | End: 2023-01-10
Admitting: INTERNAL MEDICINE
Payer: MEDICARE

## 2023-01-10 VITALS
DIASTOLIC BLOOD PRESSURE: 63 MMHG | SYSTOLIC BLOOD PRESSURE: 143 MMHG | RESPIRATION RATE: 16 BRPM | HEART RATE: 87 BPM | OXYGEN SATURATION: 99 %

## 2023-01-10 DIAGNOSIS — D64.9 ANEMIA, UNSPECIFIED TYPE: Primary | ICD-10-CM

## 2023-01-10 LAB
ABO GROUP BLD: NORMAL
BB HOLD TUBE: NORMAL
BLD GP AB SCN SERPL QL: POSITIVE
HCT VFR BLD AUTO: 18.4 % (ref 37.5–51)
HGB BLD-MCNC: 6.1 G/DL (ref 13–17.7)
RH BLD: POSITIVE
T&S EXPIRATION DATE: NORMAL

## 2023-01-10 PROCEDURE — 85014 HEMATOCRIT: CPT

## 2023-01-10 PROCEDURE — 76942 ECHO GUIDE FOR BIOPSY: CPT

## 2023-01-10 PROCEDURE — P9016 RBC LEUKOCYTES REDUCED: HCPCS

## 2023-01-10 PROCEDURE — 36415 COLL VENOUS BLD VENIPUNCTURE: CPT

## 2023-01-10 PROCEDURE — 0 LIDOCAINE 1 % SOLUTION: Performed by: INTERNAL MEDICINE

## 2023-01-10 PROCEDURE — 96365 THER/PROPH/DIAG IV INF INIT: CPT

## 2023-01-10 PROCEDURE — 86922 COMPATIBILITY TEST ANTIGLOB: CPT

## 2023-01-10 PROCEDURE — 36430 TRANSFUSION BLD/BLD COMPNT: CPT

## 2023-01-10 PROCEDURE — 96366 THER/PROPH/DIAG IV INF ADDON: CPT

## 2023-01-10 PROCEDURE — 86900 BLOOD TYPING SEROLOGIC ABO: CPT

## 2023-01-10 PROCEDURE — 85018 HEMOGLOBIN: CPT

## 2023-01-10 PROCEDURE — 25010000002 ALBUMIN HUMAN 25% PER 50 ML: Performed by: INTERNAL MEDICINE

## 2023-01-10 PROCEDURE — 86901 BLOOD TYPING SEROLOGIC RH(D): CPT

## 2023-01-10 PROCEDURE — 86850 RBC ANTIBODY SCREEN: CPT

## 2023-01-10 PROCEDURE — P9047 ALBUMIN (HUMAN), 25%, 50ML: HCPCS | Performed by: INTERNAL MEDICINE

## 2023-01-10 RX ORDER — ALBUMIN (HUMAN) 12.5 G/50ML
12.5 SOLUTION INTRAVENOUS DAILY PRN
Status: DISCONTINUED | OUTPATIENT
Start: 2023-01-10 | End: 2023-01-12 | Stop reason: HOSPADM

## 2023-01-10 RX ORDER — LIDOCAINE HYDROCHLORIDE 20 MG/ML
10 INJECTION, SOLUTION INFILTRATION; PERINEURAL AS NEEDED
Status: CANCELLED | OUTPATIENT
Start: 2023-01-10

## 2023-01-10 RX ORDER — LIDOCAINE HYDROCHLORIDE 20 MG/ML
10 INJECTION, SOLUTION INFILTRATION; PERINEURAL AS NEEDED
Status: DISCONTINUED | OUTPATIENT
Start: 2023-01-10 | End: 2023-01-12 | Stop reason: HOSPADM

## 2023-01-10 RX ORDER — LIDOCAINE HYDROCHLORIDE 10 MG/ML
10 INJECTION, SOLUTION INFILTRATION; PERINEURAL AS NEEDED
Status: CANCELLED | OUTPATIENT
Start: 2023-01-10

## 2023-01-10 RX ORDER — ALBUMIN (HUMAN) 12.5 G/50ML
25 SOLUTION INTRAVENOUS DAILY PRN
Status: DISCONTINUED | OUTPATIENT
Start: 2023-01-10 | End: 2023-01-12 | Stop reason: HOSPADM

## 2023-01-10 RX ORDER — ALBUMIN (HUMAN) 12.5 G/50ML
25 SOLUTION INTRAVENOUS DAILY PRN
Status: CANCELLED | OUTPATIENT
Start: 2023-01-10

## 2023-01-10 RX ORDER — ALBUMIN (HUMAN) 12.5 G/50ML
12.5 SOLUTION INTRAVENOUS DAILY PRN
Status: CANCELLED | OUTPATIENT
Start: 2023-01-10

## 2023-01-10 RX ORDER — SODIUM CHLORIDE 9 MG/ML
250 INJECTION, SOLUTION INTRAVENOUS AS NEEDED
Status: CANCELLED | OUTPATIENT
Start: 2023-01-10

## 2023-01-10 RX ORDER — LIDOCAINE HYDROCHLORIDE 10 MG/ML
10 INJECTION, SOLUTION INFILTRATION; PERINEURAL AS NEEDED
Status: DISCONTINUED | OUTPATIENT
Start: 2023-01-10 | End: 2023-01-12 | Stop reason: HOSPADM

## 2023-01-10 RX ADMIN — ALBUMIN (HUMAN) 12.5 G: 0.25 INJECTION, SOLUTION INTRAVENOUS at 15:15

## 2023-01-10 RX ADMIN — LIDOCAINE HYDROCHLORIDE 10 ML: 10 INJECTION, SOLUTION INFILTRATION; PERINEURAL at 13:15

## 2023-01-10 RX ADMIN — ALBUMIN (HUMAN) 25 G: 0.25 INJECTION, SOLUTION INTRAVENOUS at 14:36

## 2023-01-10 RX ADMIN — ALBUMIN (HUMAN) 25 G: 0.25 INJECTION, SOLUTION INTRAVENOUS at 14:05

## 2023-01-10 NOTE — CODE DOCUMENTATION
Krystin from Dr. Park's office called back and said an order will be sent for patient to receive 2 units of PRBC's and an order for patient to have a CBC drawn with every paracentesis.

## 2023-01-10 NOTE — CODE DOCUMENTATION
Left message with Krystin at Dr. Park's office in regards to patient's hgb being 6.1.  She was going to send Dr. Park a message to let him know.

## 2023-01-10 NOTE — PROCEDURES
Procedure:Ultrasound-guided Paracentesis    Consent: Informed    Pre-op diagnosis: Cirrhosis, Massive ascites    Post-op diagnosis: Cirrhosis, Massive ascites    Anesthesia Provider: .Pranav Cantu MD    Anesthesia type: Local infiltration with 1% Xylocaine    Surgeon: Dayanara Cantu MD    Assistant: none    Procedure summary:  Of Note;  Time out  was done at the bedside with RN and patient  Allergies , labs and medications were reviewed. Patient was made to lie supine and the area of interest was imaged with ultrasound and fluid verified and marked.  Area of interest was cleaned and draped in a sterile fashion. Subsequently local infiltration with Xylocaine. Trocar and cannula were advanced. Upon verification of the fluid, trocar was steadied and cannula advanced further. Trocar was drawn out. Cannula was connected to extension tubing and to the vacuum bottle. Subsequently the cannula was removed after completion of the procedure.     Findings: Clear straw yellow fluid was obtained. See nursing documentation for volume drained.    Lab Specimen: Not applicable    Complication: none    EBL: 0 mL    Post-op condition: Stable. Albumin was given by protocol if needed.    Post-op plan: Discharge back to the referring physician.

## 2023-01-11 ENCOUNTER — HOSPITAL ENCOUNTER (OUTPATIENT)
Dept: INFUSION THERAPY | Facility: HOSPITAL | Age: 68
Setting detail: INFUSION SERIES
Discharge: HOME | End: 2023-01-11
Payer: MEDICARE

## 2023-01-11 VITALS
TEMPERATURE: 98.3 F | RESPIRATION RATE: 16 BRPM | DIASTOLIC BLOOD PRESSURE: 50 MMHG | SYSTOLIC BLOOD PRESSURE: 111 MMHG | HEART RATE: 70 BPM | OXYGEN SATURATION: 99 %

## 2023-01-11 DIAGNOSIS — D64.9 ANEMIA, UNSPECIFIED TYPE: ICD-10-CM

## 2023-01-11 PROCEDURE — 36430 TRANSFUSION BLD/BLD COMPNT: CPT

## 2023-01-11 PROCEDURE — 86900 BLOOD TYPING SEROLOGIC ABO: CPT

## 2023-01-11 PROCEDURE — P9016 RBC LEUKOCYTES REDUCED: HCPCS

## 2023-01-11 PROCEDURE — 85014 HEMATOCRIT: CPT

## 2023-01-11 PROCEDURE — 85018 HEMOGLOBIN: CPT

## 2023-01-11 PROCEDURE — 86922 COMPATIBILITY TEST ANTIGLOB: CPT

## 2023-01-11 PROCEDURE — 86850 RBC ANTIBODY SCREEN: CPT

## 2023-01-11 PROCEDURE — 86901 BLOOD TYPING SEROLOGIC RH(D): CPT

## 2023-01-11 RX ORDER — SODIUM CHLORIDE 9 MG/ML
250 INJECTION, SOLUTION INTRAVENOUS AS NEEDED
Status: DISCONTINUED | OUTPATIENT
Start: 2023-01-11 | End: 2023-01-13 | Stop reason: HOSPADM

## 2023-01-12 DIAGNOSIS — D64.9 ANEMIA, UNSPECIFIED TYPE: Primary | ICD-10-CM

## 2023-01-12 LAB
BH BB BLOOD EXPIRATION DATE: NORMAL
BH BB BLOOD EXPIRATION DATE: NORMAL
BH BB BLOOD TYPE BARCODE: 6200
BH BB BLOOD TYPE BARCODE: 6200
BH BB DISPENSE STATUS: NORMAL
BH BB DISPENSE STATUS: NORMAL
BH BB PRODUCT CODE: NORMAL
BH BB PRODUCT CODE: NORMAL
BH BB UNIT NUMBER: NORMAL
BH BB UNIT NUMBER: NORMAL
BLD GP AB SCN SERPL QL: NEGATIVE
CROSSMATCH INTERPRETATION: NORMAL
CROSSMATCH INTERPRETATION: NORMAL
UNIT  ABO: NORMAL
UNIT  ABO: NORMAL
UNIT  RH: NORMAL
UNIT  RH: NORMAL

## 2023-01-12 RX ORDER — ALBUMIN (HUMAN) 12.5 G/50ML
25 SOLUTION INTRAVENOUS DAILY PRN
Status: CANCELLED | OUTPATIENT
Start: 2023-01-16

## 2023-01-12 RX ORDER — ALBUMIN (HUMAN) 12.5 G/50ML
12.5 SOLUTION INTRAVENOUS DAILY PRN
Status: CANCELLED | OUTPATIENT
Start: 2023-01-16

## 2023-01-12 RX ORDER — LIDOCAINE HYDROCHLORIDE 20 MG/ML
10 INJECTION, SOLUTION INFILTRATION; PERINEURAL AS NEEDED
Status: CANCELLED | OUTPATIENT
Start: 2023-01-16

## 2023-01-12 RX ORDER — LIDOCAINE HYDROCHLORIDE 10 MG/ML
10 INJECTION, SOLUTION INFILTRATION; PERINEURAL AS NEEDED
Status: CANCELLED | OUTPATIENT
Start: 2023-01-16

## 2023-01-16 ENCOUNTER — HOSPITAL ENCOUNTER (OUTPATIENT)
Dept: INFUSION THERAPY | Facility: HOSPITAL | Age: 68
Discharge: HOME | End: 2023-01-16
Admitting: INTERNAL MEDICINE
Payer: MEDICARE

## 2023-01-16 VITALS
TEMPERATURE: 97 F | DIASTOLIC BLOOD PRESSURE: 62 MMHG | SYSTOLIC BLOOD PRESSURE: 111 MMHG | OXYGEN SATURATION: 100 % | HEART RATE: 91 BPM | RESPIRATION RATE: 16 BRPM

## 2023-01-16 DIAGNOSIS — K74.60 LIVER CIRRHOSIS SECONDARY TO NASH: ICD-10-CM

## 2023-01-16 DIAGNOSIS — K75.81 LIVER CIRRHOSIS SECONDARY TO NASH: ICD-10-CM

## 2023-01-16 PROCEDURE — 96365 THER/PROPH/DIAG IV INF INIT: CPT

## 2023-01-16 PROCEDURE — P9047 ALBUMIN (HUMAN), 25%, 50ML: HCPCS | Performed by: INTERNAL MEDICINE

## 2023-01-16 PROCEDURE — 25010000002 ALBUMIN HUMAN 25% PER 50 ML: Performed by: INTERNAL MEDICINE

## 2023-01-16 PROCEDURE — 96366 THER/PROPH/DIAG IV INF ADDON: CPT

## 2023-01-16 PROCEDURE — 76942 ECHO GUIDE FOR BIOPSY: CPT

## 2023-01-16 RX ORDER — LIDOCAINE HYDROCHLORIDE 20 MG/ML
10 INJECTION, SOLUTION INFILTRATION; PERINEURAL AS NEEDED
Status: DISCONTINUED | OUTPATIENT
Start: 2023-01-16 | End: 2023-01-18 | Stop reason: HOSPADM

## 2023-01-16 RX ORDER — ALBUMIN (HUMAN) 12.5 G/50ML
12.5 SOLUTION INTRAVENOUS DAILY PRN
Status: DISCONTINUED | OUTPATIENT
Start: 2023-01-16 | End: 2023-01-18 | Stop reason: HOSPADM

## 2023-01-16 RX ORDER — ALBUMIN (HUMAN) 12.5 G/50ML
25 SOLUTION INTRAVENOUS DAILY PRN
Status: DISCONTINUED | OUTPATIENT
Start: 2023-01-16 | End: 2023-01-18 | Stop reason: HOSPADM

## 2023-01-16 RX ORDER — LIDOCAINE HYDROCHLORIDE 10 MG/ML
10 INJECTION, SOLUTION INFILTRATION; PERINEURAL AS NEEDED
Status: DISCONTINUED | OUTPATIENT
Start: 2023-01-16 | End: 2023-01-18 | Stop reason: HOSPADM

## 2023-01-16 RX ADMIN — ALBUMIN (HUMAN) 25 G: 0.25 INJECTION, SOLUTION INTRAVENOUS at 14:30

## 2023-01-16 RX ADMIN — LIDOCAINE HYDROCHLORIDE 10 ML: 10 INJECTION, SOLUTION EPIDURAL; INFILTRATION; INTRACAUDAL; PERINEURAL at 13:59

## 2023-01-16 RX ADMIN — ALBUMIN (HUMAN) 25 G: 0.25 INJECTION, SOLUTION INTRAVENOUS at 14:10

## 2023-01-16 NOTE — PROGRESS NOTES
PARACENTESIS    Time Arrived in Department: 1230      Consent: yes  Allergies have been Reviewed: yes      ID Band Verified: yes    Method: Wheelchair    Lab Results: Checked    Physician: Simone      Nurse: Samreen Holm RN    IR Care Plan: Person Educated - Patient, Current Knowledge - Understands, Learning Barrier - None, Readiness to Learn - Acceptance, Teaching Topic - Procedure and Evaluation of Teaching - Verbalized Understanding      Neurological: Within Normal Limits    Skin: Flesh, Warm and Dry    Respiratory Status: Respirations even and unlabored    Room In: 9      Procedure: Paracentesis    Time Out Completed: yes    Time Out: 1355    Prep Time: 1357    Prep Site 1: Right Lateral Abdomen      Prep: Chlorhexidine and Sterile drape    Procedure Position: Right Side    Guidance: Ultrasound    Fluid Quality: Clear and Yellow    Procedure Note: Pt prepped and draped in a sterile fashion. 1% lidocaine for local anesthesia. Pt tolerated procedure well.     Post-Procedure Position: Supine and HOB Elevated    Dressing Site 1: 2x2 and Tegaderm    Post Procedure Status:  Alert and Oriented, returned to baseline, Dressing Dry/ Intact, IV documented (see flowsheet), Stable Condition and Dressing properly labeled    Specimen To Lab: no    Total Fluid Removed: 7.5 liters

## 2023-01-18 NOTE — PROCEDURES
Procedure:Ultrasound-guided Paracentesis    Consent: Informed    Pre-op diagnosis: Cirrhosis, Massive ascites    Post-op diagnosis: Cirrhosis, Massive ascites    Anesthesia Provider: .Maurilio Nichols MD    Anesthesia type: Local infiltration with 1% Xylocaine    Surgeon: .Maurilio Nichols MD    Assistant: none    Procedure summary:  Of Note;  Time out  was done at the bedside with RN and patient  Allergies , labs and medications were reviewed. Patient was made to lie supine and the area of interest was imaged with ultrasound and fluid verified and marked.  Area of interest was cleaned and draped in a sterile fashion. Subsequently local infiltration with Xylocaine. Trocar and cannula were advanced. Upon verification of the fluid, trocar was steadied and cannula advanced further. Trocar was drawn out. Cannula was connected to extension tubing and to the vacuum bottle. Subsequently the cannula was removed after completion of the procedure.     Findings: Clear straw yellow fluid was obtained. See nursing documentation for volume drained.    Lab Specimen: NA    Complication: none    EBL: 0 mL    Post-op condition: Stable. Albumin was given by protocol if needed.    Post-op plan: Discharge back to the referring physician.    Electronically signed by Maurilio Nichols MD, 01/17/23, 9:46 PM EST.

## 2023-01-19 ENCOUNTER — OFFICE (AMBULATORY)
Dept: URBAN - METROPOLITAN AREA CLINIC 64 | Facility: CLINIC | Age: 68
End: 2023-01-19

## 2023-01-19 VITALS — HEIGHT: 74 IN

## 2023-01-19 DIAGNOSIS — K64.8 OTHER HEMORRHOIDS: ICD-10-CM

## 2023-01-19 PROCEDURE — 46221 LIGATION OF HEMORRHOID(S): CPT | Performed by: INTERNAL MEDICINE

## 2023-01-20 ENCOUNTER — HOSPITAL ENCOUNTER (OUTPATIENT)
Facility: HOSPITAL | Age: 68
Discharge: HOME | End: 2023-01-23
Attending: EMERGENCY MEDICINE | Admitting: INTERNAL MEDICINE
Payer: MEDICARE

## 2023-01-20 DIAGNOSIS — D64.9 ANEMIA, UNSPECIFIED TYPE: Primary | ICD-10-CM

## 2023-01-20 DIAGNOSIS — E87.1 HYPONATREMIA: ICD-10-CM

## 2023-01-20 DIAGNOSIS — K92.2 GASTROINTESTINAL HEMORRHAGE, UNSPECIFIED GASTROINTESTINAL HEMORRHAGE TYPE: ICD-10-CM

## 2023-01-20 DIAGNOSIS — N28.9 ACUTE RENAL INSUFFICIENCY: ICD-10-CM

## 2023-01-20 DIAGNOSIS — R73.9 HYPERGLYCEMIA: ICD-10-CM

## 2023-01-20 DIAGNOSIS — K62.5 RECTAL BLEEDING: ICD-10-CM

## 2023-01-20 LAB
ACETONE BLD QL: NEGATIVE
ALBUMIN SERPL-MCNC: 3.8 G/DL (ref 3.5–5.2)
ALBUMIN/GLOB SERPL: 1.3 G/DL
ALP SERPL-CCNC: 182 U/L (ref 39–117)
ALT SERPL W P-5'-P-CCNC: 10 U/L (ref 1–41)
ANION GAP SERPL CALCULATED.3IONS-SCNC: 10 MMOL/L (ref 5–15)
AST SERPL-CCNC: 15 U/L (ref 1–40)
BASOPHILS # BLD AUTO: 0 10*3/MM3 (ref 0–0.2)
BASOPHILS NFR BLD AUTO: 0.8 % (ref 0–1.5)
BB HOLD TUBE: NORMAL
BILIRUB SERPL-MCNC: 3.5 MG/DL (ref 0–1.2)
BLD GP AB SCN SERPL QL: NEGATIVE
BUN SERPL-MCNC: 47 MG/DL (ref 8–23)
BUN/CREAT SERPL: 19.3 (ref 7–25)
CALCIUM SPEC-SCNC: 8.9 MG/DL (ref 8.6–10.5)
CHLORIDE SERPL-SCNC: 94 MMOL/L (ref 98–107)
CO2 SERPL-SCNC: 19 MMOL/L (ref 22–29)
CREAT SERPL-MCNC: 2.44 MG/DL (ref 0.76–1.27)
DEPRECATED RDW RBC AUTO: 65.6 FL (ref 37–54)
EGFRCR SERPLBLD CKD-EPI 2021: 28.3 ML/MIN/1.73
EOSINOPHIL # BLD AUTO: 0 10*3/MM3 (ref 0–0.4)
EOSINOPHIL NFR BLD AUTO: 0.7 % (ref 0.3–6.2)
ERYTHROCYTE [DISTWIDTH] IN BLOOD BY AUTOMATED COUNT: 18.2 % (ref 12.3–15.4)
FERRITIN SERPL-MCNC: 1087 NG/ML (ref 30–400)
GLOBULIN UR ELPH-MCNC: 2.9 GM/DL
GLUCOSE BLDC GLUCOMTR-MCNC: 429 MG/DL (ref 70–105)
GLUCOSE BLDC GLUCOMTR-MCNC: 437 MG/DL (ref 70–105)
GLUCOSE SERPL-MCNC: 686 MG/DL (ref 65–99)
HCT VFR BLD AUTO: 21 % (ref 37.5–51)
HGB BLD-MCNC: 6.6 G/DL (ref 13–17.7)
INR PPP: 1.11 (ref 0.93–1.1)
IRON 24H UR-MRATE: 127 MCG/DL (ref 59–158)
IRON SATN MFR SERPL: 54 % (ref 20–50)
LYMPHOCYTES # BLD AUTO: 0.3 10*3/MM3 (ref 0.7–3.1)
LYMPHOCYTES NFR BLD AUTO: 12 % (ref 19.6–45.3)
MAGNESIUM SERPL-MCNC: 1.9 MG/DL (ref 1.6–2.4)
MCH RBC QN AUTO: 32.2 PG (ref 26.6–33)
MCHC RBC AUTO-ENTMCNC: 31.4 G/DL (ref 31.5–35.7)
MCV RBC AUTO: 102.3 FL (ref 79–97)
MONOCYTES # BLD AUTO: 0.1 10*3/MM3 (ref 0.1–0.9)
MONOCYTES NFR BLD AUTO: 6 % (ref 5–12)
NEUTROPHILS NFR BLD AUTO: 2 10*3/MM3 (ref 1.7–7)
NEUTROPHILS NFR BLD AUTO: 80.5 % (ref 42.7–76)
NRBC BLD AUTO-RTO: 0.4 /100 WBC (ref 0–0.2)
PHOSPHATE SERPL-MCNC: 3.9 MG/DL (ref 2.5–4.5)
PLATELET # BLD AUTO: 98 10*3/MM3 (ref 140–450)
PMV BLD AUTO: 8.1 FL (ref 6–12)
POTASSIUM SERPL-SCNC: 4.9 MMOL/L (ref 3.5–5.2)
PRESERVATIVE ANTIBODY: NORMAL
PROT SERPL-MCNC: 6.7 G/DL (ref 6–8.5)
PROTHROMBIN TIME: 11.4 SECONDS (ref 9.6–11.7)
RBC # BLD AUTO: 2.05 10*6/MM3 (ref 4.14–5.8)
SODIUM SERPL-SCNC: 123 MMOL/L (ref 136–145)
TIBC SERPL-MCNC: 234 MCG/DL (ref 298–536)
TRANSFERRIN SERPL-MCNC: 157 MG/DL (ref 200–360)
WBC NRBC COR # BLD: 2.4 10*3/MM3 (ref 3.4–10.8)

## 2023-01-20 PROCEDURE — 63710000001 INSULIN REGULAR HUMAN PER 5 UNITS: Performed by: PHYSICIAN ASSISTANT

## 2023-01-20 PROCEDURE — 86850 RBC ANTIBODY SCREEN: CPT | Performed by: NURSE PRACTITIONER

## 2023-01-20 PROCEDURE — 82962 GLUCOSE BLOOD TEST: CPT

## 2023-01-20 PROCEDURE — 82728 ASSAY OF FERRITIN: CPT | Performed by: STUDENT IN AN ORGANIZED HEALTH CARE EDUCATION/TRAINING PROGRAM

## 2023-01-20 PROCEDURE — 63710000001 INSULIN LISPRO (HUMAN) PER 5 UNITS: Performed by: STUDENT IN AN ORGANIZED HEALTH CARE EDUCATION/TRAINING PROGRAM

## 2023-01-20 PROCEDURE — 96374 THER/PROPH/DIAG INJ IV PUSH: CPT

## 2023-01-20 PROCEDURE — 96361 HYDRATE IV INFUSION ADD-ON: CPT

## 2023-01-20 PROCEDURE — 36430 TRANSFUSION BLD/BLD COMPNT: CPT

## 2023-01-20 PROCEDURE — G0378 HOSPITAL OBSERVATION PER HR: HCPCS

## 2023-01-20 PROCEDURE — 83735 ASSAY OF MAGNESIUM: CPT | Performed by: PHYSICIAN ASSISTANT

## 2023-01-20 PROCEDURE — 86870 RBC ANTIBODY IDENTIFICATION: CPT | Performed by: NURSE PRACTITIONER

## 2023-01-20 PROCEDURE — 86922 COMPATIBILITY TEST ANTIGLOB: CPT

## 2023-01-20 PROCEDURE — 84466 ASSAY OF TRANSFERRIN: CPT | Performed by: STUDENT IN AN ORGANIZED HEALTH CARE EDUCATION/TRAINING PROGRAM

## 2023-01-20 PROCEDURE — 86900 BLOOD TYPING SEROLOGIC ABO: CPT | Performed by: NURSE PRACTITIONER

## 2023-01-20 PROCEDURE — P9016 RBC LEUKOCYTES REDUCED: HCPCS

## 2023-01-20 PROCEDURE — 82803 BLOOD GASES ANY COMBINATION: CPT

## 2023-01-20 PROCEDURE — 82009 KETONE BODYS QUAL: CPT | Performed by: PHYSICIAN ASSISTANT

## 2023-01-20 PROCEDURE — 80053 COMPREHEN METABOLIC PANEL: CPT | Performed by: NURSE PRACTITIONER

## 2023-01-20 PROCEDURE — 86901 BLOOD TYPING SEROLOGIC RH(D): CPT | Performed by: NURSE PRACTITIONER

## 2023-01-20 PROCEDURE — 85025 COMPLETE CBC W/AUTO DIFF WBC: CPT | Performed by: NURSE PRACTITIONER

## 2023-01-20 PROCEDURE — 36415 COLL VENOUS BLD VENIPUNCTURE: CPT | Performed by: NURSE PRACTITIONER

## 2023-01-20 PROCEDURE — 86900 BLOOD TYPING SEROLOGIC ABO: CPT

## 2023-01-20 PROCEDURE — 84100 ASSAY OF PHOSPHORUS: CPT | Performed by: PHYSICIAN ASSISTANT

## 2023-01-20 PROCEDURE — 99285 EMERGENCY DEPT VISIT HI MDM: CPT

## 2023-01-20 PROCEDURE — 85610 PROTHROMBIN TIME: CPT | Performed by: NURSE PRACTITIONER

## 2023-01-20 PROCEDURE — 83540 ASSAY OF IRON: CPT | Performed by: STUDENT IN AN ORGANIZED HEALTH CARE EDUCATION/TRAINING PROGRAM

## 2023-01-20 PROCEDURE — 63710000001 INSULIN GLARGINE PER 5 UNITS: Performed by: STUDENT IN AN ORGANIZED HEALTH CARE EDUCATION/TRAINING PROGRAM

## 2023-01-20 RX ORDER — NICOTINE POLACRILEX 4 MG
15 LOZENGE BUCCAL
Status: DISCONTINUED | OUTPATIENT
Start: 2023-01-20 | End: 2023-01-23 | Stop reason: HOSPADM

## 2023-01-20 RX ORDER — MONTELUKAST SODIUM 10 MG/1
10 TABLET ORAL NIGHTLY
Status: DISCONTINUED | OUTPATIENT
Start: 2023-01-20 | End: 2023-01-23 | Stop reason: HOSPADM

## 2023-01-20 RX ORDER — DEXTROSE MONOHYDRATE 25 G/50ML
25 INJECTION, SOLUTION INTRAVENOUS
Status: DISCONTINUED | OUTPATIENT
Start: 2023-01-20 | End: 2023-01-23 | Stop reason: HOSPADM

## 2023-01-20 RX ORDER — SODIUM CHLORIDE 9 MG/ML
75 INJECTION, SOLUTION INTRAVENOUS CONTINUOUS
Status: DISCONTINUED | OUTPATIENT
Start: 2023-01-20 | End: 2023-01-22

## 2023-01-20 RX ORDER — NITROGLYCERIN 0.4 MG/1
0.4 TABLET SUBLINGUAL
Status: DISCONTINUED | OUTPATIENT
Start: 2023-01-20 | End: 2023-01-23 | Stop reason: HOSPADM

## 2023-01-20 RX ORDER — ONDANSETRON 4 MG/1
4 TABLET, FILM COATED ORAL EVERY 6 HOURS PRN
Status: DISCONTINUED | OUTPATIENT
Start: 2023-01-20 | End: 2023-01-23 | Stop reason: HOSPADM

## 2023-01-20 RX ORDER — OLANZAPINE 10 MG/2ML
1 INJECTION, POWDER, LYOPHILIZED, FOR SOLUTION INTRAMUSCULAR
Status: DISCONTINUED | OUTPATIENT
Start: 2023-01-20 | End: 2023-01-23 | Stop reason: HOSPADM

## 2023-01-20 RX ORDER — SODIUM CHLORIDE 0.9 % (FLUSH) 0.9 %
10 SYRINGE (ML) INJECTION AS NEEDED
Status: DISCONTINUED | OUTPATIENT
Start: 2023-01-20 | End: 2023-01-23 | Stop reason: HOSPADM

## 2023-01-20 RX ORDER — SODIUM CHLORIDE 9 MG/ML
40 INJECTION, SOLUTION INTRAVENOUS AS NEEDED
Status: DISCONTINUED | OUTPATIENT
Start: 2023-01-20 | End: 2023-01-23 | Stop reason: HOSPADM

## 2023-01-20 RX ORDER — INSULIN LISPRO 100 [IU]/ML
3-14 INJECTION, SOLUTION INTRAVENOUS; SUBCUTANEOUS EVERY 6 HOURS SCHEDULED
Status: DISCONTINUED | OUTPATIENT
Start: 2023-01-20 | End: 2023-01-21

## 2023-01-20 RX ORDER — SERTRALINE HYDROCHLORIDE 100 MG/1
100 TABLET, FILM COATED ORAL EVERY EVENING
Status: DISCONTINUED | OUTPATIENT
Start: 2023-01-20 | End: 2023-01-23 | Stop reason: HOSPADM

## 2023-01-20 RX ORDER — ONDANSETRON 2 MG/ML
4 INJECTION INTRAMUSCULAR; INTRAVENOUS EVERY 6 HOURS PRN
Status: DISCONTINUED | OUTPATIENT
Start: 2023-01-20 | End: 2023-01-23 | Stop reason: HOSPADM

## 2023-01-20 RX ORDER — PANTOPRAZOLE SODIUM 40 MG/10ML
40 INJECTION, POWDER, LYOPHILIZED, FOR SOLUTION INTRAVENOUS 2 TIMES DAILY
Status: DISCONTINUED | OUTPATIENT
Start: 2023-01-20 | End: 2023-01-23 | Stop reason: HOSPADM

## 2023-01-20 RX ORDER — ROSUVASTATIN CALCIUM 10 MG/1
10 TABLET, COATED ORAL NIGHTLY
Status: DISCONTINUED | OUTPATIENT
Start: 2023-01-20 | End: 2023-01-23 | Stop reason: HOSPADM

## 2023-01-20 RX ORDER — SODIUM CHLORIDE 0.9 % (FLUSH) 0.9 %
10 SYRINGE (ML) INJECTION EVERY 12 HOURS SCHEDULED
Status: DISCONTINUED | OUTPATIENT
Start: 2023-01-20 | End: 2023-01-23 | Stop reason: HOSPADM

## 2023-01-20 RX ADMIN — Medication 10 ML: at 23:07

## 2023-01-20 RX ADMIN — INSULIN HUMAN 8 UNITS: 100 INJECTION, SOLUTION PARENTERAL at 17:41

## 2023-01-20 RX ADMIN — PANTOPRAZOLE SODIUM 40 MG: 40 INJECTION, POWDER, FOR SOLUTION INTRAVENOUS at 23:07

## 2023-01-20 RX ADMIN — INSULIN GLARGINE 15 UNITS: 100 INJECTION, SOLUTION SUBCUTANEOUS at 23:06

## 2023-01-20 RX ADMIN — ROSUVASTATIN 10 MG: 10 TABLET, FILM COATED ORAL at 23:07

## 2023-01-20 RX ADMIN — MONTELUKAST 10 MG: 10 TABLET, FILM COATED ORAL at 23:07

## 2023-01-20 RX ADMIN — SODIUM CHLORIDE 75 ML/HR: 9 INJECTION, SOLUTION INTRAVENOUS at 23:14

## 2023-01-20 RX ADMIN — SERTRALINE 100 MG: 100 TABLET, FILM COATED ORAL at 23:07

## 2023-01-20 RX ADMIN — INSULIN LISPRO 14 UNITS: 100 INJECTION, SOLUTION INTRAVENOUS; SUBCUTANEOUS at 23:06

## 2023-01-20 NOTE — Clinical Note
Level of Care: Telemetry [5]   Admitting Physician: LELE DOWELL [[id number]]   Attending Physician: LELE DOWELL [[id number]]

## 2023-01-20 NOTE — ED NOTES
Pt reports having his blood drawn at Dr. Park yesterday and told to come in today due too low hgb. Pt reports dark red blood in stools for 2-3 months.

## 2023-01-20 NOTE — ED PROVIDER NOTES
Subjective      Provider in Triage Note  Patient is a 67-year-old male who states he went to see Dr. Park the gastroenterologist yesterday and had blood drawn and he was called today and told that his hemoglobin was 5.6 and needed to come directly to the emergency room.  He drove himself here but needed help out of the car.  He denies any pain he states he is weak.    Denies any active bleeding he states they do not know where he is bleeding from.      History of Present Illness  Chief Complaint:    Patient is a 67-year-old  male with a past medical history of Cyr/cirrhosis, anemia, colon polyps, kidney cancer, diabetes, esophageal varices who presents today due to abnormal blood work.  Patient was called by his GI doctor and told to go to the ER due to low hemoglobin levels.  Patient states he has had dark stools for 3 to 4 months.  He also states he had a recent internal hemorrhoid banding of one of his 3 internal hemorrhoids.  He reports he is also had a recent colonoscopy where he reports he had some cauterization per his GI doctor for GI bleeding.  Patient currently denies any abdominal pain or nausea.  He reports 1 episode of vomiting yesterday.  No coffee-ground emesis.  Patient states that he does receive paracentesis once per week, last received 4 days ago due to his cirrhosis.  Patient states he has appointment with his liver transplant surgeon next week.  Patient reports he has constant fatigue and shortness of breath but no new chest pain shortness of breath cough or fever or chills.  Patient states he did have 1 episode of emesis after the banding procedure yesterday, but otherwise has not had nausea, vomiting, diarrhea, melena, dysuria, hematuria.     PCP: Froylan Son  GI: Xavier    History provided by:  Patient      Review of Systems   Constitutional: Negative for chills and fever.   HENT: Negative for sore throat and trouble swallowing.    Eyes: Negative.    Respiratory: Negative for  "shortness of breath and wheezing.    Cardiovascular: Negative for chest pain.   Gastrointestinal: Positive for abdominal pain, blood in stool, nausea and vomiting.   Endocrine: Negative.    Genitourinary: Negative for dysuria.   Musculoskeletal: Negative for back pain and myalgias.   Skin: Negative for rash.   Allergic/Immunologic: Negative.    Neurological: Negative for light-headedness and headaches.   Psychiatric/Behavioral: Negative for behavioral problems.   All other systems reviewed and are negative.      Past Medical History:   Diagnosis Date   • Anemia 09/2011   • Anesthesia complication     AWAKENED EARLY, ASPIRATION PNEUMONIA   • Cancer (HCC)     KIDNEY   • Cirrhosis (HCC)    • Colon polyp    • Diabetes mellitus (HCC)    • Diverticulitis of colon    • Esophageal varices (HCC)    • Fatty liver    • Gallstones    • Hyperlipidemia    • Hypertension    • Liver disease        No Known Allergies    Past Surgical History:   Procedure Laterality Date   • COLONOSCOPY  approx 2018    benign polyps per pt    • COLONOSCOPY N/A 1/6/2023    Procedure: COLONOSCOPY with argon plasma coagulation and polypectomy x 3;  Surgeon: Kevin Park MD;  Location: [facility] ENDOSCOPY;  Service: Gastroenterology;  Laterality: N/A;  post op: polyps, internal hemorrhoids, AVM   • ENDOSCOPY N/A 02/28/2022    Procedure: ESOPHAGOGASTRODUODENOSCOPY;  Surgeon: David Almeida MD;  Location: [facility] ENDOSCOPY;  Service: Gastroenterology;  Laterality: N/A;  pre - ruq pain, hx parikh, cirrhosis  post - hiatal hernia   • ENDOSCOPY N/A 12/27/2022    Procedure: ESOPHAGOGASTRODUODENOSCOPY;  Surgeon: TANVIR Calixto MD;  Location: [facility] ENDOSCOPY;  Service: Gastroenterology;  Laterality: N/A;  post: portal hypertensive gastropathy, gastric polyps   • LIVER BIOPSY     • NEPHRECTOMY Right     partial    • SHOULDER SURGERY      right shoulder \"reverse\" per pt    • UPPER GASTROINTESTINAL ENDOSCOPY  approx 2018    negative per pt        Family " History   Problem Relation Age of Onset   • Cirrhosis Mother    • Other Father         Unknown   • Liver disease Sister    • Malig Hyperthermia Neg Hx        Social History     Socioeconomic History   • Marital status: Single   Tobacco Use   • Smoking status: Former     Packs/day: 1.00     Years: 15.00     Pack years: 15.00     Types: Cigarettes     Start date: 1971     Quit date: 1984     Years since quittin.7   • Smokeless tobacco: Never   • Tobacco comments:     quit 20 plus years ago    Vaping Use   • Vaping Use: Never used   Substance and Sexual Activity   • Alcohol use: Never   • Drug use: Not Currently     Types: Marijuana     Comment: Abstinent for since early adulthood   • Sexual activity: Defer     Partners: Female           Objective   Physical Exam  Vitals and nursing note reviewed.   Constitutional:       General: He is not in acute distress.     Appearance: Normal appearance. He is normal weight. He is not diaphoretic.   HENT:      Head: Normocephalic and atraumatic.      Right Ear: Tympanic membrane normal.      Left Ear: Tympanic membrane normal.      Nose: Nose normal. No congestion.      Mouth/Throat:      Mouth: Mucous membranes are moist.   Eyes:      Extraocular Movements: Extraocular movements intact.      Conjunctiva/sclera: Conjunctivae normal.      Pupils: Pupils are equal, round, and reactive to light.   Cardiovascular:      Rate and Rhythm: Normal rate and regular rhythm.      Pulses: Normal pulses.      Heart sounds: Normal heart sounds. No murmur heard.  Pulmonary:      Effort: Pulmonary effort is normal.      Breath sounds: Normal breath sounds. No wheezing.   Abdominal:      General: Abdomen is flat.      Tenderness: There is no abdominal tenderness. There is no right CVA tenderness.   Genitourinary:     Rectum: Guaiac result positive.   Musculoskeletal:         General: Normal range of motion.      Cervical back: Normal range of motion.   Skin:     General: Skin is warm.  "     Capillary Refill: Capillary refill takes less than 2 seconds.      Coloration: Skin is jaundiced.      Comments: Mild jaundice   Neurological:      General: No focal deficit present.      Mental Status: He is alert and oriented to person, place, and time.   Psychiatric:         Mood and Affect: Mood normal.         Behavior: Behavior normal.         Procedures           ED Course  ED Course as of 01/20/23 2024 Fri Jan 20, 2023   1832 Sodium(!): 123  Corrected sodium approximately 132 due to hyperglycemia []   1940 Spoke with blood bank, they state that they are currently working on patient's blood, he had abnormal antibody that required additional testing []   2014 Spoke with Dr. Segovia who agreed accept patient for admission. []      ED Course User Index  [] Dianne Valdes PA    /41   Pulse 85   Temp 97.5 °F (36.4 °C) (Oral)   Resp 22   Ht 188 cm (74\")   Wt 98 kg (216 lb)   SpO2 100%   BMI 27.73 kg/m²   Labs Reviewed   COMPREHENSIVE METABOLIC PANEL - Abnormal; Notable for the following components:       Result Value    Glucose 686 (*)     BUN 47 (*)     Creatinine 2.44 (*)     Sodium 123 (*)     Chloride 94 (*)     CO2 19.0 (*)     Alkaline Phosphatase 182 (*)     Total Bilirubin 3.5 (*)     eGFR 28.3 (*)     All other components within normal limits    Narrative:     GFR Normal >60  Chronic Kidney Disease <60  Kidney Failure <15     PROTIME-INR - Abnormal; Notable for the following components:    INR 1.11 (*)     All other components within normal limits   CBC WITH AUTO DIFFERENTIAL - Abnormal; Notable for the following components:    WBC 2.40 (*)     RBC 2.05 (*)     Hemoglobin 6.6 (*)     Hematocrit 21.0 (*)     .3 (*)     MCHC 31.4 (*)     RDW 18.2 (*)     RDW-SD 65.6 (*)     Platelets 98 (*)     Neutrophil % 80.5 (*)     Lymphocyte % 12.0 (*)     Lymphocytes, Absolute 0.30 (*)     nRBC 0.4 (*)     All other components within normal limits   POCT GLUCOSE FINGERSTICK - " Abnormal; Notable for the following components:    Glucose 429 (*)     All other components within normal limits   MAGNESIUM - Normal   PHOSPHORUS - Normal   ACETONE - Normal   BLOOD GAS, VENOUS   URINALYSIS W/ MICROSCOPIC IF INDICATED (NO CULTURE)   BLOOD GAS, VENOUS   POCT GLUCOSE FINGERSTICK   TYPE AND SCREEN   BB ARMBAND CHECK   ANTIBODY SCREEN   PREPARE RBC   BLOOD BANK HOLD TUBE   CBC AND DIFFERENTIAL    Narrative:     The following orders were created for panel order CBC & Differential.  Procedure                               Abnormality         Status                     ---------                               -----------         ------                     CBC Auto Differential[[id number]]        Abnormal            Final result                 Please view results for these tests on the individual orders.   KETONE BODIES SERUM    Narrative:     The following orders were created for panel order Ketone Bodies, Serum (Not performed at [facility]).  Procedure                               Abnormality         Status                     ---------                               -----------         ------                     Acetone[[id number]]                      Normal              Final result                 Please view results for these tests on the individual orders.     Medications   sodium chloride 0.9 % flush 10 mL (has no administration in time range)   insulin regular (humuLIN R,novoLIN R) injection 8 Units (8 Units Intravenous Given 1/20/23 1741)     No radiology results for the last day                                         Medical Decision Making  MEDICAL DECISION  Epic Chart Review: Patient seen by Dr. Park for colonoscopy for ascites, rectal bleeding, cirrhosis, iron deficiency anemia.  On colonoscopy patient did have 1 polyp in the ascending colon that was 2 mm that was removed, numerous AVMs in the ascending colon with no active bleeding but showed signs of recent bleeding that were present.  Grade  1 large internal hemorrhoids with no signs of active bleeding.  Patient has required previous blood transfusions.  Comorbidities: Iron deficiency anemia, history kidney cancer, cirrhosis, diabetes, Cyr  Differentials: Rectal bleeding, GI bleed electrolyte abnormality, glycemia, DKA; this list is not all inclusive and does not constitute the entirety of considered causes  Radiology interpretation:  Images reviewed by me and interpreted by radiologist, not warranted  Lab interpretation:  Labs viewed by me significant for, as above    While in the ED IV was placed and labs were obtained appropriate PPE was worn during exam and throughout all encounters with the patient.  Patient had the above evaluation.  IV established, labs obtained.  Patient hemoglobin 6.6.  Rectal Hemoccult positive, no melena.  Type and screen and 1 unit packed red blood cells was ordered.  CMP moderate hyperglycemia 686 BUN 47, creatinine 2.44, slightly elevated compared to baseline.  Sodium 123.  Suspect pseudohyponatremia secondary to hyperglycemia.  Corrected sodium would be 132.  Serum ketones negative.  pH on VBG within normal limits.  Patient received 8 units Humulin with improvement of his glucose to 429.  Patient given 1 unit packed red blood cells in the ER as well.  Patient remained hemodynamically stable.  Patient to be admitted to hospitalist for further evaluation and treatment for anemia, GI bleed, hyperglycemia.  Discussed initiation of Glucomander with hospitalist attending, Dr. Segovia, requested this not be initiated at this time.  Patient had otherwise unremarkable ER course and was agreeable with plan of admission.    Acute renal insufficiency: acute illness or injury  Anemia, unspecified type: chronic illness or injury with exacerbation, progression, or side effects of treatment  Gastrointestinal hemorrhage, unspecified gastrointestinal hemorrhage type: complicated acute illness or injury  Hyperglycemia: acute illness or  injury  Hyponatremia: acute illness or injury  Amount and/or Complexity of Data Reviewed  Labs: ordered. Decision-making details documented in ED Course.      Risk  OTC drugs.  Decision regarding hospitalization.          Final diagnoses:   Anemia, unspecified type   Gastrointestinal hemorrhage, unspecified gastrointestinal hemorrhage type   Hyperglycemia   Hyponatremia   Acute renal insufficiency       ED Disposition  ED Disposition     ED Disposition   Decision to Admit    Condition   --    Comment   Level of Care: Telemetry [5]   Admitting Physician: LELE DOWELL [[id number]]   Attending Physician: LELE DOWELL [[id number]]               No follow-up provider specified.       Medication List      No changes were made to your prescriptions during this visit.          Dianne Valdes PA  01/20/23 2025

## 2023-01-21 ENCOUNTER — ANESTHESIA (OUTPATIENT)
Dept: GASTROENTEROLOGY | Facility: HOSPITAL | Age: 68
End: 2023-01-21
Payer: MEDICARE

## 2023-01-21 ENCOUNTER — ANESTHESIA EVENT (OUTPATIENT)
Dept: GASTROENTEROLOGY | Facility: HOSPITAL | Age: 68
End: 2023-01-21
Payer: MEDICARE

## 2023-01-21 ENCOUNTER — ON CAMPUS - OUTPATIENT (AMBULATORY)
Dept: URBAN - METROPOLITAN AREA HOSPITAL 85 | Facility: HOSPITAL | Age: 68
End: 2023-01-21

## 2023-01-21 DIAGNOSIS — K75.81 NONALCOHOLIC STEATOHEPATITIS (NASH): ICD-10-CM

## 2023-01-21 DIAGNOSIS — D64.9 ANEMIA, UNSPECIFIED: ICD-10-CM

## 2023-01-21 DIAGNOSIS — K63.3 ULCER OF INTESTINE: ICD-10-CM

## 2023-01-21 DIAGNOSIS — K64.0 FIRST DEGREE HEMORRHOIDS: ICD-10-CM

## 2023-01-21 DIAGNOSIS — K55.20 ANGIODYSPLASIA OF COLON WITHOUT HEMORRHAGE: ICD-10-CM

## 2023-01-21 DIAGNOSIS — K62.5 HEMORRHAGE OF ANUS AND RECTUM: ICD-10-CM

## 2023-01-21 DIAGNOSIS — K74.69 OTHER CIRRHOSIS OF LIVER: ICD-10-CM

## 2023-01-21 DIAGNOSIS — K57.30 DIVERTICULOSIS OF LARGE INTESTINE WITHOUT PERFORATION OR ABS: ICD-10-CM

## 2023-01-21 DIAGNOSIS — R18.8 OTHER ASCITES: ICD-10-CM

## 2023-01-21 DIAGNOSIS — D62 ACUTE POSTHEMORRHAGIC ANEMIA: ICD-10-CM

## 2023-01-21 DIAGNOSIS — K62.6 ULCER OF ANUS AND RECTUM: ICD-10-CM

## 2023-01-21 DIAGNOSIS — I10 ESSENTIAL (PRIMARY) HYPERTENSION: ICD-10-CM

## 2023-01-21 DIAGNOSIS — K92.1 MELENA: ICD-10-CM

## 2023-01-21 LAB
AMMONIA BLD-SCNC: 34 UMOL/L (ref 16–60)
ANION GAP SERPL CALCULATED.3IONS-SCNC: 7 MMOL/L (ref 5–15)
ATMOSPHERIC PRESS: ABNORMAL MM[HG]
BASE EXCESS BLDV CALC-SCNC: -5.1 MMOL/L (ref -2–2)
BDY SITE: ABNORMAL
BUN SERPL-MCNC: 45 MG/DL (ref 8–23)
BUN/CREAT SERPL: 20.9 (ref 7–25)
CALCIUM SPEC-SCNC: 8.3 MG/DL (ref 8.6–10.5)
CHLORIDE SERPL-SCNC: 100 MMOL/L (ref 98–107)
CO2 BLDA-SCNC: 21.4 MMOL/L (ref 22–29)
CO2 SERPL-SCNC: 21 MMOL/L (ref 22–29)
CREAT SERPL-MCNC: 2.15 MG/DL (ref 0.76–1.27)
EGFRCR SERPLBLD CKD-EPI 2021: 32.9 ML/MIN/1.73
GLUCOSE BLDC GLUCOMTR-MCNC: 233 MG/DL (ref 70–105)
GLUCOSE BLDC GLUCOMTR-MCNC: 263 MG/DL (ref 70–105)
GLUCOSE BLDC GLUCOMTR-MCNC: 293 MG/DL (ref 70–105)
GLUCOSE BLDC GLUCOMTR-MCNC: 297 MG/DL (ref 70–105)
GLUCOSE BLDC GLUCOMTR-MCNC: 316 MG/DL (ref 70–105)
GLUCOSE SERPL-MCNC: 320 MG/DL (ref 65–99)
HCO3 BLDV-SCNC: 20.2 MMOL/L (ref 22–26)
HCT VFR BLD AUTO: 19.4 % (ref 37.5–51)
HCT VFR BLD AUTO: 22.2 % (ref 37.5–51)
HCT VFR BLD AUTO: 28.9 % (ref 37.5–51)
HCT VFR BLD AUTO: NORMAL %
HGB BLD-MCNC: 6.5 G/DL (ref 13–17.7)
HGB BLD-MCNC: 7.3 G/DL (ref 13–17.7)
HGB BLD-MCNC: 9.5 G/DL (ref 13–17.7)
HGB BLD-MCNC: NORMAL G/DL
INHALED O2 CONCENTRATION: 21 %
MODALITY: ABNORMAL
PCO2 BLDV: 37.7 MM HG (ref 42–51)
PH BLDV: 7.34 PH UNITS (ref 7.32–7.43)
PO2 BLDV: 23.7 MM HG (ref 40–42)
POTASSIUM SERPL-SCNC: 4.3 MMOL/L (ref 3.5–5.2)
SAO2 % BLDCOV: 38.5 % (ref 45–75)
SODIUM SERPL-SCNC: 128 MMOL/L (ref 136–145)

## 2023-01-21 PROCEDURE — 82962 GLUCOSE BLOOD TEST: CPT

## 2023-01-21 PROCEDURE — 96361 HYDRATE IV INFUSION ADD-ON: CPT

## 2023-01-21 PROCEDURE — 36415 COLL VENOUS BLD VENIPUNCTURE: CPT

## 2023-01-21 PROCEDURE — G0378 HOSPITAL OBSERVATION PER HR: HCPCS

## 2023-01-21 PROCEDURE — 86900 BLOOD TYPING SEROLOGIC ABO: CPT

## 2023-01-21 PROCEDURE — 82140 ASSAY OF AMMONIA: CPT

## 2023-01-21 PROCEDURE — 63710000001 INSULIN GLARGINE PER 5 UNITS: Performed by: STUDENT IN AN ORGANIZED HEALTH CARE EDUCATION/TRAINING PROGRAM

## 2023-01-21 PROCEDURE — 85018 HEMOGLOBIN: CPT | Performed by: STUDENT IN AN ORGANIZED HEALTH CARE EDUCATION/TRAINING PROGRAM

## 2023-01-21 PROCEDURE — 99222 1ST HOSP IP/OBS MODERATE 55: CPT | Mod: 25,FS

## 2023-01-21 PROCEDURE — 63710000001 INSULIN LISPRO (HUMAN) PER 5 UNITS: Performed by: HOSPITALIST

## 2023-01-21 PROCEDURE — 45382 COLONOSCOPY W/CONTROL BLEED: CPT | Performed by: INTERNAL MEDICINE

## 2023-01-21 PROCEDURE — 85014 HEMATOCRIT: CPT | Performed by: STUDENT IN AN ORGANIZED HEALTH CARE EDUCATION/TRAINING PROGRAM

## 2023-01-21 PROCEDURE — 36430 TRANSFUSION BLD/BLD COMPNT: CPT

## 2023-01-21 PROCEDURE — 63710000001 INSULIN LISPRO (HUMAN) PER 5 UNITS: Performed by: STUDENT IN AN ORGANIZED HEALTH CARE EDUCATION/TRAINING PROGRAM

## 2023-01-21 PROCEDURE — 96376 TX/PRO/DX INJ SAME DRUG ADON: CPT

## 2023-01-21 PROCEDURE — 63710000001 DIPHENHYDRAMINE PER 50 MG: Performed by: HOSPITALIST

## 2023-01-21 PROCEDURE — 80048 BASIC METABOLIC PNL TOTAL CA: CPT | Performed by: STUDENT IN AN ORGANIZED HEALTH CARE EDUCATION/TRAINING PROGRAM

## 2023-01-21 PROCEDURE — P9016 RBC LEUKOCYTES REDUCED: HCPCS

## 2023-01-21 PROCEDURE — 25010000002 PROPOFOL 200 MG/20ML EMULSION: Performed by: ANESTHESIOLOGIST ASSISTANT

## 2023-01-21 DEVICE — DEV CLIP HEMO RESOLUTION360/ULTR 235CM 17MM STRL: Type: IMPLANTABLE DEVICE | Site: COLON | Status: FUNCTIONAL

## 2023-01-21 RX ORDER — PROPOFOL 10 MG/ML
INJECTION, EMULSION INTRAVENOUS AS NEEDED
Status: DISCONTINUED | OUTPATIENT
Start: 2023-01-21 | End: 2023-01-21 | Stop reason: SURG

## 2023-01-21 RX ORDER — DIPHENHYDRAMINE HYDROCHLORIDE 50 MG/ML
25 INJECTION INTRAMUSCULAR; INTRAVENOUS ONCE
Status: COMPLETED | OUTPATIENT
Start: 2023-01-21 | End: 2023-01-21

## 2023-01-21 RX ORDER — SODIUM CHLORIDE 0.9 % (FLUSH) 0.9 %
3-10 SYRINGE (ML) INJECTION AS NEEDED
Status: CANCELLED | OUTPATIENT
Start: 2023-01-21

## 2023-01-21 RX ORDER — ACETAMINOPHEN 650 MG/1
650 SUPPOSITORY RECTAL ONCE
Status: COMPLETED | OUTPATIENT
Start: 2023-01-21 | End: 2023-01-21

## 2023-01-21 RX ORDER — SORBITOL SOLUTION 70 %
50 SOLUTION, ORAL MISCELLANEOUS
Status: COMPLETED | OUTPATIENT
Start: 2023-01-21 | End: 2023-01-21

## 2023-01-21 RX ORDER — ACETAMINOPHEN 325 MG/1
650 TABLET ORAL ONCE
Status: COMPLETED | OUTPATIENT
Start: 2023-01-21 | End: 2023-01-21

## 2023-01-21 RX ORDER — EPHEDRINE SULFATE 5 MG/ML
INJECTION INTRAVENOUS AS NEEDED
Status: DISCONTINUED | OUTPATIENT
Start: 2023-01-21 | End: 2023-01-21 | Stop reason: SURG

## 2023-01-21 RX ORDER — LACTULOSE 10 G/15ML
20 SOLUTION ORAL 2 TIMES DAILY
Status: DISCONTINUED | OUTPATIENT
Start: 2023-01-22 | End: 2023-01-23 | Stop reason: HOSPADM

## 2023-01-21 RX ORDER — INSULIN LISPRO 100 [IU]/ML
3-14 INJECTION, SOLUTION INTRAVENOUS; SUBCUTANEOUS
Status: DISCONTINUED | OUTPATIENT
Start: 2023-01-21 | End: 2023-01-23 | Stop reason: HOSPADM

## 2023-01-21 RX ORDER — PHENYLEPHRINE HCL IN 0.9% NACL 1 MG/10 ML
SYRINGE (ML) INTRAVENOUS AS NEEDED
Status: DISCONTINUED | OUTPATIENT
Start: 2023-01-21 | End: 2023-01-21 | Stop reason: SURG

## 2023-01-21 RX ORDER — DIPHENHYDRAMINE HCL 25 MG
25 CAPSULE ORAL ONCE
Status: COMPLETED | OUTPATIENT
Start: 2023-01-21 | End: 2023-01-21

## 2023-01-21 RX ORDER — SODIUM CHLORIDE 0.9 % (FLUSH) 0.9 %
3 SYRINGE (ML) INJECTION EVERY 12 HOURS SCHEDULED
Status: CANCELLED | OUTPATIENT
Start: 2023-01-21

## 2023-01-21 RX ORDER — ACETAMINOPHEN 160 MG/5ML
650 SOLUTION ORAL ONCE
Status: COMPLETED | OUTPATIENT
Start: 2023-01-21 | End: 2023-01-21

## 2023-01-21 RX ORDER — INSULIN LISPRO 100 [IU]/ML
5 INJECTION, SOLUTION INTRAVENOUS; SUBCUTANEOUS
Status: DISCONTINUED | OUTPATIENT
Start: 2023-01-21 | End: 2023-01-23 | Stop reason: HOSPADM

## 2023-01-21 RX ADMIN — DIPHENHYDRAMINE HYDROCHLORIDE 25 MG: 25 CAPSULE ORAL at 11:47

## 2023-01-21 RX ADMIN — MONTELUKAST 10 MG: 10 TABLET, FILM COATED ORAL at 20:21

## 2023-01-21 RX ADMIN — INSULIN LISPRO 8 UNITS: 100 INJECTION, SOLUTION INTRAVENOUS; SUBCUTANEOUS at 17:46

## 2023-01-21 RX ADMIN — Medication 200 MCG: at 15:33

## 2023-01-21 RX ADMIN — INSULIN LISPRO 5 UNITS: 100 INJECTION, SOLUTION INTRAVENOUS; SUBCUTANEOUS at 21:00

## 2023-01-21 RX ADMIN — INSULIN GLARGINE 15 UNITS: 100 INJECTION, SOLUTION SUBCUTANEOUS at 20:21

## 2023-01-21 RX ADMIN — INSULIN LISPRO 5 UNITS: 100 INJECTION, SOLUTION INTRAVENOUS; SUBCUTANEOUS at 17:47

## 2023-01-21 RX ADMIN — ROSUVASTATIN 10 MG: 10 TABLET, FILM COATED ORAL at 20:21

## 2023-01-21 RX ADMIN — Medication 200 MCG: at 15:13

## 2023-01-21 RX ADMIN — RIFAXIMIN 550 MG: 550 TABLET ORAL at 20:21

## 2023-01-21 RX ADMIN — EPHEDRINE SULFATE 10 MG: 5 INJECTION INTRAVENOUS at 15:18

## 2023-01-21 RX ADMIN — PANTOPRAZOLE SODIUM 40 MG: 40 INJECTION, POWDER, FOR SOLUTION INTRAVENOUS at 20:21

## 2023-01-21 RX ADMIN — EPHEDRINE SULFATE 5 MG: 5 INJECTION INTRAVENOUS at 15:35

## 2023-01-21 RX ADMIN — SODIUM CHLORIDE 75 ML/HR: 9 INJECTION, SOLUTION INTRAVENOUS at 17:50

## 2023-01-21 RX ADMIN — Medication 10 ML: at 09:15

## 2023-01-21 RX ADMIN — Medication 200 MCG: at 15:38

## 2023-01-21 RX ADMIN — Medication 150 MCG: at 15:28

## 2023-01-21 RX ADMIN — Medication 150 MCG: at 15:24

## 2023-01-21 RX ADMIN — Medication 150 MCG: at 15:19

## 2023-01-21 RX ADMIN — SERTRALINE 100 MG: 100 TABLET, FILM COATED ORAL at 17:47

## 2023-01-21 RX ADMIN — INSULIN LISPRO 8 UNITS: 100 INJECTION, SOLUTION INTRAVENOUS; SUBCUTANEOUS at 12:34

## 2023-01-21 RX ADMIN — ACETAMINOPHEN 650 MG: 325 TABLET, FILM COATED ORAL at 11:47

## 2023-01-21 RX ADMIN — SORBITOL SOLUTION (BULK) 50 ML: 70 SOLUTION at 10:42

## 2023-01-21 RX ADMIN — EPHEDRINE SULFATE 10 MG: 5 INJECTION INTRAVENOUS at 15:38

## 2023-01-21 RX ADMIN — PROPOFOL 610 MG: 10 INJECTION, EMULSION INTRAVENOUS at 15:13

## 2023-01-21 RX ADMIN — Medication 10 ML: at 20:21

## 2023-01-21 RX ADMIN — INSULIN LISPRO 8 UNITS: 100 INJECTION, SOLUTION INTRAVENOUS; SUBCUTANEOUS at 09:14

## 2023-01-21 RX ADMIN — SORBITOL SOLUTION (BULK) 50 ML: 70 SOLUTION at 11:47

## 2023-01-21 RX ADMIN — Medication 200 MCG: at 15:15

## 2023-01-21 RX ADMIN — PANTOPRAZOLE SODIUM 40 MG: 40 INJECTION, POWDER, FOR SOLUTION INTRAVENOUS at 09:14

## 2023-01-21 NOTE — ANESTHESIA PREPROCEDURE EVALUATION
Anesthesia Evaluation     Patient summary reviewed and Nursing notes reviewed   history of anesthetic complications:  NPO Solid Status: > 8 hours  NPO Liquid Status: > 8 hours           Airway   Dental      Pulmonary    (+) a smoker Former,   Cardiovascular     ECG reviewed    (+) hypertension, hyperlipidemia,       Neuro/Psych  GI/Hepatic/Renal/Endo    (+)  GI bleeding , liver disease fatty liver disease cirrhosis, renal disease CRI, diabetes mellitus,     Musculoskeletal     Abdominal    Substance History      OB/GYN          Other   blood dyscrasia anemia,   history of cancer    ROS/Med Hx Other: Hyperglycemia, hypocalcemia, hyponatremia, renal insufficiency, PERALES, hematuria, allergies, renal cancer, esophageal varices, ascites, gallstones    Aspirates with anesthesia    PSH  NEPHRECTOMY SHOULDER SURGERY  UPPER GASTROINTESTINAL ENDOSCOPY COLONOSCOPY  ENDOSCOPY LIVER BIOPSY  ENDOSCOPY COLONOSCOPY                    Anesthesia Plan    ASA 4     MAC     (Patient identified; pre-operative vital signs, all relevant labs/studies, complete medical/surgical/anesthetic history, full medication list, full allergy list, and NPO status obtained/reviewed; physical assessment performed; anesthetic options, side effects, potential complications, risks, and benefits discussed; questions answered; written anesthesia consent obtained; patient cleared for procedure; anesthesia machine and equipment checked and functioning)    Anesthetic plan, risks, benefits, and alternatives have been provided, discussed and informed consent has been obtained with: patient.    Plan discussed with CRNA and CAA.        CODE STATUS:    Code Status (Patient has no pulse and is not breathing): CPR (Attempt to Resuscitate)  Medical Interventions (Patient has pulse or is breathing): Full Support

## 2023-01-21 NOTE — OP NOTE
FLEXIBLE SIGMOIDOSCOPY WITH BIOPSY Procedure Report    Patient Name:  Lei Mercado  YOB: 1955    Date of Surgery:  1/21/2023     Pre-Op Diagnosis:  Anemia, unspecified type [D64.9]  Rectal bleeding [K62.5]         Procedure/CPT® Codes:      Procedure(s):  FLEXIBLE SIGMOIDOSCOPY WITh endoscopic clipping    Staff:  Surgeon(s):  Tuyet Retana MD      Anesthesia: Monitored Anesthesia Care    Description of Procedure:  A description of the procedure as well as risks, benefits and alternative methods were explained to the patient who voiced understanding and signed the corresponding consent form. A physical exam was performed and vital signs were monitored throughout the procedure.    A rectal exam was performed which was normal. An Olympus colonoscope was placed into the rectum and proceeded under direct visualization through the colon until the cecum and appendiceal orifice were identified. Careful visualization occurred upon slow withdraw of the scope. The scope was then retroflexed and the distal rectum was visualized. The quality of the prep was good. The procedure was not difficult and there were no immediate complications.    Findings:   2 ulcer was noticed from previously cauterized AVM and polypectomy site there were clean-based not bleeding.  Portal colopathy was noticed with nonbleeding small AVM which is very typical for portal colopathy.  These AVM then with a significant etiology for bleeding.  Diverticulosis was seen.    Large ulcerated area was seen with a small oozing at the distal part of the rectum with the band still in position almost ready to come off.  1 Endo Clip was applied at the base of the ulcer without further oozing from that area.  Grade 1-2 sized hemorrhoid were seen.  No other bleeding source were noticed.  Patient Toller procedure very well immediate complication was noticed.    Impression:  1.  Suspect recent rectal bleeding from post banding ulcer site status post of 1  Endo Clip placement no further bleeding was noticed.  2.  2 clean-based ulcers were noticed in ascending colon area from recent AVM cauterization and polypectomy site, these were clean base without any sign of bleeding.  3.  Severe portal hypertensive colopathy with nonbleeding small AVM usually seen with the portal colopathy and are less likely source of active bleeding.      Patient has been having a recurrent bright red blood per rectum for several month have dropped hemoglobin previously to 6 and 5, has a negative upper endoscopy examination, suspect that is most likely anorectal in source.  If this recurrent rectal bleeding continues, examination under anesthesia with possible ligation of larger hemorrhoid may be needed.  Because of a history of cirrhosis we will avoid further hemorrhoid banding.  We will also perform stat bleeding scan to rule out other etiology.  Doubt small bowel source given lack of any hemodynamic instability during these episodes.    Recommendations:  Follow the hemoglobin.  Start soft diet.  Resume other p.o. medication.  He will be given IV iron in the morning.  Continue with the lactulose and Xifaxan.        Tuyet Retana MD     Date: 1/21/2023    Time: 18:01 EST

## 2023-01-21 NOTE — NURSING NOTE
Order from Dr. Reyes to go ahead and give second unit of PRBC. Tylenol and Benadryl to be given before hand.

## 2023-01-21 NOTE — PLAN OF CARE
Goal Outcome Evaluation:      Pt is able to make needs known. No c/o pain during shift. VSS. Education is complete, call light is in reach, and no other needs at this time. Will continue to monitor.

## 2023-01-21 NOTE — ED NOTES
"Blood bank contacted about the pts blood per provider request. Blood bank tech stated the pts blood has an antibody and is taking a little longer to match. Tech stated \"it won't be too much longer\" Provider aware   "

## 2023-01-21 NOTE — PLAN OF CARE
Repeat h/h is 6.5 after 1u prbc, another unit ordered, no gross bleeding, hemodynamically stable    Electronically signed by Leighton Segovia DO, 01/21/23, 5:36 AM EST.

## 2023-01-21 NOTE — CASE MANAGEMENT/SOCIAL WORK
Discharge Planning Assessment  [facility]     Patient Name: Lei Mercado  MRN: [id number]  Today's Date: 1/21/2023    Admit Date: 1/20/2023    Plan: D/C Plan: Anticipate Routine Home   Discharge Needs Assessment     Row Name 01/21/23 1424       Living Environment    People in Home alone    Current Living Arrangements home    Primary Care Provided by self    Provides Primary Care For no one, unable/limited ability to care for self    Family Caregiver if Needed sibling(s)    Family Caregiver Names Sheila Green-Sister    Quality of Family Relationships supportive    Able to Return to Prior Arrangements yes       Resource/Environmental Concerns    Resource/Environmental Concerns none       Food Insecurity    Within the past 12 months, you worried that your food would run out before you got the money to buy more. Never true    Within the past 12 months, the food you bought just didn't last and you didn't have money to get more. Never true       Transition Planning    Patient/Family Anticipates Transition to home;home with help/services    Patient/Family Anticipated Services at Transition none;home health care    Transportation Anticipated family or friend will provide;car, drives self       Discharge Needs Assessment    Equipment Currently Used at Home none    Concerns to be Addressed denies needs/concerns at this time    Anticipated Changes Related to Illness inability to care for self    Equipment Needed After Discharge none    Provided Post Acute Provider List? N/A    Provided Post Acute Provider Quality & Resource List? N/A               Discharge Plan     Row Name 01/21/23 1425       Plan    Plan D/C Plan: Anticipate Routine Home    Plan Comments Spoke with pt at bedside, confirmed PCP and he is enrolled in [facility]. Denies dc needs or concerns at this time. Pt states his car is at the hospital and he will drive self home. States sister, Sheila, will provide transportation if he is unable to drive self.               Continued Care and Services - Admitted Since 1/20/2023    Coordination has not been started for this encounter.          Demographic Summary     Row Name 01/21/23 1423       General Information    Admission Type observation    Arrived From emergency department    Referral Source emergency department    Reason for Consult discharge planning    Preferred Language English       Contact Information    Contact Information Obtained for                Functional Status     Row Name 01/21/23 1423       Functional Status    Usual Activity Tolerance moderate    Current Activity Tolerance fair       Functional Status, IADL    Medications independent    Meal Preparation independent    Housekeeping independent    Laundry independent    Shopping independent    IADL Comments Pt state he is IADL's and his sister, Sheila, assists as needed.       Mental Status    General Appearance WDL WDL       Mental Status Summary    Recent Changes in Mental Status/Cognitive Functioning no changes              Met with patient in room wearing PPE: mask    Maintained distance greater than six feet and spent less than 15 minutes in the room    Claudine Barber RN    Phone [phone]  Fax [phone]

## 2023-01-21 NOTE — CONSULTS
GI CONSULT  NOTE:    Referring Provider:  Dr. Segovia    Chief complaint: anemia      History of present illness: Lei Mercado is a 67 y.o. 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 The patient is well-known to our practice and is normally followed by Dr. Park on an outpatient basis.  He has a pending referral to the transplant team at [facility] with an appointment on 1/24/2023.  He has received 2 units PRBCs upon admission. Hgb is 7.3 this morning.   He had recent EGD on 12/27/2022 by Dr. Calixto which showed portal hypertensive gastropathy as well as erythema/friability of entire stomach with no active bleeding.  He then subsequently underwent colonoscopy on 1/6/2023 by Dr. 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  He followed up in our office on 1/19/2023 and had internal hemorrhoid banding x1.  He presents today reporting intermittent bright red blood per rectum that has been ongoing over the past 6 months or so. His last bowel movement was yesterday with small amount of bright red blood per rectum. He denies any melena.  No hematemesis.  He denies abdominal pain.  No weight loss.      Endo History:  1/6/2023 Colonoscopy by Dr. Park - HP & TA polyps, numerous AVMs in the ascending colon without signs of active bleeding s/p APC and grade 1 large internal hemorrhoids  12/27/2022 EGD by Dr. Calixto - portal hypertensive gastropathy as well as erythema/friability of entire stomach with no active bleeding  10/2018 Colonoscopy (Dr. Retana) - mucosal polyp, internal hemorrhoids, diverticulosis    Past Medical History:  Past Medical History:   Diagnosis Date   • Anemia 09/2011   • Anesthesia  "complication     AWAKENED EARLY, ASPIRATION PNEUMONIA   • Cancer (HCC)     KIDNEY   • Cirrhosis (HCC)    • Colon polyp    • Diabetes mellitus (HCC)    • Diverticulitis of colon    • Esophageal varices (HCC)    • Fatty liver    • Gallstones    • Hyperlipidemia    • Hypertension    • Liver disease        Past Surgical History:  Past Surgical History:   Procedure Laterality Date   • COLONOSCOPY  approx 2018    benign polyps per pt    • COLONOSCOPY N/A 2023    Procedure: COLONOSCOPY with argon plasma coagulation and polypectomy x 3;  Surgeon: Kevin Park MD;  Location: [facility] ENDOSCOPY;  Service: Gastroenterology;  Laterality: N/A;  post op: polyps, internal hemorrhoids, AVM   • ENDOSCOPY N/A 2022    Procedure: ESOPHAGOGASTRODUODENOSCOPY;  Surgeon: David Almeida MD;  Location: [facility] ENDOSCOPY;  Service: Gastroenterology;  Laterality: N/A;  pre - ruq pain, hx parikh, cirrhosis  post - hiatal hernia   • ENDOSCOPY N/A 2022    Procedure: ESOPHAGOGASTRODUODENOSCOPY;  Surgeon: TANVIR Calixto MD;  Location: [facility] ENDOSCOPY;  Service: Gastroenterology;  Laterality: N/A;  post: portal hypertensive gastropathy, gastric polyps   • LIVER BIOPSY     • NEPHRECTOMY Right     partial    • SHOULDER SURGERY      right shoulder \"reverse\" per pt    • UPPER GASTROINTESTINAL ENDOSCOPY  approx 2018    negative per pt        Social History:  Social History     Tobacco Use   • Smoking status: Former     Packs/day: 1.00     Years: 15.00     Pack years: 15.00     Types: Cigarettes     Start date: 1971     Quit date: 1984     Years since quittin.7   • Smokeless tobacco: Never   • Tobacco comments:     quit 20 plus years ago    Vaping Use   • Vaping Use: Never used   Substance Use Topics   • Alcohol use: Never   • Drug use: Not Currently     Types: Marijuana     Comment: Abstinent for since early adulthood       Family History:  Family History   Problem Relation Age of Onset   • Cirrhosis Mother    • " Other Father         Unknown   • Liver disease Sister    • Malig Hyperthermia Neg Hx        Medications:  (Not in a hospital admission)      Scheduled Meds:insulin glargine, 15 Units, Subcutaneous, Nightly  insulin lispro, 3-14 Units, Subcutaneous, Q6H  insulin lispro, 5 Units, Subcutaneous, TID With Meals  montelukast, 10 mg, Oral, Nightly  pantoprazole, 40 mg, Intravenous, BID  rosuvastatin, 10 mg, Oral, Nightly  sertraline, 100 mg, Oral, Q PM  sodium chloride, 10 mL, Intravenous, Q12H  sorbitol, 50 mL, Oral, Q1H      Continuous Infusions:sodium chloride, 75 mL/hr, Last Rate: 75 mL/hr (01/20/23 5794)      PRN Meds:.•  dextrose  •  dextrose  •  glucagon (human recombinant)  •  nitroglycerin  •  ondansetron **OR** ondansetron  •  [COMPLETED] Insert Peripheral IV **AND** sodium chloride  •  sodium chloride  •  sodium chloride    ALLERGIES:  Patient has no known allergies.    ROS:  The following systems were reviewed   Constitution:  No fevers, chills, no unintentional weight loss  Skin: + jaundice  Eyes:  No blurry vision, no eye pain  HENT:  No change in hearing or smell  Resp:  No dyspnea or cough  CV:  No chest pain or palpitations  :  No dysuria, hematuria  Musculoskeletal:  No leg cramps or arthralgias  Neuro:  No tremor, no numbness  Psych:  No depression or confusion    Objective     Vital Signs:   Vitals:    01/21/23 0250 01/21/23 0440 01/21/23 0445 01/21/23 0710   BP: 127/58 123/43 131/53 117/53   BP Location:    Right arm   Patient Position:  Lying  Lying   Pulse: 79 74 74 67   Resp: 20 22 21 20   Temp: 98 °F (36.7 °C) 98.6 °F (37 °C) 98.1 °F (36.7 °C) 97.5 °F (36.4 °C)   TempSrc: (P) Oral Oral Oral Oral   SpO2: 100% 100% 100% 100%   Weight:       Height:           Physical Exam:       General Appearance:    Awake and alert, in no acute distress   Head:    Normocephalic, without obvious abnormality, atraumatic   Throat:   No oral lesions, no thrush, oral mucosa moist   Lungs:     Respirations regular,  even and unlabored   Chest Wall:    No abnormalities observed   Abdomen:     Soft, non-tender, no rebound or guarding   Rectal:     Deferred   Extremities:   Moves all extremities, no edema, no cyanosis                       Results Review:   I reviewed the patient's labs and imaging.  CBC    Results from last 7 days   Lab Units 01/21/23  0606 01/21/23  0133 01/20/23  1654   WBC 10*3/mm3  --   --  2.40*   HEMOGLOBIN g/dL 7.3* 6.5* 6.6*   PLATELETS 10*3/mm3  --   --  98*     CMP   Results from last 7 days   Lab Units 01/21/23  0606 01/20/23  1654   SODIUM mmol/L 128* 123*   POTASSIUM mmol/L 4.3 4.9   CHLORIDE mmol/L 100 94*   CO2 mmol/L 21.0* 19.0*   BUN mg/dL 45* 47*   CREATININE mg/dL 2.15* 2.44*   GLUCOSE mg/dL 320* 686*   ALBUMIN g/dL  --  3.8   BILIRUBIN mg/dL  --  3.5*   ALK PHOS U/L  --  182*   AST (SGOT) U/L  --  15   ALT (SGPT) U/L  --  10   MAGNESIUM mg/dL  --  1.9   PHOSPHORUS mg/dL  --  3.9     Cr Clearance Estimated Creatinine Clearance: 46.2 mL/min (A) (by C-G formula based on SCr of 2.15 mg/dL (H)).  Coag   Results from last 7 days   Lab Units 01/20/23  1654   INR  1.11*     HbA1C   Lab Results   Component Value Date    HGBA1C 5.0 12/28/2022    HGBA1C 5.1 12/26/2022    HGBA1C 7.9 (H) 05/09/2022     Blood Glucose   Glucose   Date/Time Value Ref Range Status   01/21/2023 0658 293 (H) 70 - 105 mg/dL Final     Comment:     Serial Number: [id number]:  [id number]   01/21/2023 0030 316 (H) 70 - 105 mg/dL Final     Comment:     Serial Number: [id number]:  [id number]   01/20/2023 2248 437 (C) 70 - 105 mg/dL Final     Comment:     Serial Number: [id number]:  [id number]   01/20/2023 1925 429 (C) 70 - 105 mg/dL Final     Comment:     Serial Number: [id number]:  [id number]     Infection     UA      Radiology(recent) No radiology results for the last day       ASSESSMENT:   67 y.o. 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     - Acute blood loss anemia - likely secondary to internal hemorrhoid/post banding ulcer.  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  N.p.o. after finishing doses of sorbitol.  Instructed the patient to notify the nursing staff when having bowel movements.  Monitor stool color.  Consider stat bleeding scan if bright red blood per rectum is noted during bowel purge.  Recheck hemoglobin.  Recommend transfuse PRBCs for hemoglobin less than 7.  Will also give IV iron.  Continue PPI.  Supportive care.  GI will follow.    I discussed the patients findings and my recommendations with the patient.    We appreciate the referral.     Electronically signed by DONALD Cabrera, 01/21/23, 11:15 AM EST.

## 2023-01-21 NOTE — H&P
[facility] Medicine Services      Patient Name: Lei Mercado  : 1955  MRN: [id number]  Primary Care Physician:  Froylan June  Date of admission: 2023      Subjective      Chief Complaint: anemia    History of Present Illness: Lei Mercado is a 67 y.o. male with PMHx of PARIKH cirrrhosis, hemrroids, anemia who presented to [facility] on 2023 complaining of anemia.  OF note, patient discharged from [facility] on 23 after undergoing coloscopy with plasma coagulation and polypectomy.  At follow up GI appointment today, he was found to be anemic and sent to [facility].  Denies abdominal pain, hemoptysis, hemeatemesis.  He does admit to melena.    In the ED, labs notable for glucose 686, sodium 123, bicarb 19, Cl 94, Cr 2.44, BUN 47, hgb 6.6, plt 98.  Stool heme positive.  He is to be admitted for blood transfusion, glucose control, and GI eval.      ROS   12 point ROS reviewed and negative except as mentioned above      Personal History     Past Medical History:   Diagnosis Date   • Anemia 2011   • Anesthesia complication     AWAKENED EARLY, ASPIRATION PNEUMONIA   • Cancer (HCC)     KIDNEY   • Cirrhosis (HCC)    • Colon polyp    • Diabetes mellitus (HCC)    • Diverticulitis of colon    • Esophageal varices (HCC)    • Fatty liver    • Gallstones    • Hyperlipidemia    • Hypertension    • Liver disease        Past Surgical History:   Procedure Laterality Date   • COLONOSCOPY  approx 2018    benign polyps per pt    • COLONOSCOPY N/A 2023    Procedure: COLONOSCOPY with argon plasma coagulation and polypectomy x 3;  Surgeon: Kevin Park MD;  Location: [facility] ENDOSCOPY;  Service: Gastroenterology;  Laterality: N/A;  post op: polyps, internal hemorrhoids, AVM   • ENDOSCOPY N/A 2022    Procedure: ESOPHAGOGASTRODUODENOSCOPY;  Surgeon: David Almeida MD;  Location: [facility] ENDOSCOPY;  Service: Gastroenterology;  Laterality: N/A;  pre - ruq pain, hx parikh, cirrhosis  post  "- hiatal hernia   • ENDOSCOPY N/A 12/27/2022    Procedure: ESOPHAGOGASTRODUODENOSCOPY;  Surgeon: TANVIR Calixto MD;  Location: [facility] ENDOSCOPY;  Service: Gastroenterology;  Laterality: N/A;  post: portal hypertensive gastropathy, gastric polyps   • LIVER BIOPSY     • NEPHRECTOMY Right     partial    • SHOULDER SURGERY      right shoulder \"reverse\" per pt    • UPPER GASTROINTESTINAL ENDOSCOPY  approx 2018    negative per pt        Family History: family history includes Cirrhosis in his mother; Liver disease in his sister; Other in his father. Otherwise pertinent FHx was reviewed and not pertinent to current issue.    Social History:  reports that he quit smoking about 38 years ago. His smoking use included cigarettes. He started smoking about 51 years ago. He has a 15.00 pack-year smoking history. He has never used smokeless tobacco. He reports that he does not currently use drugs after having used the following drugs: Marijuana. He reports that he does not drink alcohol.    Home Medications:  Prior to Admission Medications     Prescriptions Last Dose Informant Patient Reported? Taking?    folic acid (FOLVITE) 1 MG tablet   No No    Take 1 tablet by mouth Daily for 30 days.    metFORMIN ER (GLUCOPHAGE-XR) 500 MG 24 hr tablet   Yes No    Take 500 mg by mouth Every Evening.    pantoprazole (PROTONIX) 40 MG EC tablet   No No    Take 1 tablet by mouth 2 (Two) Times a Day Before Meals for 30 days.    pioglitazone (ACTOS) 15 MG tablet   Yes No    Take 15 mg by mouth Every Evening.    rosuvastatin (CRESTOR) 10 MG tablet   Yes No    Take 10 mg by mouth Every Night.    sertraline (ZOLOFT) 100 MG tablet   Yes No    Take 200 mg by mouth Every Evening.    terazosin (HYTRIN) 10 MG capsule   Yes No    Take 10 mg by mouth Every Night.    montelukast (SINGULAIR) 10 MG tablet   Yes No    Take 10 mg by mouth every night at bedtime.            Allergies:  No Known Allergies    Objective      Vitals:   Temp:  [97.5 °F (36.4 °C)] " 97.5 °F (36.4 °C)  Heart Rate:  [79-90] 85  Resp:  [22] 22  BP: (107-139)/(39-65) 107/41    Physical Exam  Constitutional:       General: He is not in acute distress.     Appearance: Normal appearance.   HENT:      Head: Normocephalic and atraumatic.      Nose: No congestion.      Mouth/Throat:      Pharynx: No oropharyngeal exudate.   Eyes:      General: No scleral icterus.  Cardiovascular:      Rate and Rhythm: Normal rate and regular rhythm.      Heart sounds: No murmur heard.    No friction rub. No gallop.   Pulmonary:      Effort: No respiratory distress.      Breath sounds: No wheezing or rales.   Abdominal:      General: There is no distension.      Tenderness: There is no abdominal tenderness. There is no guarding.   Musculoskeletal:         General: No swelling or deformity.      Cervical back: No rigidity.      Right lower leg: No edema.   Skin:     Coloration: Skin is pale. Skin is not jaundiced.      Findings: No bruising or lesion.   Neurological:      General: No focal deficit present.      Mental Status: He is alert and oriented to person, place, and time.      Motor: No weakness.            Result Review    Result Review:  I have personally reviewed the results from the time of this admission to 1/20/2023 20:13 EST and agree with these findings:  [x]  Laboratory  []  Microbiology  []  Radiology  []  EKG/Telemetry   []  Cardiology/Vascular   []  Pathology  [x]  Old records  []  Other:        Assessment & Plan        Active Hospital Problems:  There are no active hospital problems to display for this patient.    Plan:     #Melena  #Acute blood loss anemia  #Hemorrhoids    - Colonoscopy on 1/6/23 revealed polyps with AVM and internal hemorrhoids, s/p argon cogaulation and polypectomy    - He has been getting hemorrhoids banded one by one at GI office    - hgb was <7.0 at GI office, sent to [facility]    - H/H: 6.6/21    - 1u prbc ordered, patient has antibodies    - fobt + and patient with melena    - Protonix  40mg IV BID    - NPO    - gi Consulted to consider endoscopy    - check iron panel and ferritin    - trend H/H    - maintain hgb > 7.0      #PERALES cirrhosis    - MELD 28    - patient has transplant evaluation in [place] next Tuesday     #DM2    - glucose 686 on admission with AG 10, bicarb 19    - hold home PO meds    - check A1c    - lantus 15u SC daily    - ISS    - IVF NS    - diabetic educator    #Hyponatremia    - sodium 123 on admission, suspect pseudohyponatremia 2/2 hyperglycemia    #MARY on CKD    - Cr 2.4 on admission, base is ~1.9    - suspect volume depletion from blood loss and hyperglycemia    - IVF NS @ 75/hr, watch for fluid overload 2/2 cirrhosis    #Thrombocytopenia    -plt 98, suspect 2/2 PERALES cirrhosis    - monitor labs    - transfer if gross bleeding    #GERD    PPI    #HLD    -continue home statin    #Anxiety    -continue home zoloft      DVT prophylaxis: SCDs due to anemia and GIB    CODE STATUS:       Admission Status:  I believe this patient meets observation status.    I discussed the patient's findings and my recommendations with patient.    This patient has been examined wearing appropriate Personal Protective Equipment and discussed with PAtient. 01/20/23      Signature: Electronically signed by Leighton Segovia DO, 01/20/23, 8:34 PM EST.

## 2023-01-21 NOTE — ANESTHESIA POSTPROCEDURE EVALUATION
Patient: Lei Mercado    Procedure Summary     Date: 01/21/23 Room / Location: [facility] ENDOSCOPY 1 / [facility] ENDOSCOPY    Anesthesia Start: 1505 Anesthesia Stop: 1538    Procedure: FLEXIBLE SIGMOIDOSCOPY WITh endoscopic clipping Diagnosis:       Anemia, unspecified type      Rectal bleeding      (Anemia, unspecified type [D64.9])      (Rectal bleeding [K62.5])    Surgeons: Tuyet Retana MD Provider: Munir Whitley MD    Anesthesia Type: MAC ASA Status: 4          Anesthesia Type: MAC    Vitals  Vitals Value Taken Time   /60 01/21/23 1619   Temp     Pulse 69 01/21/23 1623   Resp 16 01/21/23 1614   SpO2 100 % 01/21/23 1623   Vitals shown include unvalidated device data.        Post Anesthesia Care and Evaluation    Patient location during evaluation: PACU  Patient participation: complete - patient participated  Level of consciousness: awake  Pain scale: See nurse's notes for pain score.  Pain management: adequate    Airway patency: patent  Anesthetic complications: No anesthetic complications  PONV Status: none  Cardiovascular status: acceptable  Respiratory status: acceptable and spontaneous ventilation  Hydration status: acceptable    Comments: Patient seen and examined postoperatively; vital signs stable; SpO2 greater than or equal to 90%; cardiopulmonary status stable; nausea/vomiting adequately controlled; pain adequately controlled; no apparent anesthesia complications; patient discharged from anesthesia care when discharge criteria were met

## 2023-01-21 NOTE — PROGRESS NOTES
[facility]     Progress Note    Patient Name: Lei Mercado  : 1955  MRN: [id number]  Primary Care Physician:  Froylan June  Date of admission: 2023  Service date and time: 23 13:47 EST  Subjective   Subjective     Chief Complaint: melena and weakness    HPI:  Patient to get endoscopy today      Objective   Objective     Vitals:   Temp:  [97.2 °F (36.2 °C)-98.6 °F (37 °C)] 97.9 °F (36.6 °C)  Heart Rate:  [67-90] 82  Resp:  [15-24] 17  BP: ()/(34-67) 121/67  Physical Exam    Constitutional: Awake, alert   Eyes: PERRLA, sclerae anicteric, no conjunctival injection   HENT: NCAT, mucous membranes moist   Neck: Supple, no thyromegaly, no lymphadenopathy, trachea midline   Respiratory: Clear to auscultation bilaterally, nonlabored respirations    Cardiovascular: RRR, no murmurs, rubs, or gallops, palpable pedal pulses bilaterally   Gastrointestinal: Positive bowel sounds, soft, nontender, nondistended   Musculoskeletal: No bilateral ankle edema, no clubbing or cyanosis to extremities   Psychiatric: Appropriate affect, cooperative   Neurologic: Oriented x 3, strength symmetric in all extremities, Cranial Nerves grossly intact to confrontation, speech clear   Skin: No rashes     Result Review    Result Review:  I have personally reviewed the results from the time of this admission to 2023 13:47 EST and agree with these findings:  [x]  Laboratory list / accordion  [x]  Microbiology  [x]  Radiology  [x]  EKG/Telemetry   [x]  Cardiology/Vascular   []  Pathology  []  Old records  []  Other:        Assessment & Plan   Assessment / Plan       Active Hospital Problems:  Active Hospital Problems    Diagnosis    • **Anemia, unspecified type    • Rectal bleeding      Plan:      #Melena  #Acute blood loss anemia  #Hemorrhoids  - s/p 2 units, cont to trend, GI to do scopes today    - Colonoscopy on 23 revealed polyps with AVM and internal hemorrhoids, s/p argon cogaulation and polypectomy    - He has  been getting hemorrhoids banded one by one at GI office    - hgb was <7.0 at GI office, sent to [facility]    - H/H: 6.6/21    - 1u prbc ordered, patient has antibodies    - fobt + and patient with melena    - Protonix 40mg IV BID    - NPO    - gi Consulted to consider endoscopy    - check iron panel and ferritin    - trend H/H    - maintain hgb > 7.0      PERALES cirrhosis    - MELD 28    - patient has transplant evaluation in [place] on Tuesday      DM2, uncontrolled    - hold home PO meds    -  A1c pending    - lantus 15u SC daily    - ISS    - IVF NS    - diabetic educator     Hyponatremia    - improving, cont to trend     MARY on CKD    - Cr 2.4 on admission, base is ~1.9    - cont to trend    - IVF NS @ 75/hr, watch for fluid overload 2/2 cirrhosis     Thrombocytopenia    -plt 98, suspect 2/2 PERALES cirrhosis    - monitor labs    - transfer if gross bleeding     GERD    PPI     HLD    -continue home statin     Anxiety    -continue home zoloft    DVT prophylaxis:  Mechanical DVT prophylaxis orders are present.    CODE STATUS:   Code Status (Patient has no pulse and is not breathing): CPR (Attempt to Resuscitate)  Medical Interventions (Patient has pulse or is breathing): Full Support    Disposition:  I expect patient to be discharged 1-2 days.    Albert Reyes MD

## 2023-01-22 ENCOUNTER — ON CAMPUS - OUTPATIENT (AMBULATORY)
Dept: URBAN - METROPOLITAN AREA HOSPITAL 85 | Facility: HOSPITAL | Age: 68
End: 2023-01-22

## 2023-01-22 DIAGNOSIS — R18.8 OTHER ASCITES: ICD-10-CM

## 2023-01-22 DIAGNOSIS — Z85.528 PERSONAL HISTORY OF OTHER MALIGNANT NEOPLASM OF KIDNEY: ICD-10-CM

## 2023-01-22 DIAGNOSIS — K62.5 HEMORRHAGE OF ANUS AND RECTUM: ICD-10-CM

## 2023-01-22 DIAGNOSIS — D62 ACUTE POSTHEMORRHAGIC ANEMIA: ICD-10-CM

## 2023-01-22 DIAGNOSIS — K75.81 NONALCOHOLIC STEATOHEPATITIS (NASH): ICD-10-CM

## 2023-01-22 LAB
ALBUMIN SERPL-MCNC: 2.9 G/DL (ref 3.5–5.2)
ALBUMIN/GLOB SERPL: 1.2 G/DL
ALP SERPL-CCNC: 168 U/L (ref 39–117)
ALT SERPL W P-5'-P-CCNC: 9 U/L (ref 1–41)
AMMONIA BLD-SCNC: 28 UMOL/L (ref 16–60)
ANION GAP SERPL CALCULATED.3IONS-SCNC: 8 MMOL/L (ref 5–15)
AST SERPL-CCNC: 13 U/L (ref 1–40)
BASOPHILS # BLD AUTO: 0 10*3/MM3 (ref 0–0.2)
BASOPHILS # BLD AUTO: 0 10*3/MM3 (ref 0–0.2)
BASOPHILS NFR BLD AUTO: 0.8 % (ref 0–1.5)
BASOPHILS NFR BLD AUTO: 1 % (ref 0–1.5)
BH BB BLOOD EXPIRATION DATE: NORMAL
BH BB BLOOD TYPE BARCODE: 6200
BH BB DISPENSE STATUS: NORMAL
BH BB PRODUCT CODE: NORMAL
BH BB UNIT NUMBER: NORMAL
BILIRUB SERPL-MCNC: 3.4 MG/DL (ref 0–1.2)
BUN SERPL-MCNC: 42 MG/DL (ref 8–23)
BUN/CREAT SERPL: 18.3 (ref 7–25)
CALCIUM SPEC-SCNC: 7.7 MG/DL (ref 8.6–10.5)
CHLORIDE SERPL-SCNC: 104 MMOL/L (ref 98–107)
CO2 SERPL-SCNC: 21 MMOL/L (ref 22–29)
CREAT SERPL-MCNC: 2.29 MG/DL (ref 0.76–1.27)
CROSSMATCH INTERPRETATION: NORMAL
DEPRECATED RDW RBC AUTO: 60.4 FL (ref 37–54)
DEPRECATED RDW RBC AUTO: 64.8 FL (ref 37–54)
EGFRCR SERPLBLD CKD-EPI 2021: 30.5 ML/MIN/1.73
EOSINOPHIL # BLD AUTO: 0 10*3/MM3 (ref 0–0.4)
EOSINOPHIL # BLD AUTO: 0 10*3/MM3 (ref 0–0.4)
EOSINOPHIL NFR BLD AUTO: 1.9 % (ref 0.3–6.2)
EOSINOPHIL NFR BLD AUTO: 2.1 % (ref 0.3–6.2)
ERYTHROCYTE [DISTWIDTH] IN BLOOD BY AUTOMATED COUNT: 18.1 % (ref 12.3–15.4)
ERYTHROCYTE [DISTWIDTH] IN BLOOD BY AUTOMATED COUNT: 18.1 % (ref 12.3–15.4)
GLOBULIN UR ELPH-MCNC: 2.5 GM/DL
GLUCOSE BLDC GLUCOMTR-MCNC: 208 MG/DL (ref 70–105)
GLUCOSE BLDC GLUCOMTR-MCNC: 263 MG/DL (ref 70–105)
GLUCOSE BLDC GLUCOMTR-MCNC: 277 MG/DL (ref 70–105)
GLUCOSE BLDC GLUCOMTR-MCNC: 307 MG/DL (ref 70–105)
GLUCOSE SERPL-MCNC: 255 MG/DL (ref 65–99)
HCT VFR BLD AUTO: 22.8 % (ref 37.5–51)
HCT VFR BLD AUTO: 25 % (ref 37.5–51)
HCT VFR BLD AUTO: 26.5 % (ref 37.5–51)
HCT VFR BLD AUTO: 27.1 % (ref 37.5–51)
HGB BLD-MCNC: 7.5 G/DL (ref 13–17.7)
HGB BLD-MCNC: 8.2 G/DL (ref 13–17.7)
HGB BLD-MCNC: 8.6 G/DL (ref 13–17.7)
HGB BLD-MCNC: 8.8 G/DL (ref 13–17.7)
HOLD SPECIMEN: NORMAL
INR PPP: 1.11 (ref 0.93–1.1)
LYMPHOCYTES # BLD AUTO: 0.4 10*3/MM3 (ref 0.7–3.1)
LYMPHOCYTES # BLD AUTO: 0.4 10*3/MM3 (ref 0.7–3.1)
LYMPHOCYTES NFR BLD AUTO: 16.6 % (ref 19.6–45.3)
LYMPHOCYTES NFR BLD AUTO: 18.6 % (ref 19.6–45.3)
MCH RBC QN AUTO: 31.2 PG (ref 26.6–33)
MCH RBC QN AUTO: 31.7 PG (ref 26.6–33)
MCHC RBC AUTO-ENTMCNC: 32.3 G/DL (ref 31.5–35.7)
MCHC RBC AUTO-ENTMCNC: 32.9 G/DL (ref 31.5–35.7)
MCV RBC AUTO: 96.5 FL (ref 79–97)
MCV RBC AUTO: 96.6 FL (ref 79–97)
MONOCYTES # BLD AUTO: 0.2 10*3/MM3 (ref 0.1–0.9)
MONOCYTES # BLD AUTO: 0.2 10*3/MM3 (ref 0.1–0.9)
MONOCYTES NFR BLD AUTO: 8.1 % (ref 5–12)
MONOCYTES NFR BLD AUTO: 8.2 % (ref 5–12)
NEUTROPHILS NFR BLD AUTO: 1.5 10*3/MM3 (ref 1.7–7)
NEUTROPHILS NFR BLD AUTO: 1.7 10*3/MM3 (ref 1.7–7)
NEUTROPHILS NFR BLD AUTO: 70.3 % (ref 42.7–76)
NEUTROPHILS NFR BLD AUTO: 72.4 % (ref 42.7–76)
NRBC BLD AUTO-RTO: 0.1 /100 WBC (ref 0–0.2)
NRBC BLD AUTO-RTO: 0.2 /100 WBC (ref 0–0.2)
PLATELET # BLD AUTO: 81 10*3/MM3 (ref 140–450)
PLATELET # BLD AUTO: 89 10*3/MM3 (ref 140–450)
PMV BLD AUTO: 7.4 FL (ref 6–12)
PMV BLD AUTO: 7.5 FL (ref 6–12)
POTASSIUM SERPL-SCNC: 4 MMOL/L (ref 3.5–5.2)
PROT SERPL-MCNC: 5.4 G/DL (ref 6–8.5)
PROTHROMBIN TIME: 11.4 SECONDS (ref 9.6–11.7)
RBC # BLD AUTO: 2.36 10*6/MM3 (ref 4.14–5.8)
RBC # BLD AUTO: 2.74 10*6/MM3 (ref 4.14–5.8)
SODIUM SERPL-SCNC: 133 MMOL/L (ref 136–145)
UNIT  ABO: NORMAL
UNIT  RH: NORMAL
WBC NRBC COR # BLD: 2.2 10*3/MM3 (ref 3.4–10.8)
WBC NRBC COR # BLD: 2.4 10*3/MM3 (ref 3.4–10.8)

## 2023-01-22 PROCEDURE — 85014 HEMATOCRIT: CPT | Performed by: STUDENT IN AN ORGANIZED HEALTH CARE EDUCATION/TRAINING PROGRAM

## 2023-01-22 PROCEDURE — 85018 HEMOGLOBIN: CPT | Performed by: STUDENT IN AN ORGANIZED HEALTH CARE EDUCATION/TRAINING PROGRAM

## 2023-01-22 PROCEDURE — 25010000002 NA FERRIC GLUC CPLX PER 12.5 MG

## 2023-01-22 PROCEDURE — 82140 ASSAY OF AMMONIA: CPT

## 2023-01-22 PROCEDURE — 99232 SBSQ HOSP IP/OBS MODERATE 35: CPT

## 2023-01-22 PROCEDURE — 82962 GLUCOSE BLOOD TEST: CPT

## 2023-01-22 PROCEDURE — 25010000002 ONDANSETRON PER 1 MG: Performed by: STUDENT IN AN ORGANIZED HEALTH CARE EDUCATION/TRAINING PROGRAM

## 2023-01-22 PROCEDURE — G0378 HOSPITAL OBSERVATION PER HR: HCPCS

## 2023-01-22 PROCEDURE — 80053 COMPREHEN METABOLIC PANEL: CPT

## 2023-01-22 PROCEDURE — 85025 COMPLETE CBC W/AUTO DIFF WBC: CPT

## 2023-01-22 PROCEDURE — 85610 PROTHROMBIN TIME: CPT

## 2023-01-22 PROCEDURE — 36415 COLL VENOUS BLD VENIPUNCTURE: CPT

## 2023-01-22 PROCEDURE — 63710000001 INSULIN LISPRO (HUMAN) PER 5 UNITS: Performed by: HOSPITALIST

## 2023-01-22 PROCEDURE — 63710000001 INSULIN LISPRO (HUMAN) PER 5 UNITS: Performed by: STUDENT IN AN ORGANIZED HEALTH CARE EDUCATION/TRAINING PROGRAM

## 2023-01-22 PROCEDURE — 63710000001 INSULIN GLARGINE PER 5 UNITS: Performed by: STUDENT IN AN ORGANIZED HEALTH CARE EDUCATION/TRAINING PROGRAM

## 2023-01-22 RX ADMIN — INSULIN LISPRO 8 UNITS: 100 INJECTION, SOLUTION INTRAVENOUS; SUBCUTANEOUS at 09:04

## 2023-01-22 RX ADMIN — INSULIN LISPRO 5 UNITS: 100 INJECTION, SOLUTION INTRAVENOUS; SUBCUTANEOUS at 17:16

## 2023-01-22 RX ADMIN — SODIUM CHLORIDE 125 MG: 9 INJECTION, SOLUTION INTRAVENOUS at 08:57

## 2023-01-22 RX ADMIN — PANTOPRAZOLE SODIUM 40 MG: 40 INJECTION, POWDER, FOR SOLUTION INTRAVENOUS at 08:57

## 2023-01-22 RX ADMIN — PANTOPRAZOLE SODIUM 40 MG: 40 INJECTION, POWDER, FOR SOLUTION INTRAVENOUS at 21:27

## 2023-01-22 RX ADMIN — Medication 10 ML: at 09:33

## 2023-01-22 RX ADMIN — SERTRALINE 100 MG: 100 TABLET, FILM COATED ORAL at 17:17

## 2023-01-22 RX ADMIN — INSULIN LISPRO 8 UNITS: 100 INJECTION, SOLUTION INTRAVENOUS; SUBCUTANEOUS at 21:27

## 2023-01-22 RX ADMIN — INSULIN GLARGINE 15 UNITS: 100 INJECTION, SOLUTION SUBCUTANEOUS at 21:27

## 2023-01-22 RX ADMIN — MONTELUKAST 10 MG: 10 TABLET, FILM COATED ORAL at 21:27

## 2023-01-22 RX ADMIN — INSULIN LISPRO 10 UNITS: 100 INJECTION, SOLUTION INTRAVENOUS; SUBCUTANEOUS at 12:44

## 2023-01-22 RX ADMIN — INSULIN LISPRO 5 UNITS: 100 INJECTION, SOLUTION INTRAVENOUS; SUBCUTANEOUS at 12:42

## 2023-01-22 RX ADMIN — Medication 10 ML: at 21:28

## 2023-01-22 RX ADMIN — INSULIN LISPRO 5 UNITS: 100 INJECTION, SOLUTION INTRAVENOUS; SUBCUTANEOUS at 08:57

## 2023-01-22 RX ADMIN — INSULIN LISPRO 5 UNITS: 100 INJECTION, SOLUTION INTRAVENOUS; SUBCUTANEOUS at 17:15

## 2023-01-22 RX ADMIN — SODIUM CHLORIDE 125 MG: 9 INJECTION, SOLUTION INTRAVENOUS at 15:21

## 2023-01-22 RX ADMIN — RIFAXIMIN 550 MG: 550 TABLET ORAL at 08:57

## 2023-01-22 RX ADMIN — ROSUVASTATIN 10 MG: 10 TABLET, FILM COATED ORAL at 21:27

## 2023-01-22 RX ADMIN — ONDANSETRON 4 MG: 2 INJECTION INTRAMUSCULAR; INTRAVENOUS at 19:22

## 2023-01-22 RX ADMIN — RIFAXIMIN 550 MG: 550 TABLET ORAL at 21:27

## 2023-01-22 NOTE — PLAN OF CARE
Goal Outcome Evaluation:  Patient has had no signs of bleeding in stool, no complaints of pain, trending hemoglobin, slight trend downward noted

## 2023-01-22 NOTE — PROGRESS NOTES
[facility]     Progress Note    Patient Name: Lei Mercado  : 1955  MRN: [id number]  Primary Care Physician:  Froylan June  Date of admission: 2023  Service date and time: 23 13:09 EST  Subjective   Subjective     Chief Complaint: melena and weakness    HPI:  Patient had bloody BM this morning, hgb trending down      Objective   Objective     Vitals:   Temp:  [96.9 °F (36.1 °C)-98.5 °F (36.9 °C)] 97.8 °F (36.6 °C)  Heart Rate:  [64-80] 80  Resp:  [15-22] 21  BP: (107-137)/(52-75) 117/52  Physical Exam    Constitutional: Awake, alert   Eyes: PERRLA, sclerae anicteric, no conjunctival injection   HENT: NCAT, mucous membranes moist   Neck: Supple, no thyromegaly, no lymphadenopathy, trachea midline   Respiratory: Clear to auscultation bilaterally, nonlabored respirations    Cardiovascular: RRR, no murmurs, rubs, or gallops, palpable pedal pulses bilaterally   Gastrointestinal: Positive bowel sounds, soft, nontender, nondistended   Musculoskeletal: No bilateral ankle edema, no clubbing or cyanosis to extremities   Psychiatric: Appropriate affect, cooperative   Neurologic: Oriented x 3, strength symmetric in all extremities, Cranial Nerves grossly intact to confrontation, speech clear   Skin: No rashes     Result Review    Result Review:  I have personally reviewed the results from the time of this admission to 2023 13:09 EST and agree with these findings:  [x]  Laboratory list / accordion  [x]  Microbiology  [x]  Radiology  [x]  EKG/Telemetry   [x]  Cardiology/Vascular   []  Pathology  []  Old records  []  Other:        Assessment & Plan   Assessment / Plan       Active Hospital Problems:  Active Hospital Problems    Diagnosis    • **Anemia, unspecified type    • Rectal bleeding      Plan:      #Melena  #Acute blood loss anemia  #Hemorrhoids  #Iron deficiency anemia  - s/p 2 units, cont to trend, GI following had flex sigmoidoscopy yesterday, reviewed, had more bleeding this morning, hgb  trending down again currently at 8.2, cont to trend h&h q 6 hrs    - Colonoscopy on 1/6/23 revealed polyps with AVM and internal hemorrhoids, s/p argon cogaulation and polypectomy    - fobt + and patient with melena    - Protonix 40mg IV BID  - IV iron replacement    - maintain hgb > 7.0      PERALES cirrhosis    - MELD 28    - patient has transplant evaluation in [place] on Tuesday, hopefully can dc tmrw      DM2, uncontrolled    - hold home PO meds    -  A1c pending    - lantus 15u SC daily    - ISS    - IVF NS    - diabetic educator     Hyponatremia    - improving, cont to trend     MARY on CKD    - Cr 2.4 on admission, base is ~1.9    - cont to trend    - IVF NS @ 75/hr, watch for fluid overload 2/2 cirrhosis     Thrombocytopenia    -plt 98, suspect 2/2 PERALES cirrhosis    - monitor labs    - transfer if gross bleeding     GERD    PPI     HLD    -continue home statin     Anxiety    -continue home zoloft    DVT prophylaxis:  Mechanical DVT prophylaxis orders are present.    CODE STATUS:   Code Status (Patient has no pulse and is not breathing): CPR (Attempt to Resuscitate)  Medical Interventions (Patient has pulse or is breathing): Full Support    Disposition:  I expect patient to be discharged 1 days.    Albert Reyes MD

## 2023-01-22 NOTE — PROGRESS NOTES
LOS: 0 days   Patient Care Team:  Froylan June as PCP - General (Preventative Medicine)  Evangelista Jones MD as Consulting Physician (Hematology and Oncology)      Subjective     Interval History:     Subjective: Patient reports having a couple more episodes of diarrhea with bright red blood.  No abdominal pain, nausea, vomiting,      ROS:   No chest pain, shortness of breath, or cough.        Medication Review:     Current Facility-Administered Medications:   •  dextrose (D50W) (25 g/50 mL) IV injection 25 g, 25 g, Intravenous, Q15 Min PRN, Leighton Segovia,   •  dextrose (GLUTOSE) oral gel 15 g, 15 g, Oral, Q15 Min PRN, Leighton Segovia, DO  •  ferric gluconate (FERRLECIT)125 MG in sodium chloride 0.9 % 100 mL IVPB, 125 mg, Intravenous, Once, Kelly Rivas, APRN, Last Rate: 100 mL/hr at 01/22/23 1521, 125 mg at 01/22/23 1521  •  glucagon (human recombinant) (GLUCAGEN DIAGNOSTIC) 1 mg in sterile water (preservative free) 1 mL injection, 1 mg, Intramuscular, Q15 Min PRN, Leighton Segovia,   •  insulin glargine (LANTUS, SEMGLEE) injection 15 Units, 15 Units, Subcutaneous, Nightly, Leighton Segovia DO, 15 Units at 01/21/23 2021  •  insulin lispro (ADMELOG) injection 3-14 Units, 3-14 Units, Subcutaneous, 4x Daily With Meals & Nightly, Albert Reyes MD, 10 Units at 01/22/23 1244  •  insulin lispro (ADMELOG) injection 5 Units, 5 Units, Subcutaneous, TID With Meals, Leighton Segovia DO, 5 Units at 01/22/23 1242  •  lactulose (CHRONULAC) 10 GM/15ML solution 20 g, 20 g, Oral, BID, Sharyn Prado, APRN  •  montelukast (SINGULAIR) tablet 10 mg, 10 mg, Oral, Nightly, Leighton Segovia DO, 10 mg at 01/21/23 2021  •  nitroglycerin (NITROSTAT) SL tablet 0.4 mg, 0.4 mg, Sublingual, Q5 Min PRN, Leighton Segovia DO  •  ondansetron (ZOFRAN) tablet 4 mg, 4 mg, Oral, Q6H PRN **OR** ondansetron (ZOFRAN) injection 4 mg, 4 mg, Intravenous, Q6H PRN, Leighton Segovia,   •  pantoprazole (PROTONIX) injection 40 mg, 40 mg,  Intravenous, BID, Leighton Segovia, , 40 mg at 01/22/23 0857  •  riFAXIMin (XIFAXAN) tablet 550 mg, 550 mg, Oral, Q12H, Sharyn Prado APRN, 550 mg at 01/22/23 0857  •  rosuvastatin (CRESTOR) tablet 10 mg, 10 mg, Oral, Nightly, Leighton Segovia, , 10 mg at 01/21/23 2021  •  sertraline (ZOLOFT) tablet 100 mg, 100 mg, Oral, Q PM, Leighton Segovia DO, 100 mg at 01/21/23 1747  •  [COMPLETED] Insert Peripheral IV, , , Once **AND** sodium chloride 0.9 % flush 10 mL, 10 mL, Intravenous, PRN, Leighton Segovia DO  •  sodium chloride 0.9 % flush 10 mL, 10 mL, Intravenous, Q12H, Leighton Segovia, , 10 mL at 01/22/23 0933  •  sodium chloride 0.9 % flush 10 mL, 10 mL, Intravenous, PRN, Leighton Segovia DO  •  sodium chloride 0.9 % infusion 40 mL, 40 mL, Intravenous, PRN, Leighton Segovia,       Objective     Vital Signs  Vitals:    01/22/23 0100 01/22/23 0500 01/22/23 0814 01/22/23 1133   BP: 137/75 125/58 107/55 117/52   BP Location:   Right arm Left arm   Patient Position:   Lying Lying   Pulse: 72 64 73 80   Resp: 22 18 18 21   Temp: 98.2 °F (36.8 °C) 97.9 °F (36.6 °C) 97.4 °F (36.3 °C) 97.8 °F (36.6 °C)   TempSrc:   Oral Oral   SpO2:  99% 99% 100%   Weight:       Height:           Physical Exam:     General Appearance:    Awake and alert, in no acute distress   Head:    Normocephalic, without obvious abnormality   Eyes:          Conjunctivae normal, anicteric sclera   Throat:   No oral lesions, no thrush, oral mucosa moist   Neck:   No adenopathy, supple, no JVD   Lungs:     respirations regular, even and unlabored   Abdomen:     Soft, non-tender, no rebound or guarding, non-distended, no hepatosplenomegaly   Rectal:     Deferred   Extremities:   No edema, no cyanosis   Skin:   No bruising or rash, no jaundice        Results Review:    CBC    Results from last 7 days   Lab Units 01/22/23  0624 01/22/23  0104 01/21/23  1906 01/21/23  0606 01/21/23  0133 01/20/23  1654   WBC 10*3/mm3  --  2.40*  --   --   --  2.40*    HEMOGLOBIN g/dL 8.2* 8.6* 9.5* 7.3* 6.5* 6.6*   PLATELETS 10*3/mm3  --  89*  --   --   --  98*     CMP   Results from last 7 days   Lab Units 01/22/23  0104 01/21/23  1906 01/21/23  0606 01/20/23  1654   SODIUM mmol/L 133*  --  128* 123*   POTASSIUM mmol/L 4.0  --  4.3 4.9   CHLORIDE mmol/L 104  --  100 94*   CO2 mmol/L 21.0*  --  21.0* 19.0*   BUN mg/dL 42*  --  45* 47*   CREATININE mg/dL 2.29*  --  2.15* 2.44*   GLUCOSE mg/dL 255*  --  320* 686*   ALBUMIN g/dL 2.9*  --   --  3.8   BILIRUBIN mg/dL 3.4*  --   --  3.5*   ALK PHOS U/L 168*  --   --  182*   AST (SGOT) U/L 13  --   --  15   ALT (SGPT) U/L 9  --   --  10   MAGNESIUM mg/dL  --   --   --  1.9   PHOSPHORUS mg/dL  --   --   --  3.9   AMMONIA umol/L 28 34  --   --      Cr Clearance Estimated Creatinine Clearance: 43.4 mL/min (A) (by C-G formula based on SCr of 2.29 mg/dL (H)).  Coag   Results from last 7 days   Lab Units 01/22/23  0105 01/20/23  1654   INR  1.11* 1.11*     HbA1C   Lab Results   Component Value Date    HGBA1C 5.0 12/28/2022    HGBA1C 5.1 12/26/2022    HGBA1C 7.9 (H) 05/09/2022     Blood Glucose   Glucose   Date/Time Value Ref Range Status   01/22/2023 1139 307 (H) 70 - 105 mg/dL Final     Comment:     Serial Number: [id number]:  [id number]   01/22/2023 0818 263 (H) 70 - 105 mg/dL Final     Comment:     Serial Number: [id number]:  [id number]   01/21/2023 2041 233 (H) 70 - 105 mg/dL Final     Comment:     Serial Number: [id number]:  [id number]   01/21/2023 1707 263 (H) 70 - 105 mg/dL Final     Comment:     Serial Number: [id number]:  [id number]   01/21/2023 1228 297 (H) 70 - 105 mg/dL Final     Comment:     Serial Number: [id number]:  [id number]   01/21/2023 0658 293 (H) 70 - 105 mg/dL Final     Comment:     Serial Number: [id number]:  [id number]   01/21/2023 0030 316 (H) 70 - 105 mg/dL Final     Comment:     Serial Number: [id number]:  [id number]   01/20/2023 2248 437 (C) 70 - 105 mg/dL Final      Comment:     Serial Number: [id number]:  [id number]     Infection     UA      Radiology(recent) No radiology results for the last day       Assessment & Plan   ASSESSMENT:   67 y.o. 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   He underwent flex sig yesterday with Dr. Retana showing post banding ulcer status post clip placement.     1/2021 flex sig ([facility]): 2 clean-based ulcers noticed in ascending colon area from recent AVM cauterization and polypectomies, suspect rectal bleeding from post banding ulcer site s/p Endo Clip placement, severe portal hypertensive colopathy with nonbleeding small AVM    - Acute blood loss anemia   - Hematochezia s/p recent colonoscopy on 1/6/23 and subsequent internal hemorrhoid banding on 1/19/23   - PERALES cirrhosis complicated by recurrent ascites -patient has appointment with [facility] team on 1/24/2023  - Renal cancer s/p right partial nephrectomy (9/2021)  - Diabetes  - Hypertension        PLAN:  Flex sig revealed ulcers from recent AVM cauterization and polypectomies and hemorrhoid banding which is likely the source of GI blood loss.  Hemoglobin is now stable at 8.2.  He did have 4 episodes of rectal bleeding today.  Continue to monitor H&H and transfuse for hemoglobin less than 7.  If hemoglobin is stable tomorrow and no further blood loss, would be okay for discharge home from GI standpoint as long as medically stable otherwise.  He received IV iron.  Continue PPI.  Recommend he follow-up in our office after hospital discharge for management of rectal bleeding and cirrhosis.   Supportive care.  GI will follow.     I discussed the patients findings and my recommendations with the patient.    Electronically signed by DONALD Mares, 01/22/23, 3:43 PM EST.

## 2023-01-23 ENCOUNTER — READMISSION MANAGEMENT (OUTPATIENT)
Dept: CALL CENTER | Facility: HOSPITAL | Age: 68
End: 2023-01-23
Payer: MEDICARE

## 2023-01-23 ENCOUNTER — ON CAMPUS - OUTPATIENT (AMBULATORY)
Dept: URBAN - METROPOLITAN AREA HOSPITAL 85 | Facility: HOSPITAL | Age: 68
End: 2023-01-23

## 2023-01-23 VITALS
TEMPERATURE: 97.7 F | BODY MASS INDEX: 28.41 KG/M2 | SYSTOLIC BLOOD PRESSURE: 109 MMHG | DIASTOLIC BLOOD PRESSURE: 61 MMHG | OXYGEN SATURATION: 95 % | RESPIRATION RATE: 14 BRPM | HEIGHT: 74 IN | HEART RATE: 68 BPM | WEIGHT: 221.34 LBS

## 2023-01-23 DIAGNOSIS — D50.0 IRON DEFICIENCY ANEMIA SECONDARY TO BLOOD LOSS (CHRONIC): ICD-10-CM

## 2023-01-23 DIAGNOSIS — I10 ESSENTIAL (PRIMARY) HYPERTENSION: ICD-10-CM

## 2023-01-23 DIAGNOSIS — K92.1 MELENA: ICD-10-CM

## 2023-01-23 DIAGNOSIS — K75.81 NONALCOHOLIC STEATOHEPATITIS (NASH): ICD-10-CM

## 2023-01-23 PROBLEM — N18.32 STAGE 3B CHRONIC KIDNEY DISEASE: Status: ACTIVE | Noted: 2023-01-23

## 2023-01-23 PROBLEM — E11.65 TYPE 2 DIABETES MELLITUS WITH HYPERGLYCEMIA: Status: ACTIVE | Noted: 2023-01-23

## 2023-01-23 PROBLEM — D61.818 PANCYTOPENIA: Status: ACTIVE | Noted: 2023-01-20

## 2023-01-23 LAB
ALBUMIN SERPL-MCNC: 2.7 G/DL (ref 3.5–5.2)
ALBUMIN/GLOB SERPL: 1.2 G/DL
ALP SERPL-CCNC: 177 U/L (ref 39–117)
ALT SERPL W P-5'-P-CCNC: 8 U/L (ref 1–41)
ANION GAP SERPL CALCULATED.3IONS-SCNC: 9 MMOL/L (ref 5–15)
AST SERPL-CCNC: 12 U/L (ref 1–40)
BILIRUB SERPL-MCNC: 2.3 MG/DL (ref 0–1.2)
BUN SERPL-MCNC: 40 MG/DL (ref 8–23)
BUN/CREAT SERPL: 20.1 (ref 7–25)
CALCIUM SPEC-SCNC: 7.7 MG/DL (ref 8.6–10.5)
CHLORIDE SERPL-SCNC: 108 MMOL/L (ref 98–107)
CO2 SERPL-SCNC: 18 MMOL/L (ref 22–29)
CREAT SERPL-MCNC: 1.99 MG/DL (ref 0.76–1.27)
EGFRCR SERPLBLD CKD-EPI 2021: 36.1 ML/MIN/1.73
GLOBULIN UR ELPH-MCNC: 2.3 GM/DL
GLUCOSE BLDC GLUCOMTR-MCNC: 229 MG/DL (ref 70–105)
GLUCOSE BLDC GLUCOMTR-MCNC: 249 MG/DL (ref 70–105)
GLUCOSE SERPL-MCNC: 261 MG/DL (ref 65–99)
HCT VFR BLD AUTO: 25.3 % (ref 37.5–51)
HGB BLD-MCNC: 7.9 G/DL (ref 13–17.7)
POTASSIUM SERPL-SCNC: 4.4 MMOL/L (ref 3.5–5.2)
PROT SERPL-MCNC: 5 G/DL (ref 6–8.5)
SODIUM SERPL-SCNC: 135 MMOL/L (ref 136–145)

## 2023-01-23 PROCEDURE — 63710000001 INSULIN LISPRO (HUMAN) PER 5 UNITS: Performed by: STUDENT IN AN ORGANIZED HEALTH CARE EDUCATION/TRAINING PROGRAM

## 2023-01-23 PROCEDURE — 82962 GLUCOSE BLOOD TEST: CPT

## 2023-01-23 PROCEDURE — G0378 HOSPITAL OBSERVATION PER HR: HCPCS

## 2023-01-23 PROCEDURE — 99212 OFFICE O/P EST SF 10 MIN: CPT | Performed by: NURSE PRACTITIONER

## 2023-01-23 PROCEDURE — 63710000001 INSULIN LISPRO (HUMAN) PER 5 UNITS: Performed by: HOSPITALIST

## 2023-01-23 PROCEDURE — G0108 DIAB MANAGE TRN  PER INDIV: HCPCS

## 2023-01-23 PROCEDURE — 85018 HEMOGLOBIN: CPT | Performed by: STUDENT IN AN ORGANIZED HEALTH CARE EDUCATION/TRAINING PROGRAM

## 2023-01-23 PROCEDURE — 85014 HEMATOCRIT: CPT | Performed by: STUDENT IN AN ORGANIZED HEALTH CARE EDUCATION/TRAINING PROGRAM

## 2023-01-23 RX ORDER — PEN NEEDLE, DIABETIC 30 GX3/16"
1 NEEDLE, DISPOSABLE MISCELLANEOUS
Qty: 200 EACH | Refills: 2 | Status: SHIPPED | OUTPATIENT
Start: 2023-01-23

## 2023-01-23 RX ORDER — LACTULOSE 10 G/15ML
20 SOLUTION ORAL 2 TIMES DAILY
Qty: 946 ML | Refills: 0 | Status: SHIPPED | OUTPATIENT
Start: 2023-01-23

## 2023-01-23 RX ORDER — INSULIN LISPRO 100 [IU]/ML
5 INJECTION, SOLUTION INTRAVENOUS; SUBCUTANEOUS
Qty: 15 ML | Refills: 2 | Status: SHIPPED | OUTPATIENT
Start: 2023-01-23

## 2023-01-23 RX ORDER — FERROUS SULFATE 300 MG/5ML
300 LIQUID (ML) ORAL DAILY
Qty: 150 ML | Refills: 0 | Status: SHIPPED | OUTPATIENT
Start: 2023-01-23 | End: 2023-02-22

## 2023-01-23 RX ADMIN — INSULIN LISPRO 5 UNITS: 100 INJECTION, SOLUTION INTRAVENOUS; SUBCUTANEOUS at 11:50

## 2023-01-23 RX ADMIN — Medication 10 ML: at 08:19

## 2023-01-23 RX ADMIN — INSULIN LISPRO 5 UNITS: 100 INJECTION, SOLUTION INTRAVENOUS; SUBCUTANEOUS at 08:18

## 2023-01-23 RX ADMIN — RIFAXIMIN 550 MG: 550 TABLET ORAL at 08:18

## 2023-01-23 RX ADMIN — PANTOPRAZOLE SODIUM 40 MG: 40 INJECTION, POWDER, FOR SOLUTION INTRAVENOUS at 08:18

## 2023-01-23 NOTE — DISCHARGE SUMMARY
[facility]         DISCHARGE SUMMARY    Patient Name: Lei Mercado  : 1955  MRN: [id number]    Date of Admission: 2023  Date of Discharge:  23  Primary Care Physician: Froylan June    Consults     Date and Time Order Name Status Description    2023  8:54 PM Inpatient Gastroenterology Consult Completed     2023  8:10 PM Hospitalist (on-call MD unless specified)      2022  8:26 AM Inpatient Gastroenterology Consult Completed     2022  1:57 PM Urology (on-call MD unless specified) Completed           Presenting Problem:   Hyponatremia [E87.1]  Rectal bleeding [K62.5]  Acute renal insufficiency [N28.9]  Hyperglycemia [R73.9]  Anemia, unspecified type [D64.9]  Gastrointestinal hemorrhage, unspecified gastrointestinal hemorrhage type [K92.2]    Active and Resolved Hospital Problems:  Active Hospital Problems    Diagnosis POA   • **Pancytopenia (HCC) [D61.818] Yes   • Stage 3b chronic kidney disease (HCC) [N18.32] Yes   • Type 2 diabetes mellitus with hyperglycemia (HCC) [E11.65] Unknown   • Rectal bleeding [K62.5] Yes   • Anemia [D64.9] Yes   • 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) [K75.81, K74.60] Yes   • Other cirrhosis of liver (HCC) [K74.69] Yes   • PERALES (nonalcoholic steatohepatitis) [K75.81] Yes      Resolved Hospital Problems   No resolved problems to display.         Hospital Course     Hospital Course:  Lei Mercado is a 67 y.o. male with PMHx of PERALES cirrrhosis, hemrroids, anemia who presented to [facility] on 2023 complaining of anemia.  OF note, patient discharged from [facility] on 23 after undergoing coloscopy with plasma coagulation and polypectomy.  At follow up GI appointment today, he was found to be anemic and sent to [facility].  In the ED, labs notable for glucose 686, sodium 123, bicarb 19, Cl 94, Cr 2.44, BUN 47, hgb 6.6, plt 98.  Stool heme positive.  He is to be admitted for blood transfusion, glucose control, and GI eval.  Patient  received multiple units of PRBC's and had Flexible sigmoidoscopy with biopsy done on 1/21/23 that showed the following;  Impression:  1.  Suspect recent rectal bleeding from post banding ulcer site status post of 1 Endo Clip placement no further bleeding was noticed.  2.  2 clean-based ulcers were noticed in ascending colon area from recent AVM cauterization and polypectomy site, these were clean base without any sign of bleeding.  3.  Severe portal hypertensive colopathy with nonbleeding small AVM usually seen with the portal colopathy and are less likely source of active bleeding.    He was placed on lactulose, rifaximin and given IV iron as well as his labs showed Fe deficiency. He had some more rectal bleeding that then resolved and GI was ok for discharge as long as hgb was above 7 and his most recent one is 7.9. He is hemodynamically stable and has an appointment with liver transplant center in [facility]. He was started on lantus and SSI and received diabetes education for his DM-2.         DISCHARGE Follow Up Recommendations for labs and diagnostics: CBC in 1 week, GI in 1-2 weeks and PCP in 1 week      Day of Discharge     Vital Signs:  Temp:  [98 °F (36.7 °C)-98.3 °F (36.8 °C)] 98.2 °F (36.8 °C)  Heart Rate:  [68-87] 68  Resp:  [16-18] 16  BP: (112-128)/(52-63) 128/61  Physical Exam:  Constitutional: Awake, alert   Eyes: PERRLA, sclerae anicteric, no conjunctival injection   HENT: NCAT, mucous membranes moist   Neck: Supple, no thyromegaly, no lymphadenopathy, trachea midline   Respiratory: Clear to auscultation bilaterally, nonlabored respirations    Cardiovascular: RRR, no murmurs, rubs, or gallops, palpable pedal pulses bilaterally   Gastrointestinal: Positive bowel sounds, soft, nontender, nondistended   Musculoskeletal: No bilateral ankle edema, no clubbing or cyanosis to extremities   Psychiatric: Appropriate affect, cooperative   Neurologic: Oriented x 3, strength symmetric in all  extremities, Cranial Nerves grossly intact to confrontation, speech clear   Skin: No rashes     Pertinent  and/or Most Recent Results     LAB RESULTS:      Lab 01/23/23  1126 01/22/23 2308 01/22/23  1532 01/22/23  0624 01/22/23  0105 01/22/23  0104 01/21/23  0133 01/20/23  1654   WBC  --  2.20*  --   --   --  2.40*  --  2.40*   HEMOGLOBIN 7.9* 7.5* 8.8* 8.2*  --  8.6*   < > 6.6*   HEMATOCRIT 25.3* 22.8* 27.1* 25.0*  --  26.5*   < > 21.0*   PLATELETS  --  81*  --   --   --  89*  --  98*   NEUTROS ABS  --  1.50*  --   --   --  1.70  --  2.00   LYMPHS ABS  --  0.40*  --   --   --  0.40*  --  0.30*   MONOS ABS  --  0.20  --   --   --  0.20  --  0.10   EOS ABS  --  0.00  --   --   --  0.00  --  0.00   MCV  --  96.5  --   --   --  96.6  --  102.3*   PROTIME  --   --   --   --  11.4  --   --  11.4    < > = values in this interval not displayed.         Lab 01/22/23 2308 01/22/23 0104 01/21/23  0606 01/20/23  1654   SODIUM 135* 133* 128* 123*   POTASSIUM 4.4 4.0 4.3 4.9   CHLORIDE 108* 104 100 94*   CO2 18.0* 21.0* 21.0* 19.0*   ANION GAP 9.0 8.0 7.0 10.0   BUN 40* 42* 45* 47*   CREATININE 1.99* 2.29* 2.15* 2.44*   EGFR 36.1* 30.5* 32.9* 28.3*   GLUCOSE 261* 255* 320* 686*   CALCIUM 7.7* 7.7* 8.3* 8.9   MAGNESIUM  --   --   --  1.9   PHOSPHORUS  --   --   --  3.9         Lab 01/22/23 2308 01/22/23  0104 01/20/23  1654   TOTAL PROTEIN 5.0* 5.4* 6.7   ALBUMIN 2.7* 2.9* 3.8   GLOBULIN 2.3 2.5 2.9   ALT (SGPT) 8 9 10   AST (SGOT) 12 13 15   BILIRUBIN 2.3* 3.4* 3.5*   ALK PHOS 177* 168* 182*         Lab 01/22/23  0105 01/20/23  1654   PROTIME 11.4 11.4   INR 1.11* 1.11*             Lab 01/20/23  1654   IRON 127   IRON SATURATION 54*   TIBC 234*   TRANSFERRIN 157*   FERRITIN 1,087.00*   ABO TYPING A   RH TYPING Positive   ANTIBODY SCREEN Negative  Positive         Lab 01/20/23 1954   FIO2 21     Brief Urine Lab Results  (Last result in the past 365 days)      Color   Clarity   Blood   Leuk Est   Nitrite   Protein   CREAT    Urine HCG        12/26/22 1305 Yellow   Cloudy  Comment: Result checked     Large (3+)   Negative   Negative   >=300 mg/dL (3+)               Microbiology Results (last 10 days)     ** No results found for the last 240 hours. **          CT Abdomen Pelvis Without Contrast    Result Date: 12/26/2022  Impression:  1.  New right-sided perinephric fat stranding suggesting urinary tract infection such as pyelonephritis.  There is been interval improvement in right-sided hydronephrosis.  Previously demonstrated 2 mm right ureteral stone is no longer visualized. 2.  Changes of cirrhosis with nodular appearance of the liver and splenomegaly. 3.  Moderate to large amount of ascites throughout the abdomen and pelvis similar prior exam. 4.  No change in moderate left pleural effusion. 5.  Cholelithiasis.  Electronically Signed By-Yong Harrison MD On:12/26/2022 5:17 PM This report was finalized on [id number] by  Yong Harrison MD.    US Liver    Result Date: 12/27/2022  Impression:  1. Changes of cirrhosis and ascites.  Electronically Signed By-Larry Bowen MD On:12/27/2022 1:47 PM This report was finalized on [id number] by  Larry Bowen MD.              Results for orders placed during the hospital encounter of 08/11/22    Adult Transthoracic Echo Complete W/ Cont if Necessary Per Protocol    Interpretation Summary  · Left ventricular systolic function is normal.  · Left ventricular ejection fraction is 55 to 60%  · Left ventricular wall thickness is consistent with mild concentric hypertrophy.  · Left ventricular diastolic function is consistent with (grade I) impaired relaxation.      Labs Pending at Discharge: none        Discharge Details        Discharge Medications      New Medications      Instructions Start Date   ferrous sulfate 300 (60 Fe) MG/5ML syrup   300 mg, Oral, Daily      Insulin Glargine 100 UNIT/ML injection pen  Commonly known as: LANTUS SOLOSTAR   15 Units, Subcutaneous, Nightly, Dx code:  E11.65      Insulin Lispro (1 Unit Dial) 100 UNIT/ML solution pen-injector  Commonly known as: HumaLOG KwikPen   5 Units, Subcutaneous, 3 Times Daily Before Meals, Dx code: E11.65      lactulose 10 GM/15ML solution  Commonly known as: CHRONULAC   20 g, Oral, 2 Times Daily      Pen Needles 32G X 4 MM misc   1 each, Does not apply, 4 Times Daily Before Meals & Nightly, Dx code: E11.65      riFAXIMin 550 MG tablet  Commonly known as: XIFAXAN   550 mg, Oral, Every 12 Hours Scheduled         Continue These Medications      Instructions Start Date   metFORMIN  MG 24 hr tablet  Commonly known as: GLUCOPHAGE-XR   500 mg, Oral, Every Evening      montelukast 10 MG tablet  Commonly known as: SINGULAIR   10 mg, Oral, Every Night at Bedtime      pantoprazole 40 MG EC tablet  Commonly known as: PROTONIX   40 mg, Oral, 2 Times Daily Before Meals      pioglitazone 15 MG tablet  Commonly known as: ACTOS   15 mg, Oral, Every Evening      rosuvastatin 10 MG tablet  Commonly known as: CRESTOR   10 mg, Oral, Nightly      sertraline 100 MG tablet  Commonly known as: ZOLOFT   200 mg, Oral, Every Evening      terazosin 10 MG capsule  Commonly known as: HYTRIN   10 mg, Oral, Nightly             No Known Allergies      Discharge Disposition:  Home or Self Care    Diet:  Hospital:  Diet Order   Procedures   • Diet: Regular/House Diet; Texture: Regular Texture (IDDSI 7); Fluid Consistency: Thin (IDDSI 0)         Discharge Activity: as tolerated  Condition: stable        CODE STATUS:  Code Status and Medical Interventions:   Ordered at: 01/20/23 2054     Code Status (Patient has no pulse and is not breathing):    CPR (Attempt to Resuscitate)     Medical Interventions (Patient has pulse or is breathing):    Full Support         Future Appointments   Date Time Provider Department Center   2/3/2023 12:30 PM ROOM [address] [place] [address] [facility]  [facility] None           Time spent on Discharge including face to face service:  45 minutes    Albert Merino  MD Eric

## 2023-01-23 NOTE — PROGRESS NOTES
" LOS: 0 days   Patient Care Team:  Froylan June as PCP - General (Preventative Medicine)  Evangelista Jones MD as Consulting Physician (Hematology and Oncology)      Subjective \" No bleeding overnight\"    Interval History:     Subjective: *No bowel movement overnight.  Last episode of bleeding yesterday morning.  No nausea or vomiting.  No abdominal pain and tolerating diet    ROS:   No chest pain, shortness of breath, or cough.      Medication Review:     Current Facility-Administered Medications:   •  dextrose (D50W) (25 g/50 mL) IV injection 25 g, 25 g, Intravenous, Q15 Min PRN, Leighton Segovia, DO  •  dextrose (GLUTOSE) oral gel 15 g, 15 g, Oral, Q15 Min PRN, Leighton Segovia, DO  •  glucagon (human recombinant) (GLUCAGEN DIAGNOSTIC) 1 mg in sterile water (preservative free) 1 mL injection, 1 mg, Intramuscular, Q15 Min PRN, Leighton Segovia, DO  •  insulin glargine (LANTUS, SEMGLEE) injection 15 Units, 15 Units, Subcutaneous, Nightly, Leighton Segovia DO, 15 Units at 01/22/23 2127  •  insulin lispro (ADMELOG) injection 3-14 Units, 3-14 Units, Subcutaneous, 4x Daily With Meals & Nightly, Albert Reyes MD, 5 Units at 01/23/23 0818  •  insulin lispro (ADMELOG) injection 5 Units, 5 Units, Subcutaneous, TID With Meals, Leighton Segovia, , 5 Units at 01/23/23 0818  •  lactulose (CHRONULAC) 10 GM/15ML solution 20 g, 20 g, Oral, BID, Eve, Sharyn, APRN  •  montelukast (SINGULAIR) tablet 10 mg, 10 mg, Oral, Nightly, Leighton Segovia, , 10 mg at 01/22/23 2127  •  nitroglycerin (NITROSTAT) SL tablet 0.4 mg, 0.4 mg, Sublingual, Q5 Min PRN, Leighton Segovia, DO  •  ondansetron (ZOFRAN) tablet 4 mg, 4 mg, Oral, Q6H PRN **OR** ondansetron (ZOFRAN) injection 4 mg, 4 mg, Intravenous, Q6H PRN, Leighton Segovia DO, 4 mg at 01/22/23 1922  •  pantoprazole (PROTONIX) injection 40 mg, 40 mg, Intravenous, BID, Leighton Segovia DO, 40 mg at 01/23/23 0818  •  riFAXIMin (XIFAXAN) tablet 550 mg, 550 mg, Oral, Q12H, Sharyn Prado, " APRN, 550 mg at 01/23/23 0818  •  rosuvastatin (CRESTOR) tablet 10 mg, 10 mg, Oral, Nightly, Leighton Segovia DO, 10 mg at 01/22/23 2127  •  sertraline (ZOLOFT) tablet 100 mg, 100 mg, Oral, Q PM, Leighton Segovia DO, 100 mg at 01/22/23 1717  •  [COMPLETED] Insert Peripheral IV, , , Once **AND** sodium chloride 0.9 % flush 10 mL, 10 mL, Intravenous, PRN, Leighton Segovia,   •  sodium chloride 0.9 % flush 10 mL, 10 mL, Intravenous, Q12H, Leighton Segovia, , 10 mL at 01/23/23 0819  •  sodium chloride 0.9 % flush 10 mL, 10 mL, Intravenous, PRN, Leighton Segovia,   •  sodium chloride 0.9 % infusion 40 mL, 40 mL, Intravenous, PRN, Leighton Segovia,       Objective Resting in bed, no acute distress, no family present    Vital Signs  Vitals:    01/22/23 1641 01/22/23 2058 01/23/23 0036 01/23/23 0500   BP: 117/57 128/63 112/52 128/61   BP Location: Right arm Left arm Left arm Left arm   Patient Position: Lying Lying Lying Lying   Pulse: 72 83 87 68   Resp: 17 18 16 16   Temp: 98.1 °F (36.7 °C) 98 °F (36.7 °C) 98.3 °F (36.8 °C) 98.2 °F (36.8 °C)   TempSrc: Oral Oral Oral Oral   SpO2: 100% 99% 98% 97%   Weight:   100 kg (221 lb 5.5 oz)    Height:           Physical Exam:     General Appearance:    Awake and alert, in no acute distress   Head:    Normocephalic, without obvious abnormality   Eyes:          Conjunctivae normal, icteric sclera   Throat:   No oral lesions, no thrush, oral mucosa moist   Neck:    supple, no JVD   Lungs:     respirations regular, even and unlabored   Abdomen:     Soft, non-tender, nondistended   Rectal:     Deferred   Extremities:   No edema, no cyanosis   Skin:   No bruising or rash, mild jaundice        Results Review:    CBC    Results from last 7 days   Lab Units 01/22/23  2308 01/22/23  1532 01/22/23  0624 01/22/23  0104 01/21/23  1906 01/21/23  0606 01/21/23  0133 01/20/23  1654   WBC 10*3/mm3 2.20*  --   --  2.40*  --   --   --  2.40*   HEMOGLOBIN g/dL 7.5* 8.8* 8.2* 8.6* 9.5* 7.3* 6.5*  6.6*   PLATELETS 10*3/mm3 81*  --   --  89*  --   --   --  98*     CMP   Results from last 7 days   Lab Units 01/22/23  2308 01/22/23  0104 01/21/23  1906 01/21/23  0606 01/20/23  1654   SODIUM mmol/L 135* 133*  --  128* 123*   POTASSIUM mmol/L 4.4 4.0  --  4.3 4.9   CHLORIDE mmol/L 108* 104  --  100 94*   CO2 mmol/L 18.0* 21.0*  --  21.0* 19.0*   BUN mg/dL 40* 42*  --  45* 47*   CREATININE mg/dL 1.99* 2.29*  --  2.15* 2.44*   GLUCOSE mg/dL 261* 255*  --  320* 686*   ALBUMIN g/dL 2.7* 2.9*  --   --  3.8   BILIRUBIN mg/dL 2.3* 3.4*  --   --  3.5*   ALK PHOS U/L 177* 168*  --   --  182*   AST (SGOT) U/L 12 13  --   --  15   ALT (SGPT) U/L 8 9  --   --  10   MAGNESIUM mg/dL  --   --   --   --  1.9   PHOSPHORUS mg/dL  --   --   --   --  3.9   AMMONIA umol/L  --  28 34  --   --      Cr Clearance Estimated Creatinine Clearance: 45.5 mL/min (A) (by C-G formula based on SCr of 1.99 mg/dL (H)).  Coag   Results from last 7 days   Lab Units 01/22/23  0105 01/20/23  1654   INR  1.11* 1.11*     HbA1C   Lab Results   Component Value Date    HGBA1C 5.0 12/28/2022    HGBA1C 5.1 12/26/2022    HGBA1C 7.9 (H) 05/09/2022     Blood Glucose   Glucose   Date/Time Value Ref Range Status   01/23/2023 0726 229 (H) 70 - 105 mg/dL Final     Comment:     Serial Number: [id number]:  [id number]   01/22/2023 2055 277 (H) 70 - 105 mg/dL Final     Comment:     Serial Number: [id number]:  [id number]   01/22/2023 1639 208 (H) 70 - 105 mg/dL Final     Comment:     Serial Number: [id number]:  [id number]   01/22/2023 1139 307 (H) 70 - 105 mg/dL Final     Comment:     Serial Number: [id number]:  [id number]   01/22/2023 0818 263 (H) 70 - 105 mg/dL Final     Comment:     Serial Number: [id number]:  [id number]   01/21/2023 2041 233 (H) 70 - 105 mg/dL Final     Comment:     Serial Number: [id number]:  [id number]   01/21/2023 1707 263 (H) 70 - 105 mg/dL Final     Comment:     Serial Number: [id number]:  [id number]    01/21/2023 1228 297 (H) 70 - 105 mg/dL Final     Comment:     Serial Number: [id number]:  [id number]     Infection     UA      Radiology(recent) No radiology results for the last day       Assessment & Plan    67 y.o. 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   He underwent flex sig with Dr. Retana showing post banding ulcer s/p clip placement.     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    No further bleeding since yesterday morning.  Awaiting repeat hemoglobin to ensure stabilization.  He has received IV iron, continue PPI.  He has evaluation with [facility] tomorrow and would like discharge home today if able.  If hemoglobin stable, consider discharge if no other bleeding has occurred.  Continue supportive care.    Electronically signed by DONALD Gracia, 01/23/23, 11:09 AM EST.

## 2023-01-23 NOTE — PLAN OF CARE
Goal Outcome Evaluation:  Hgb trending down, remains over 7, patient hoping to discharge later today, no complaints voiced

## 2023-01-23 NOTE — PLAN OF CARE
Goal Outcome Evaluation:      Patient has no c/o pain. HGB came back at 7.9, awaiting discharge orders. Call light is within reach. Able to make needs known. Alert and oriented x4. Plan of care continues.

## 2023-01-23 NOTE — CASE MANAGEMENT/SOCIAL WORK
Continued Stay Note   [facility]     Patient Name: Lei Mercado  MRN: [id number]  Today's Date: 1/23/2023    Admit Date: 1/20/2023    Plan: Homr   Discharge Plan     Row Name 01/23/23 1321       Plan    Plan Homr    Patient/Family in Agreement with Plan yes    Plan Comments Plan is home at discharge.  Has appointment tomorrow in [place] tomorrow,                Expected Discharge Date and Time     Expected Discharge Date Expected Discharge Time    Jan 23, 2023             Sonja Richards RN   Phone communication or documentation only - no physical contact with patient or family.

## 2023-01-23 NOTE — CONSULTS
"Diabetes Education  Assessment/Teaching    Patient Name:  Lei Mercado  YOB: 1955  MRN: [id number]  Admit Date:  1/20/2023      Assessment Date:  1/23/2023  Flowsheet Row Most Recent Value   General Information     Referral From: MD order, Blood glucose  [Consult for blood glucose >200 and admission blood sugar 686.]   Height 188 cm (74\")   Height Method Stated   Weight 100 kg (221 lb 5.5 oz)   Weight Method Bed scale   Pregnancy Assessment    Diabetes History    What type of diabetes do you have? Type 2   Current DM knowledge fair   Have you had diabetes education/teaching in the past? yes   When and where was your diabetes education? Inpatient hospitalizations at [facility] with last one being 12/28/2022   Do you test your blood sugar at home? no   Have you had high blood sugar? (>140mg/dl) yes   How often do you have high blood sugar? unknown   When was your last high blood sugar? Admission blood sugar 686.   Education Preferences    What areas of diabetes would you like to learn about? avoiding high blood sugar, avoiding low blood sugar, diabetes complications, medications for diabetes, testing my blood sugar at home   Nutrition Information    Assessment Topics    Taking Medication - Assessment Needs education   Problem Solving - Assessment Needs education   Reducing Risk - Assessment Needs education   Monitoring - Assessment Needs education   DM Goals    Taking Medication - Goal Today   Problem Solving - Goal Today   Monitoring - Goal Today          Flowsheet Row Most Recent Value   DM Education Needs    Meter Has own   Meter Type Freestyle  [Patient stated he has the Freestyle Shay that he got a couple of weeks ago but hasn't started using it yet.]   Frequency of Testing AC meals  [Discussed with patient that he should be minimally checking his blood sugars before meals.]   Medication Oral  [At home patient stated he takes Actos 15 mg daily and is supposed to take Metformin but doesn't due to the " side effects.]   Problem Solving Hypoglycemia, Hyperglycemia, Signs, Symptoms, Treatment   Discharge Plan Home   Motivation Engaged, Strong   Teaching Method Discussion, Demonstration, Handouts, Teach back, Explanation   Patient Response Demonstrates adequately, Verbalized understanding            Other Comments:  Patient stated he wants to get on the transplant list. Explained to patient that he needs to get his blood sugar under control and the best way to do that is to start on insulin. Patient agreeable to going home on insulin. Handouts given with discussion on types of insulin, storage of insulin, injection sites, disposal of needles, rotation of sites, and how to use an insulin pen. Patient did return demonstration X2 of applying the pen needle to the insulin pen, priming the pen, administering the shot into the foam pad,and holding the pen for 10 seconds after administration. Handouts given with discussion on hypoglycemia and hyperglycemia signs, symptoms, and treatment. Patient has never given self a shot before. Had patient insert needle into right lower abdomen and patient stated that wasn't bad. Instructed patient to put the Freestyle Shay on when he gets home so he can check his blood sugar.  Prescriptions started in discharge orders for Lantus Solostar, Humalog kwik pen, and pen needles. Patient has no further questions or concerns related to diabetes at this time.           Electronically signed by:  Dianne Lutz RN  01/23/23 11:24 EST

## 2023-01-23 NOTE — CASE MANAGEMENT/SOCIAL WORK
Case Management Discharge Note      Final Note: home    Provided Post Acute Provider List?: N/A  Provided Post Acute Provider Quality & Resource List?: N/A    Selected Continued Care - Discharged on 1/23/2023 Admission date: 1/20/2023 - Discharge disposition: Home or Self Care         Transportation Services  Private: Car    Final Discharge Disposition Code: 01 - home or self-care

## 2023-01-24 ENCOUNTER — TELEPHONE (OUTPATIENT)
Dept: DIABETES SERVICES | Facility: HOSPITAL | Age: 68
End: 2023-01-24
Payer: MEDICARE

## 2023-01-24 NOTE — OUTREACH NOTE
Prep Survey    Flowsheet Row Responses   [facility]? [facility]   Is LACE score < 7 ? No   Eligibility Readm Mgmt   Discharge diagnosis Anemia   Does the patient have one of the following disease processes/diagnoses(primary or secondary)? Other   Does the patient have Home health ordered? No   Is there a DME ordered? No   Prep survey completed? Yes          HERMES NIETO - Registered Nurse

## 2023-01-25 ENCOUNTER — READMISSION MANAGEMENT (OUTPATIENT)
Dept: CALL CENTER | Facility: HOSPITAL | Age: 68
End: 2023-01-25
Payer: MEDICARE

## 2023-01-25 ENCOUNTER — TELEPHONE (OUTPATIENT)
Dept: DIABETES SERVICES | Facility: HOSPITAL | Age: 68
End: 2023-01-25
Payer: MEDICARE

## 2023-01-25 NOTE — OUTREACH NOTE
Medical Week 1 Survey    Flowsheet Row Responses   [facility] facility patient discharged from? [facility]   Does the patient have one of the following disease processes/diagnoses(primary or secondary)? Other   Week 1 attempt successful? No   Unsuccessful attempts Attempt 1          VADIM Landon Registered Nurse

## 2023-01-30 ENCOUNTER — READMISSION MANAGEMENT (OUTPATIENT)
Dept: CALL CENTER | Facility: HOSPITAL | Age: 68
End: 2023-01-30
Payer: MEDICARE

## 2023-01-30 LAB
ABO GROUP BLD: NORMAL
BLD GP AB SCN SERPL QL: POSITIVE
RH BLD: POSITIVE
T&S EXPIRATION DATE: NORMAL

## 2023-01-30 NOTE — OUTREACH NOTE
Medical Week 1 Survey    Flowsheet Row Responses   [facility] patient discharged from? [facility]   Does the patient have one of the following disease processes/diagnoses(primary or secondary)? Other   Week 1 attempt successful? Yes   Call start time 0915   Call end time 0934   Discharge diagnosis Anemia, rectal bleeding, [id number]   Person spoke with today (if not patient) and relationship Patient   Meds reviewed with patient/caregiver? Yes   Does the patient have all medications ordered at discharge? No   What is keeping the patient from filling the prescriptions? Lost script/didn't receive  [Patient reports that he only got the long acting insulin pen, did not receive the quick acting pen to use at meals. ]   Nursing Interventions Nurse provided patient education  [Advised to call [facility] and check on status of prescription for short acting insulin pen. ]   Is the patient taking all medications as directed (includes completed medication regime)? No   What is preventing the patient from taking all medications as directed? Other  [See above. Patient only using long acting insulin as prescribed. ]   Does the patient have a primary care provider?  Yes   Does the patient have an appointment with their PCP within 7 days of discharge? Greater than 7 days   Comments regarding PCP Follow up with Froylan June PCP. Reports appt 2/8   Nursing Interventions Verified appointment date/time/provider   Has the patient kept scheduled appointments due by today? N/A   Comments Advised for patient to contact [facility] to schedule his f/u with Dr Retana   Has home health visited the patient within 72 hours of discharge? N/A   Psychosocial issues? No   Comments patient reports blood sugars in 200's.    Did the patient receive a copy of their discharge instructions? Yes   Nursing interventions Reviewed instructions with patient   What is the patient's perception of their health status since discharge? Improving   Is the patient/caregiver able  to teach back signs and symptoms related to disease process for when to call PCP? Yes   Is the patient/caregiver able to teach back the hierarchy of who to call/visit for symptoms/problems? PCP, Specialist, Home health nurse, Urgent Care, ED, 911 Yes   If the patient is a current smoker, are they able to teach back resources for cessation? Not a smoker   Week 1 call completed? Yes          SHERRIE FERREIRA - Registered Nurse

## 2023-02-01 RX ORDER — ALBUMIN (HUMAN) 12.5 G/50ML
25 SOLUTION INTRAVENOUS DAILY PRN
Status: CANCELLED | OUTPATIENT
Start: 2023-02-03

## 2023-02-01 RX ORDER — LIDOCAINE HYDROCHLORIDE 10 MG/ML
10 INJECTION, SOLUTION INFILTRATION; PERINEURAL AS NEEDED
Status: CANCELLED | OUTPATIENT
Start: 2023-02-03

## 2023-02-01 RX ORDER — ALBUMIN (HUMAN) 12.5 G/50ML
12.5 SOLUTION INTRAVENOUS DAILY PRN
Status: CANCELLED | OUTPATIENT
Start: 2023-02-03

## 2023-02-03 ENCOUNTER — HOSPITAL ENCOUNTER (OUTPATIENT)
Dept: INFUSION THERAPY | Facility: HOSPITAL | Age: 68
Discharge: HOME | End: 2023-02-03
Admitting: INTERNAL MEDICINE
Payer: MEDICARE

## 2023-02-03 VITALS
OXYGEN SATURATION: 100 % | DIASTOLIC BLOOD PRESSURE: 51 MMHG | HEART RATE: 86 BPM | RESPIRATION RATE: 16 BRPM | SYSTOLIC BLOOD PRESSURE: 119 MMHG | TEMPERATURE: 97.9 F

## 2023-02-03 DIAGNOSIS — D64.9 ANEMIA, UNSPECIFIED TYPE: ICD-10-CM

## 2023-02-03 DIAGNOSIS — N18.32 STAGE 3B CHRONIC KIDNEY DISEASE: Primary | ICD-10-CM

## 2023-02-03 DIAGNOSIS — R18.8 OTHER ASCITES: ICD-10-CM

## 2023-02-03 LAB
ABO GROUP BLD: NORMAL
BASOPHILS # BLD AUTO: 0 10*3/MM3 (ref 0–0.2)
BASOPHILS NFR BLD AUTO: 1 % (ref 0–1.5)
BB HOLD TUBE: NORMAL
BLD GP AB SCN SERPL QL: NEGATIVE
DEPRECATED RDW RBC AUTO: 65.2 FL (ref 37–54)
EOSINOPHIL # BLD AUTO: 0 10*3/MM3 (ref 0–0.4)
EOSINOPHIL NFR BLD AUTO: 0.8 % (ref 0.3–6.2)
ERYTHROCYTE [DISTWIDTH] IN BLOOD BY AUTOMATED COUNT: 17.4 % (ref 12.3–15.4)
HCT VFR BLD AUTO: 19.3 % (ref 37.5–51)
HGB BLD-MCNC: 5.9 G/DL (ref 13–17.7)
LYMPHOCYTES # BLD AUTO: 0.3 10*3/MM3 (ref 0.7–3.1)
LYMPHOCYTES NFR BLD AUTO: 12.6 % (ref 19.6–45.3)
MCH RBC QN AUTO: 32.4 PG (ref 26.6–33)
MCHC RBC AUTO-ENTMCNC: 30.6 G/DL (ref 31.5–35.7)
MCV RBC AUTO: 106 FL (ref 79–97)
MONOCYTES # BLD AUTO: 0.2 10*3/MM3 (ref 0.1–0.9)
MONOCYTES NFR BLD AUTO: 9.3 % (ref 5–12)
NEUTROPHILS NFR BLD AUTO: 1.7 10*3/MM3 (ref 1.7–7)
NEUTROPHILS NFR BLD AUTO: 76.3 % (ref 42.7–76)
NRBC BLD AUTO-RTO: 0.1 /100 WBC (ref 0–0.2)
PLATELET # BLD AUTO: 106 10*3/MM3 (ref 140–450)
PMV BLD AUTO: 6.7 FL (ref 6–12)
RBC # BLD AUTO: 1.82 10*6/MM3 (ref 4.14–5.8)
RH BLD: POSITIVE
WBC NRBC COR # BLD: 2.2 10*3/MM3 (ref 3.4–10.8)

## 2023-02-03 PROCEDURE — 86850 RBC ANTIBODY SCREEN: CPT | Performed by: INTERNAL MEDICINE

## 2023-02-03 PROCEDURE — 36430 TRANSFUSION BLD/BLD COMPNT: CPT

## 2023-02-03 PROCEDURE — 86901 BLOOD TYPING SEROLOGIC RH(D): CPT | Performed by: INTERNAL MEDICINE

## 2023-02-03 PROCEDURE — 86900 BLOOD TYPING SEROLOGIC ABO: CPT | Performed by: INTERNAL MEDICINE

## 2023-02-03 PROCEDURE — 25010000002 ALBUMIN HUMAN 25% PER 50 ML: Performed by: INTERNAL MEDICINE

## 2023-02-03 PROCEDURE — 86922 COMPATIBILITY TEST ANTIGLOB: CPT

## 2023-02-03 PROCEDURE — 85025 COMPLETE CBC W/AUTO DIFF WBC: CPT | Performed by: INTERNAL MEDICINE

## 2023-02-03 PROCEDURE — 96365 THER/PROPH/DIAG IV INF INIT: CPT

## 2023-02-03 PROCEDURE — P9047 ALBUMIN (HUMAN), 25%, 50ML: HCPCS | Performed by: INTERNAL MEDICINE

## 2023-02-03 PROCEDURE — 86900 BLOOD TYPING SEROLOGIC ABO: CPT

## 2023-02-03 PROCEDURE — P9016 RBC LEUKOCYTES REDUCED: HCPCS

## 2023-02-03 PROCEDURE — 96366 THER/PROPH/DIAG IV INF ADDON: CPT

## 2023-02-03 RX ORDER — SODIUM CHLORIDE 9 MG/ML
250 INJECTION, SOLUTION INTRAVENOUS AS NEEDED
Status: CANCELLED | OUTPATIENT
Start: 2023-02-03

## 2023-02-03 RX ORDER — SODIUM CHLORIDE 9 MG/ML
250 INJECTION, SOLUTION INTRAVENOUS AS NEEDED
Status: DISCONTINUED | OUTPATIENT
Start: 2023-02-03 | End: 2023-02-05 | Stop reason: HOSPADM

## 2023-02-03 RX ORDER — ALBUMIN (HUMAN) 12.5 G/50ML
25 SOLUTION INTRAVENOUS DAILY PRN
Status: COMPLETED | OUTPATIENT
Start: 2023-02-03 | End: 2023-02-03

## 2023-02-03 RX ORDER — ALBUMIN (HUMAN) 12.5 G/50ML
12.5 SOLUTION INTRAVENOUS DAILY PRN
Status: DISCONTINUED | OUTPATIENT
Start: 2023-02-03 | End: 2023-02-05 | Stop reason: HOSPADM

## 2023-02-03 RX ORDER — LIDOCAINE HYDROCHLORIDE 10 MG/ML
10 INJECTION, SOLUTION INFILTRATION; PERINEURAL AS NEEDED
Status: DISCONTINUED | OUTPATIENT
Start: 2023-02-03 | End: 2023-02-05 | Stop reason: HOSPADM

## 2023-02-03 RX ADMIN — ALBUMIN (HUMAN) 25 G: 0.25 INJECTION, SOLUTION INTRAVENOUS at 14:16

## 2023-02-03 RX ADMIN — LIDOCAINE HYDROCHLORIDE 10 ML: 10 INJECTION, SOLUTION EPIDURAL; INFILTRATION; INTRACAUDAL; PERINEURAL at 14:46

## 2023-02-03 RX ADMIN — ALBUMIN (HUMAN) 25 G: 0.25 INJECTION, SOLUTION INTRAVENOUS at 13:38

## 2023-02-03 RX ADMIN — ALBUMIN (HUMAN) 25 G: 0.25 INJECTION, SOLUTION INTRAVENOUS at 15:30

## 2023-02-03 RX ADMIN — ALBUMIN (HUMAN) 25 G: 0.25 INJECTION, SOLUTION INTRAVENOUS at 14:49

## 2023-02-03 NOTE — PROGRESS NOTES
PARACENTESIS    Time Arrived in Department: 1200      Consent: yes  Allergies have been Reviewed: yes      ID Band Verified: yes    Method: Wheelchair    Lab Results: Checked    Physician: Yaa      Nurse: Samreen Holm RN    IR Care Plan: Person Educated - Patient, Current Knowledge - Understands, Learning Barrier - None, Readiness to Learn - Acceptance, Teaching Topic - Procedure and Evaluation of Teaching - Verbalized Understanding      Neurological: Within Normal Limits    Skin: Flesh, Warm and Dry    Respiratory Status: Respirations even and unlabored    Room In: 9      Procedure: Paracentesis    Time Out Completed: yes    Time Out: 1330    Prep Time: 1332  Prep Site 1: Right Lateral Abdomen      Prep: Chlorhexidine and Sterile drape    Procedure Position: Right Side    Guidance: Ultrasound    Fluid Quality: Clear and Yellow    Procedure Note: Pt prepped and draped in a sterile fashion.  1% lidocaine for local anesthesia.  Pt tolerated well.  Procedure began at 1335.      Post-Procedure Position: Supine and HOB Elevated    Dressing Site 1: 2x2 and Tegaderm    Post Procedure Status:  Alert and Oriented, returned to baseline, Dressing Dry/ Intact, Stable Condition and Dressing properly labeled    Specimen To Lab: yes    Total Fluid Removed: 13.1 liters    Post Procedure Note:  Pt had a Hgb of 5.9,  Phoned Dr Retana.  1 unit of blood ordered and send patient to ED for evaluation and stat scan for bleeding.  Phoned ED to give report.  The patient has been evaluated for a liver transplant at [facility].  He called the team to see if he could come there after transfusion for evaluation.  He is going to go to  emergency  tonight after finishing transfusion.  Pt has a sister who will transport him. [facility] notified and ED notified.

## 2023-02-03 NOTE — PROCEDURES
"Therapeutic Bedside Paracentesis Without  Radiology    Date/Time: 2/3/2023 2:09 PM  Performed by: Jenni Gutierrez APRN  Authorized by: Kevin Park MD   Consent: Written consent obtained.  Risks and benefits: risks, benefits and alternatives were discussed  Consent given by: patient  Patient understanding: patient states understanding of the procedure being performed  Patient consent: the patient's understanding of the procedure matches consent given  Procedure consent: procedure consent matches procedure scheduled  Relevant documents: relevant documents present and verified  Test results: test results available and properly labeled  Site marked: the operative site was marked  Imaging studies: imaging studies available  Required items: required blood products, implants, devices, and special equipment available  Patient identity confirmed: verbally with patient, arm band and hospital-assigned identification number  Time out: Immediately prior to procedure a \"time out\" was called to verify the correct patient, procedure, equipment, support staff and site/side marked as required.  Initial or subsequent exam: subsequent  Procedure purpose: therapeutic  Indications: abdominal discomfort secondary to ascites  Anesthesia: local infiltration    Anesthesia:  Local Anesthetic: lidocaine 1% without epinephrine  Anesthetic total: 10 mL    Sedation:  Patient sedated: no    Preparation: Patient was prepped and draped in the usual sterile fashion.  Needle gauge: 22  Ultrasound guidance: yes  Puncture site: right lower quadrant  Aspirate amount (ml): 13.1L.  Fluid characteristics: Yellow.  Dressing: 2x2 and tegaderm.  Patient tolerance: patient tolerated the procedure well with no immediate complications      Electronically signed by DONALD Guevara, 02/03/23, 5:35 PM EST.    "

## 2023-02-09 ENCOUNTER — READMISSION MANAGEMENT (OUTPATIENT)
Dept: CALL CENTER | Facility: HOSPITAL | Age: 68
End: 2023-02-09
Payer: MEDICARE

## 2023-02-09 NOTE — OUTREACH NOTE
Medical Week 2 Survey    Flowsheet Row Responses   [facility] patient discharged from? [facility]   Does the patient have one of the following disease processes/diagnoses(primary or secondary)? Other   Week 2 attempt successful? Yes   Call start time 1244   Discharge diagnosis Anemia, rectal bleeding, [id number]   Revoke Readmitted  [Pt admitted in [facility] in [facility]]   Call end time 1245   Person spoke with today (if not patient) and relationship Sergio-Neil Chow S - Registered Nurse

## (undated) DEVICE — SNAR POLYP HOTSNARE/BRAIDED OVL/MINI 7F 2.8X10MM 230CM 1P/U

## (undated) DEVICE — TRAP WIDEEYE POLYP

## (undated) DEVICE — FIAPC® PROBE W/ FILTER 2200 A OD 2.3MM/6.9FR; L 2.2M/7.2FT: Brand: ERBE

## (undated) DEVICE — BITEBLOCK ENDO W/STRAP 60F A/ LF DISP

## (undated) DEVICE — PK ENDO GI 50

## (undated) DEVICE — MASK,OXY,ADLT,NON-REB,SAFETY VENT,7,UC: Brand: MEDLINE

## (undated) DEVICE — LN SMPL CO2 SHTRM SD STREAM W/M LUER

## (undated) DEVICE — KT ORCA ORCAPOD DISP STRL

## (undated) DEVICE — BITEBLOCK OMNI BLOC

## (undated) DEVICE — SENSR O2 OXIMAX FNGR A/ 18IN NONSTR

## (undated) DEVICE — TUBING, SUCTION, 1/4" X 10', STRAIGHT: Brand: MEDLINE

## (undated) DEVICE — ADAPT CLN BIOGUARD AIR/H2O DISP

## (undated) DEVICE — SINGLE-USE BIOPSY FORCEPS: Brand: RADIAL JAW 4